# Patient Record
Sex: FEMALE | Race: BLACK OR AFRICAN AMERICAN | NOT HISPANIC OR LATINO | Employment: OTHER | URBAN - METROPOLITAN AREA
[De-identification: names, ages, dates, MRNs, and addresses within clinical notes are randomized per-mention and may not be internally consistent; named-entity substitution may affect disease eponyms.]

---

## 2017-05-26 ENCOUNTER — HOSPITAL ENCOUNTER (EMERGENCY)
Facility: HOSPITAL | Age: 52
Discharge: HOME | End: 2017-05-27
Attending: EMERGENCY MEDICINE
Payer: MEDICARE

## 2017-05-26 DIAGNOSIS — Z91.148 NON COMPLIANCE W MEDICATION REGIMEN: ICD-10-CM

## 2017-05-26 DIAGNOSIS — R10.9 ABDOMINAL PAIN, UNSPECIFIED LOCATION: ICD-10-CM

## 2017-05-26 DIAGNOSIS — E11.65 UNCONTROLLED TYPE 2 DIABETES MELLITUS WITH COMPLICATION, UNSPECIFIED LONG TERM INSULIN USE STATUS: ICD-10-CM

## 2017-05-26 DIAGNOSIS — E11.8 UNCONTROLLED TYPE 2 DIABETES MELLITUS WITH COMPLICATION, UNSPECIFIED LONG TERM INSULIN USE STATUS: ICD-10-CM

## 2017-05-26 DIAGNOSIS — K52.9 GASTROENTERITIS, ACUTE: Primary | ICD-10-CM

## 2017-05-26 DIAGNOSIS — R11.2 NON-INTRACTABLE VOMITING WITH NAUSEA, UNSPECIFIED VOMITING TYPE: ICD-10-CM

## 2017-05-26 DIAGNOSIS — R73.9 HYPERGLYCEMIA: ICD-10-CM

## 2017-05-26 DIAGNOSIS — E86.0 DEHYDRATION: ICD-10-CM

## 2017-05-26 DIAGNOSIS — R19.7 DIARRHEA, UNSPECIFIED TYPE: ICD-10-CM

## 2017-05-26 LAB
ALBUMIN SERPL BCP-MCNC: 3.4 G/DL
ALP SERPL-CCNC: 127 U/L
ALT SERPL W/O P-5'-P-CCNC: 43 U/L
ANION GAP SERPL CALC-SCNC: 11 MMOL/L
AST SERPL-CCNC: 35 U/L
B-OH-BUTYR BLD STRIP-SCNC: 0.1 MMOL/L
BACTERIA #/AREA URNS HPF: NORMAL /HPF
BASOPHILS # BLD AUTO: 0 K/UL
BASOPHILS NFR BLD: 0 %
BILIRUB SERPL-MCNC: 0.6 MG/DL
BILIRUB UR QL STRIP: NEGATIVE
BNP SERPL-MCNC: <10 PG/ML
BUN SERPL-MCNC: 11 MG/DL
CALCIUM SERPL-MCNC: 9.4 MG/DL
CHLORIDE SERPL-SCNC: 97 MMOL/L
CLARITY UR: CLEAR
CO2 SERPL-SCNC: 24 MMOL/L
COLOR UR: YELLOW
CREAT SERPL-MCNC: 0.9 MG/DL
DIFFERENTIAL METHOD: NORMAL
EOSINOPHIL # BLD AUTO: 0.3 K/UL
EOSINOPHIL NFR BLD: 3.3 %
ERYTHROCYTE [DISTWIDTH] IN BLOOD BY AUTOMATED COUNT: 12.3 %
EST. GFR  (AFRICAN AMERICAN): >60 ML/MIN/1.73 M^2
EST. GFR  (NON AFRICAN AMERICAN): >60 ML/MIN/1.73 M^2
GLUCOSE SERPL-MCNC: 445 MG/DL
GLUCOSE UR QL STRIP: ABNORMAL
HCT VFR BLD AUTO: 40.4 %
HGB BLD-MCNC: 14.1 G/DL
HGB UR QL STRIP: NEGATIVE
KETONES UR QL STRIP: NEGATIVE
LEUKOCYTE ESTERASE UR QL STRIP: NEGATIVE
LIPASE SERPL-CCNC: 60 U/L
LYMPHOCYTES # BLD AUTO: 3.4 K/UL
LYMPHOCYTES NFR BLD: 44.3 %
MCH RBC QN AUTO: 29.8 PG
MCHC RBC AUTO-ENTMCNC: 34.9 %
MCV RBC AUTO: 85 FL
MICROSCOPIC COMMENT: NORMAL
MONOCYTES # BLD AUTO: 0.5 K/UL
MONOCYTES NFR BLD: 6.4 %
NEUTROPHILS # BLD AUTO: 3.5 K/UL
NEUTROPHILS NFR BLD: 45.9 %
NITRITE UR QL STRIP: NEGATIVE
PH UR STRIP: 7 [PH] (ref 5–8)
PLATELET # BLD AUTO: 297 K/UL
PMV BLD AUTO: 10.6 FL
POCT GLUCOSE: 378 MG/DL (ref 70–110)
POCT GLUCOSE: 460 MG/DL (ref 70–110)
POTASSIUM SERPL-SCNC: 4.5 MMOL/L
PROT SERPL-MCNC: 6.7 G/DL
PROT UR QL STRIP: NEGATIVE
RBC # BLD AUTO: 4.73 M/UL
SODIUM SERPL-SCNC: 132 MMOL/L
SP GR UR STRIP: <=1.005 (ref 1–1.03)
SQUAMOUS #/AREA URNS HPF: 1 /HPF
TROPONIN I SERPL DL<=0.01 NG/ML-MCNC: <0.006 NG/ML
URN SPEC COLLECT METH UR: ABNORMAL
UROBILINOGEN UR STRIP-ACNC: ABNORMAL EU/DL
WBC # BLD AUTO: 7.66 K/UL
YEAST URNS QL MICRO: NORMAL

## 2017-05-26 PROCEDURE — 25000003 PHARM REV CODE 250: Performed by: NURSE PRACTITIONER

## 2017-05-26 PROCEDURE — 80053 COMPREHEN METABOLIC PANEL: CPT

## 2017-05-26 PROCEDURE — 81000 URINALYSIS NONAUTO W/SCOPE: CPT

## 2017-05-26 PROCEDURE — 96361 HYDRATE IV INFUSION ADD-ON: CPT

## 2017-05-26 PROCEDURE — 63600175 PHARM REV CODE 636 W HCPCS: Performed by: NURSE PRACTITIONER

## 2017-05-26 PROCEDURE — 96372 THER/PROPH/DIAG INJ SC/IM: CPT

## 2017-05-26 PROCEDURE — 99284 EMERGENCY DEPT VISIT MOD MDM: CPT | Mod: 25

## 2017-05-26 PROCEDURE — 96374 THER/PROPH/DIAG INJ IV PUSH: CPT

## 2017-05-26 PROCEDURE — 83690 ASSAY OF LIPASE: CPT

## 2017-05-26 PROCEDURE — 83880 ASSAY OF NATRIURETIC PEPTIDE: CPT

## 2017-05-26 PROCEDURE — 82010 KETONE BODYS QUAN: CPT

## 2017-05-26 PROCEDURE — 82962 GLUCOSE BLOOD TEST: CPT

## 2017-05-26 PROCEDURE — 84484 ASSAY OF TROPONIN QUANT: CPT

## 2017-05-26 PROCEDURE — 93005 ELECTROCARDIOGRAM TRACING: CPT

## 2017-05-26 PROCEDURE — 85025 COMPLETE CBC W/AUTO DIFF WBC: CPT

## 2017-05-26 RX ORDER — ONDANSETRON 2 MG/ML
4 INJECTION INTRAMUSCULAR; INTRAVENOUS
Status: COMPLETED | OUTPATIENT
Start: 2017-05-26 | End: 2017-05-26

## 2017-05-26 RX ORDER — DICYCLOMINE HYDROCHLORIDE 10 MG/ML
20 INJECTION INTRAMUSCULAR
Status: COMPLETED | OUTPATIENT
Start: 2017-05-26 | End: 2017-05-26

## 2017-05-26 RX ADMIN — DICYCLOMINE HYDROCHLORIDE 20 MG: 20 INJECTION, SOLUTION INTRAMUSCULAR at 10:05

## 2017-05-26 RX ADMIN — SODIUM CHLORIDE 1000 ML: 0.9 INJECTION, SOLUTION INTRAVENOUS at 09:05

## 2017-05-26 RX ADMIN — ONDANSETRON 4 MG: 2 INJECTION INTRAMUSCULAR; INTRAVENOUS at 10:05

## 2017-05-27 VITALS
HEART RATE: 89 BPM | BODY MASS INDEX: 27.32 KG/M2 | TEMPERATURE: 99 F | RESPIRATION RATE: 18 BRPM | HEIGHT: 66 IN | DIASTOLIC BLOOD PRESSURE: 61 MMHG | SYSTOLIC BLOOD PRESSURE: 108 MMHG | WEIGHT: 170 LBS | OXYGEN SATURATION: 95 %

## 2017-05-27 RX ORDER — LOPERAMIDE HYDROCHLORIDE 2 MG/1
2 CAPSULE ORAL 4 TIMES DAILY PRN
Qty: 20 CAPSULE | Refills: 0 | Status: SHIPPED | OUTPATIENT
Start: 2017-05-27 | End: 2017-06-06

## 2017-05-27 RX ORDER — ONDANSETRON 4 MG/1
4 TABLET, FILM COATED ORAL EVERY 6 HOURS
Qty: 12 TABLET | Refills: 0 | Status: SHIPPED | OUTPATIENT
Start: 2017-05-27 | End: 2017-08-16

## 2017-05-27 RX ORDER — DICYCLOMINE HYDROCHLORIDE 20 MG/1
20 TABLET ORAL 4 TIMES DAILY
Qty: 30 TABLET | Refills: 0 | Status: SHIPPED | OUTPATIENT
Start: 2017-05-27 | End: 2017-08-16

## 2017-05-27 NOTE — ED NOTES
Patient has verified the spelling of their name and  on armband  LOC: The patient is awake, alert, and aware of environment with an appropriate affect, the patient is oriented x 4 and speaking appropriately.   APPEARANCE: Patient resting comfortably and in no acute distress, patient is clean and well groomed, patient's clothing is properly fastened.   SKIN: The skin is warm and dry, color consistent with ethnicity, patient has normal skin turgor and moist mucus membranes, skin intact, no breakdown or bruising noted.   : Voids without difficulty  MUSCULOSKELETAL: Patient moving all extremities spontaneously, no obvious swelling or deformities noted.   RESPIRATORY: Airway is open and patent, respirations are spontaneous, patient has a normal effort and rate, no accessory muscle use noted, bilateral breath sounds clear, denies SOB   ABDOMEN: Soft  Complaints of abd pain, no distention noted, normoactive bowel sounds present in all four quadrants, complaints of nausea and vomiting.   CARDIAC: Normal rate and rhythm, no peripheral edema noted, less then 3 second capillary refill, denies chest pain

## 2017-05-27 NOTE — ED PROVIDER NOTES
Encounter Date: 5/26/2017       History     Chief Complaint   Patient presents with    Abdominal Pain     reports generlized abd pain with N/V/D for 24 hours    Nausea    Vomiting    Diarrhea     Review of patient's allergies indicates:   Allergen Reactions    Lyrica [pregabalin] Other (See Comments)     headache     51-year-old female presents to the ER for evaluation of generalized abdominal pain with nausea, vomiting, and diarrhea since yesterday.  Patient states she has had 3-4 episodes of non-bloody diarrhea today.  She has not vomited today, but she has felt nauseated all day.  She has had nothing to eat or drink today because she has felt so nauseous.  Patient states her abdominal pain pain feels like cramping, as worse prior to an episode of diarrhea.  She denies hematemesis.  No fever.  Denies urinary symptoms.  No trauma.  She denies chest pain and shortness of breath.  He has tried nothing for her symptoms.  Patient states she is supposed to be on metformin, but she has lost over 20 pounds and has not taken the medication in several months.  Patient states that she has not followed up with her doctor since her weight loss determine if she needs metformin or not.  She does not check her glucose at home.      The history is provided by the patient.   Abdominal Cramping   The primary symptoms of the illness include abdominal pain, fatigue, nausea, vomiting and diarrhea. The primary symptoms of the illness do not include fever, shortness of breath, hematemesis, hematochezia, dysuria, vaginal discharge or vaginal bleeding. The current episode started yesterday. The onset of the illness was gradual. The problem has not changed since onset.  The abdominal pain began yesterday. The pain came on gradually. The abdominal pain has been unchanged since its onset. The abdominal pain is generalized. The abdominal pain does not radiate. The severity of the abdominal pain is 7/10. The abdominal pain is relieved by  nothing. Exacerbated by: Unknown.   The fatigue began today. The fatigue has been unchanged since its onset.   Nausea began yesterday. The nausea is associated with eating. The nausea is exacerbated by food.   The vomiting began yesterday. Vomiting occurs 6 to 10 times per day. The emesis contains stomach contents.   The diarrhea began yesterday. The diarrhea is watery. The diarrhea occurs 5 - 10 times per day.   The patient states that she believes she is currently not pregnant. The patient has not had a change in bowel habit. Additional symptoms associated with the illness include anorexia. Symptoms associated with the illness do not include chills, diaphoresis, heartburn, constipation, urgency, hematuria, frequency or back pain. Significant associated medical issues do not include GERD or inflammatory bowel disease.     Past Medical History:   Diagnosis Date    Chronic back pain      Past Surgical History:   Procedure Laterality Date    BACK SURGERY      BRAIN SURGERY      carpal tunnel release      HYSTERECTOMY       History reviewed. No pertinent family history.  Social History   Substance Use Topics    Smoking status: Current Every Day Smoker     Packs/day: 0.50     Types: Cigarettes    Smokeless tobacco: Not on file    Alcohol use No     Review of Systems   Constitutional: Positive for activity change, appetite change and fatigue. Negative for chills, diaphoresis and fever.   HENT: Negative for congestion.    Respiratory: Negative for cough and shortness of breath.    Cardiovascular: Negative for chest pain.   Gastrointestinal: Positive for abdominal pain, anorexia, diarrhea, nausea and vomiting. Negative for blood in stool, constipation, heartburn, hematemesis and hematochezia.   Genitourinary: Negative for dysuria, frequency, hematuria, urgency, vaginal bleeding and vaginal discharge.   Musculoskeletal: Negative for back pain and myalgias.   Skin: Negative for rash.   Allergic/Immunologic: Negative  for immunocompromised state.   Neurological: Positive for weakness (generalized).   Hematological: Does not bruise/bleed easily.   Psychiatric/Behavioral: Negative for confusion.       Physical Exam     Initial Vitals [05/26/17 2039]   BP Pulse Resp Temp SpO2   129/79 89 20 98.7 °F (37.1 °C) 99 %     Physical Exam    Nursing note and vitals reviewed.  Constitutional: Vital signs are normal. She appears well-developed and well-nourished. She is active and cooperative.  Non-toxic appearance. She does not have a sickly appearance. She does not appear ill. No distress.   HENT:   Head: Normocephalic and atraumatic.   Mouth/Throat: Uvula is midline. Mucous membranes are not pale, dry and not cyanotic.   Eyes: Conjunctivae and EOM are normal.   Neck: Normal range of motion. Neck supple.   Cardiovascular: Normal rate, regular rhythm and normal heart sounds.   Pulmonary/Chest: Effort normal and breath sounds normal.   Abdominal: Soft. Normal appearance and bowel sounds are normal. She exhibits no distension. There is generalized tenderness (mild). There is no rigidity, no rebound, no guarding and no CVA tenderness.   Neurological: She is alert and oriented to person, place, and time. She has normal strength. GCS eye subscore is 4. GCS verbal subscore is 5. GCS motor subscore is 6.   Skin: Skin is warm, dry and intact. No bruising and no rash noted.   Psychiatric: She has a normal mood and affect. Her speech is normal and behavior is normal. Judgment and thought content normal. Cognition and memory are normal.         ED Course   Procedures  Labs Reviewed   COMPREHENSIVE METABOLIC PANEL - Abnormal; Notable for the following:        Result Value    Sodium 132 (*)     Glucose 445 (*)     Albumin 3.4 (*)     All other components within normal limits   URINALYSIS - Abnormal; Notable for the following:     Specific Gravity, UA <=1.005 (*)     Glucose, UA 3+ (*)     Urobilinogen, UA 4.0-6.0 (*)     All other components within  normal limits   POCT GLUCOSE - Abnormal; Notable for the following:     POCT Glucose 460 (*)     All other components within normal limits   POCT GLUCOSE - Abnormal; Notable for the following:     POCT Glucose 378 (*)     All other components within normal limits   CBC W/ AUTO DIFFERENTIAL   LIPASE   BETA - HYDROXYBUTYRATE, SERUM   B-TYPE NATRIURETIC PEPTIDE   TROPONIN I   B-TYPE NATRIURETIC PEPTIDE   TROPONIN I   URINALYSIS MICROSCOPIC     EKG Readings: (Independently Interpreted)   Initial Reading: No STEMI. Rhythm: Normal Sinus Rhythm. Heart Rate: 82. Ectopy: No Ectopy. Conduction: Normal. Clinical Impression: Normal Sinus Rhythm          Medical Decision Making:   Initial Assessment:   52yo female here for n/v/d and generalized abd pain since yesterday.  No PO intake today.  Pt appears well, nontoxic.  Vitals stable.  MM dry.  Abd soft, mild diffuse tenderness. No r/r/g, no distention, bowel sounds normoactive. No CVA tenderness.  HR RRR, lungs CTA and equal.    Differential Diagnosis:   Gastroenteritis, dehydration, electrolyte derangement, DKA  Clinical Tests:   Lab Tests: Reviewed and Ordered  The following lab test(s) were unremarkable: CBC and Troponin       <> Summary of Lab: Beta hydroxy normal.  Medical Tests: Ordered and Reviewed  ED Management:  Labs, IV fluids, EKG, IM bentyl, IV zofran      Bedside glucose 460.  Patient reports abdominal pain has completely resolved after IM Bentyl.  She is tolerating by mouth fluids without difficulty.  After 1 L normal saline bolus, repeat blood glucose at bedside was 378.  Advised patient to resume metformin.  I feel the patient's symptoms are due to gastroenteritis and hyperglycemia.     Patient was turned over to Dr. Almaguer at 11:00 PM.              Attending Attestation:     Physician Attestation Statement for NP/PA:   I discussed this assessment and plan of this patient with the NP/PA, but I did not personally examine the patient. The face to face encounter  was performed by the NP/PA.                  ED Course     Clinical Impression:   There were no encounter diagnoses.          CALLIE Pryor  05/28/17 3765       CALLIE Pryor  05/28/17 3148

## 2017-08-16 ENCOUNTER — HOSPITAL ENCOUNTER (EMERGENCY)
Facility: HOSPITAL | Age: 52
Discharge: HOME | End: 2017-08-16
Attending: EMERGENCY MEDICINE
Payer: MEDICARE

## 2017-08-16 VITALS
OXYGEN SATURATION: 100 % | WEIGHT: 168 LBS | HEART RATE: 70 BPM | SYSTOLIC BLOOD PRESSURE: 145 MMHG | BODY MASS INDEX: 27 KG/M2 | TEMPERATURE: 98 F | DIASTOLIC BLOOD PRESSURE: 70 MMHG | HEIGHT: 66 IN | RESPIRATION RATE: 20 BRPM

## 2017-08-16 DIAGNOSIS — E11.65 UNCONTROLLED TYPE 2 DIABETES MELLITUS WITH HYPERGLYCEMIA, WITHOUT LONG-TERM CURRENT USE OF INSULIN: Primary | ICD-10-CM

## 2017-08-16 LAB
ALBUMIN SERPL BCP-MCNC: 3.7 G/DL
ALP SERPL-CCNC: 145 U/L
ALT SERPL W/O P-5'-P-CCNC: 30 U/L
ANION GAP SERPL CALC-SCNC: 12 MMOL/L
AST SERPL-CCNC: 25 U/L
B-OH-BUTYR BLD STRIP-SCNC: 0.1 MMOL/L
BASOPHILS # BLD AUTO: 0.01 K/UL
BASOPHILS NFR BLD: 0.1 %
BILIRUB SERPL-MCNC: 0.6 MG/DL
BUN SERPL-MCNC: 12 MG/DL
CALCIUM SERPL-MCNC: 9.5 MG/DL
CHLORIDE SERPL-SCNC: 99 MMOL/L
CO2 SERPL-SCNC: 21 MMOL/L
CREAT SERPL-MCNC: 0.9 MG/DL
DIFFERENTIAL METHOD: ABNORMAL
EOSINOPHIL # BLD AUTO: 0.3 K/UL
EOSINOPHIL NFR BLD: 3.3 %
ERYTHROCYTE [DISTWIDTH] IN BLOOD BY AUTOMATED COUNT: 12.7 %
EST. GFR  (AFRICAN AMERICAN): >60 ML/MIN/1.73 M^2
EST. GFR  (NON AFRICAN AMERICAN): >60 ML/MIN/1.73 M^2
GLUCOSE SERPL-MCNC: 365 MG/DL
HCT VFR BLD AUTO: 42.5 %
HGB BLD-MCNC: 14.8 G/DL
LYMPHOCYTES # BLD AUTO: 4.1 K/UL
LYMPHOCYTES NFR BLD: 50.3 %
MCH RBC QN AUTO: 30 PG
MCHC RBC AUTO-ENTMCNC: 34.8 G/DL
MCV RBC AUTO: 86 FL
MONOCYTES # BLD AUTO: 0.5 K/UL
MONOCYTES NFR BLD: 5.5 %
NEUTROPHILS # BLD AUTO: 3.3 K/UL
NEUTROPHILS NFR BLD: 40.7 %
PLATELET # BLD AUTO: 292 K/UL
PMV BLD AUTO: 10.7 FL
POCT GLUCOSE: 327 MG/DL (ref 70–110)
POTASSIUM SERPL-SCNC: 4.6 MMOL/L
PROT SERPL-MCNC: 7.4 G/DL
RBC # BLD AUTO: 4.94 M/UL
SODIUM SERPL-SCNC: 132 MMOL/L
WBC # BLD AUTO: 8.13 K/UL

## 2017-08-16 PROCEDURE — 96361 HYDRATE IV INFUSION ADD-ON: CPT

## 2017-08-16 PROCEDURE — 82962 GLUCOSE BLOOD TEST: CPT

## 2017-08-16 PROCEDURE — 25000003 PHARM REV CODE 250: Performed by: EMERGENCY MEDICINE

## 2017-08-16 PROCEDURE — 63600175 PHARM REV CODE 636 W HCPCS: Performed by: EMERGENCY MEDICINE

## 2017-08-16 PROCEDURE — 80053 COMPREHEN METABOLIC PANEL: CPT

## 2017-08-16 PROCEDURE — 82010 KETONE BODYS QUAN: CPT

## 2017-08-16 PROCEDURE — 99283 EMERGENCY DEPT VISIT LOW MDM: CPT | Mod: 25

## 2017-08-16 PROCEDURE — 96374 THER/PROPH/DIAG INJ IV PUSH: CPT

## 2017-08-16 PROCEDURE — 85025 COMPLETE CBC W/AUTO DIFF WBC: CPT

## 2017-08-16 RX ORDER — METFORMIN HYDROCHLORIDE 750 MG/1
750 TABLET, EXTENDED RELEASE ORAL
COMMUNITY
End: 2018-04-06

## 2017-08-16 RX ORDER — LISINOPRIL 5 MG/1
5 TABLET ORAL DAILY
COMMUNITY

## 2017-08-16 RX ADMIN — SODIUM CHLORIDE 1000 ML: 0.9 INJECTION, SOLUTION INTRAVENOUS at 11:08

## 2017-08-16 RX ADMIN — INSULIN HUMAN 6 UNITS: 100 INJECTION, SOLUTION PARENTERAL at 12:08

## 2017-08-16 NOTE — ED NOTES
APPEARANCE: Alert, oriented and in no acute distress.  CARDIAC: Normal rate and rhythm, no murmur heard.   PERIPHERAL VASCULAR: peripheral pulses present. Normal cap refill. No edema. Warm to touch.    RESPIRATORY:Normal rate and effort, breath sounds clear bilaterally throughout chest. Respirations are equal and unlabored no obvious signs of distress.  GASTRO: soft, bowel sounds normal, no tenderness, no abdominal distention.  MUSC: Full ROM. No bony tenderness or soft tissue tenderness. No obvious deformity.  SKIN: Skin is warm and dry, normal skin turgor, mucous membranes moist.  NEURO: 5/5 strength major flexors/extensors bilaterally. Sensory intact to light touch bilaterally. [place] coma scale: eyes open spontaneously-4, oriented & converses-5, obeys commands-6. No neurological abnormalities.   MENTAL STATUS: awake, alert and aware of environment.  ENT: EARS: no obvious drainage. NOSE: no active bleeding.

## 2017-08-16 NOTE — ED NOTES
52 year old female presents to ed with chief complaint of hyperglycemia. Patient reports eating nutri grain bars and drinking coke taking sugar at home of 581 reports blurry vision polydipsia polyuria

## 2017-08-19 NOTE — ED PROVIDER NOTES
Encounter Date: 8/16/2017       History     Chief Complaint   Patient presents with    Hyperglycemia     CBG at home was 581, polydipsia, polyuria     HPI   Aisha Muñoz is a 52 y.o. female who has a past medical history of Chronic back pain; Diabetes mellitus; and Hypertension who presents to the Emergency Department with high blood sugar.  Symptoms began today with elevated sugar of 581 at home today.  Symptoms are associated with polyuria, polydipsia, fatigue, and are not associated with fever, chills.  Patient denies any recent illness such as URI, dysuria, diarrhea, vomiting.  She admits to drinking a lot of sodas and sugary drinks and not conforming to her usual diet.   Pt has a past surgical history that includes Back surgery; Brain surgery; Hysterectomy; and carpal tunnel release.      Review of patient's allergies indicates:   Allergen Reactions    Lyrica [pregabalin] Other (See Comments)     headache     Past Medical History:   Diagnosis Date    Chronic back pain     Diabetes mellitus     Hypertension      Past Surgical History:   Procedure Laterality Date    BACK SURGERY      BRAIN SURGERY      carpal tunnel release      HYSTERECTOMY       No family history on file.  Social History   Substance Use Topics    Smoking status: Former Smoker     Packs/day: 0.50     Types: Cigarettes     Quit date: 11/8/2013    Smokeless tobacco: Never Used    Alcohol use No     Review of Systems   Constitutional: Positive for fatigue. Negative for fever.   HENT: Negative for sore throat.    Respiratory: Negative for shortness of breath.    Cardiovascular: Negative for chest pain.   Gastrointestinal: Negative for nausea and vomiting.   Endocrine: Positive for polydipsia and polyuria.   Genitourinary: Negative for dysuria.   Musculoskeletal: Negative for back pain.   Skin: Negative for rash.   Neurological: Negative for weakness.   Hematological: Does not bruise/bleed easily.   Psychiatric/Behavioral: Negative  for sleep disturbance.       Physical Exam     Initial Vitals [08/16/17 1106]   BP Pulse Resp Temp SpO2   (!) 146/88 61 17 97.8 °F (36.6 °C) 96 %      MAP       107.33         Physical Exam    Nursing note and vitals reviewed.  Constitutional: She appears well-developed and well-nourished. She is not diaphoretic. No distress.   HENT:   Head: Normocephalic and atraumatic.   Mouth/Throat: Oropharynx is clear and moist.   Eyes: Conjunctivae are normal. Pupils are equal, round, and reactive to light.   Neck: Neck supple.   Cardiovascular: Normal rate, regular rhythm and intact distal pulses.   No murmur heard.  Pulmonary/Chest: Breath sounds normal. No respiratory distress. She has no wheezes. She has no rhonchi. She has no rales.   Abdominal: Soft. Bowel sounds are normal. She exhibits no distension. There is no tenderness. There is no guarding.   Musculoskeletal: Normal range of motion. She exhibits no edema or tenderness.   Neurological: She is alert and oriented to person, place, and time.   Skin: Skin is warm and dry. No rash noted.   Psychiatric: She has a normal mood and affect.         ED Course   Procedures  Labs Reviewed   CBC W/ AUTO DIFFERENTIAL - Abnormal; Notable for the following:        Result Value    Lymph% 50.3 (*)     All other components within normal limits   COMPREHENSIVE METABOLIC PANEL - Abnormal; Notable for the following:     Sodium 132 (*)     CO2 21 (*)     Glucose 365 (*)     Alkaline Phosphatase 145 (*)     All other components within normal limits   POCT GLUCOSE - Abnormal; Notable for the following:     POCT Glucose 327 (*)     All other components within normal limits   BETA - HYDROXYBUTYRATE, SERUM             Medical Decision Making:   Initial Assessment:   This is an emergent evaluation of a 52-year-old female presents with hyperglycemia.    Patient was evaluated in the ED for DKA, however chemistry reviewed by me did not show any evidence of Acidosis.    As well her beta  hydroxybutyrate was negative.    She was treated in the ED with IV fluids and subcutaneous insulin with subsequent improvement.    Patient remained stable during her ED evaluation.  She'll be discharged home with instructions on diabetic diet.  Diet was discussed extensively by me.  Clinical Tests:   Lab Tests: Ordered and Reviewed              Attending Attestation:             Attending ED Notes:     Clinical impression and plan discussed with patient.    Pt is to call for follow up with PCP in 7 days.  Pt to reeturn to the ED for any new or concerning symptoms.  Aftercare instructions and return precautions provided to patient.   Pt expressed understanding and agrees with plan.            ED Course   Value Comment By Time   WBC: 8.13 (Reviewed) Kemal Menendez MD 08/16 1228   Hemoglobin: 14.8 (Reviewed) Kemal Menendez MD 08/16 1228   Platelets: 292 (Reviewed) Kemal Menendez MD 08/16 1228     Clinical Impression:   The encounter diagnosis was Uncontrolled type 2 diabetes mellitus with hyperglycemia, without long-term current use of insulin.    Disposition:   Disposition: Discharged  Condition: Stable                        Kemal Menendez MD  08/19/17 0641

## 2017-10-23 ENCOUNTER — HOSPITAL ENCOUNTER (EMERGENCY)
Facility: HOSPITAL | Age: 52
Discharge: HOME | End: 2017-10-23
Attending: EMERGENCY MEDICINE
Payer: MEDICARE

## 2017-10-23 VITALS
OXYGEN SATURATION: 100 % | DIASTOLIC BLOOD PRESSURE: 82 MMHG | RESPIRATION RATE: 12 BRPM | HEART RATE: 80 BPM | SYSTOLIC BLOOD PRESSURE: 119 MMHG | TEMPERATURE: 100 F | WEIGHT: 170 LBS | HEIGHT: 66 IN | BODY MASS INDEX: 27.32 KG/M2

## 2017-10-23 DIAGNOSIS — Z87.19 HISTORY OF UMBILICAL HERNIA: ICD-10-CM

## 2017-10-23 DIAGNOSIS — R10.9 ABDOMINAL PAIN, UNSPECIFIED ABDOMINAL LOCATION: Primary | ICD-10-CM

## 2017-10-23 LAB
ALBUMIN SERPL BCP-MCNC: 3.3 G/DL
ALP SERPL-CCNC: 127 U/L
ALT SERPL W/O P-5'-P-CCNC: 21 U/L
ANION GAP SERPL CALC-SCNC: 8 MMOL/L
AST SERPL-CCNC: 21 U/L
BACTERIA #/AREA URNS HPF: ABNORMAL /HPF
BASOPHILS # BLD AUTO: 0.01 K/UL
BASOPHILS NFR BLD: 0.1 %
BILIRUB SERPL-MCNC: 0.7 MG/DL
BILIRUB UR QL STRIP: NEGATIVE
BUN SERPL-MCNC: 12 MG/DL
CALCIUM SERPL-MCNC: 9.4 MG/DL
CHLORIDE SERPL-SCNC: 99 MMOL/L
CLARITY UR: CLEAR
CO2 SERPL-SCNC: 25 MMOL/L
COLOR UR: YELLOW
CREAT SERPL-MCNC: 1.1 MG/DL
DIFFERENTIAL METHOD: NORMAL
EOSINOPHIL # BLD AUTO: 0.4 K/UL
EOSINOPHIL NFR BLD: 3.8 %
ERYTHROCYTE [DISTWIDTH] IN BLOOD BY AUTOMATED COUNT: 12.7 %
EST. GFR  (AFRICAN AMERICAN): >60 ML/MIN/1.73 M^2
EST. GFR  (NON AFRICAN AMERICAN): 58 ML/MIN/1.73 M^2
GLUCOSE SERPL-MCNC: 420 MG/DL
GLUCOSE UR QL STRIP: ABNORMAL
HCT VFR BLD AUTO: 45.5 %
HGB BLD-MCNC: 15.2 G/DL
HGB UR QL STRIP: ABNORMAL
KETONES UR QL STRIP: NEGATIVE
LACTATE SERPL-SCNC: 2.1 MMOL/L
LEUKOCYTE ESTERASE UR QL STRIP: NEGATIVE
LIPASE SERPL-CCNC: 40 U/L
LYMPHOCYTES # BLD AUTO: 3.9 K/UL
LYMPHOCYTES NFR BLD: 42 %
MCH RBC QN AUTO: 30 PG
MCHC RBC AUTO-ENTMCNC: 33.4 G/DL
MCV RBC AUTO: 90 FL
MICROSCOPIC COMMENT: ABNORMAL
MONOCYTES # BLD AUTO: 0.5 K/UL
MONOCYTES NFR BLD: 5.9 %
NEUTROPHILS # BLD AUTO: 4.4 K/UL
NEUTROPHILS NFR BLD: 48 %
NITRITE UR QL STRIP: NEGATIVE
PH UR STRIP: 6 [PH] (ref 5–8)
PLATELET # BLD AUTO: 302 K/UL
PMV BLD AUTO: 10.6 FL
POTASSIUM SERPL-SCNC: 4.6 MMOL/L
PROT SERPL-MCNC: 7.6 G/DL
PROT UR QL STRIP: NEGATIVE
RBC # BLD AUTO: 5.07 M/UL
RBC #/AREA URNS HPF: 12 /HPF (ref 0–4)
SODIUM SERPL-SCNC: 132 MMOL/L
SP GR UR STRIP: <=1.005 (ref 1–1.03)
SQUAMOUS #/AREA URNS HPF: 3 /HPF
URN SPEC COLLECT METH UR: ABNORMAL
UROBILINOGEN UR STRIP-ACNC: 1 EU/DL
WBC # BLD AUTO: 9.19 K/UL
WBC #/AREA URNS HPF: 3 /HPF (ref 0–5)
YEAST URNS QL MICRO: ABNORMAL

## 2017-10-23 PROCEDURE — 96361 HYDRATE IV INFUSION ADD-ON: CPT

## 2017-10-23 PROCEDURE — 96374 THER/PROPH/DIAG INJ IV PUSH: CPT

## 2017-10-23 PROCEDURE — 83690 ASSAY OF LIPASE: CPT

## 2017-10-23 PROCEDURE — 85025 COMPLETE CBC W/AUTO DIFF WBC: CPT

## 2017-10-23 PROCEDURE — 81000 URINALYSIS NONAUTO W/SCOPE: CPT

## 2017-10-23 PROCEDURE — 83605 ASSAY OF LACTIC ACID: CPT

## 2017-10-23 PROCEDURE — 25000003 PHARM REV CODE 250: Performed by: EMERGENCY MEDICINE

## 2017-10-23 PROCEDURE — 99284 EMERGENCY DEPT VISIT MOD MDM: CPT | Mod: 25

## 2017-10-23 PROCEDURE — 80053 COMPREHEN METABOLIC PANEL: CPT

## 2017-10-23 PROCEDURE — 63600175 PHARM REV CODE 636 W HCPCS: Performed by: EMERGENCY MEDICINE

## 2017-10-23 RX ORDER — KETOROLAC TROMETHAMINE 30 MG/ML
15 INJECTION, SOLUTION INTRAMUSCULAR; INTRAVENOUS
Status: COMPLETED | OUTPATIENT
Start: 2017-10-23 | End: 2017-10-23

## 2017-10-23 RX ADMIN — SODIUM CHLORIDE 500 ML: 0.9 INJECTION, SOLUTION INTRAVENOUS at 09:10

## 2017-10-23 RX ADMIN — KETOROLAC TROMETHAMINE 15 MG: 30 INJECTION, SOLUTION INTRAMUSCULAR at 09:10

## 2017-10-24 NOTE — ED PROVIDER NOTES
"Encounter Date: 10/23/2017    SCRIBE #1 NOTE: I, Pat Rosemary, am scribing for, and in the presence of, Dr. Reyes.       History     Chief Complaint   Patient presents with    Abdominal Pain     generalized abd pain since Wednesday. states has hx of hernia. described as "just pain". denies n/v/d. eating makes the pain worse. denies anything resolving the pain. denies taking any medication for the pain      Time seen by provider: 8:20 PM    This is a 52 y.o. female with a PMHx of chronic back pain, HTN, and DM who presents with complaint of 3 days of generalized abdominal pain. The patient reports a known umbilical hernia. She states she has recently started cooking more for her family members and has reached up to a high shelf several times, which she feels exacerbates her pain. The pain in improved with laying on her back and worse with laying on her side. She states is hurts worse after PO intake and feels improved after urinating. She denies any nausea, vomiting, diarrhea and states her pain is 9/10. Her last BM was today and normal without blood. She denies recent illness or upper respiratory symptoms, chest pain, SOB, dysuria, or abnormal bowel movements.       The history is provided by the patient.     Review of patient's allergies indicates:   Allergen Reactions    Lyrica [pregabalin] Other (See Comments)     headache     Past Medical History:   Diagnosis Date    Brain tumor (benign) 1997    Chronic back pain     Diabetes mellitus     Hypertension      Past Surgical History:   Procedure Laterality Date    BACK SURGERY      BRAIN SURGERY      carpal tunnel release      HYSTERECTOMY       History reviewed. No pertinent family history.  Social History   Substance Use Topics    Smoking status: Former Smoker     Packs/day: 0.50     Types: Cigarettes     Quit date: 11/8/2013    Smokeless tobacco: Never Used    Alcohol use No     Review of Systems   Constitutional: Negative for chills and fever. "   HENT: Negative for congestion, postnasal drip, rhinorrhea, sinus pressure and sneezing.    Eyes: Negative for visual disturbance.   Respiratory: Negative for cough and shortness of breath.    Cardiovascular: Negative for chest pain.   Gastrointestinal: Positive for abdominal pain. Negative for blood in stool, constipation, diarrhea, nausea and vomiting.   Genitourinary: Negative for dysuria.   Musculoskeletal: Negative for gait problem.   Skin: Negative for rash.   Neurological: Negative for speech difficulty.       Physical Exam     Initial Vitals [10/23/17 1912]   BP Pulse Resp Temp SpO2   (!) 140/76 90 20 99 °F (37.2 °C) 99 %      MAP       97.33         Physical Exam    Nursing note and vitals reviewed.  Constitutional: She appears well-developed and well-nourished. She is not diaphoretic. No distress.   HENT:   Head: Normocephalic and atraumatic.   Mouth/Throat: Oropharynx is clear and moist.   Eyes: EOM are normal. Pupils are equal, round, and reactive to light.   Neck: No tracheal deviation present.   Cardiovascular: Normal rate, regular rhythm, normal heart sounds and intact distal pulses.   Pulmonary/Chest: Breath sounds normal. No stridor. No respiratory distress. She has no wheezes.   Abdominal: Soft. She exhibits no distension and no mass. There is generalized tenderness (mild). There is guarding (voluntary ). There is no rebound.       No peritoneal signs. Umbilical defect consistent with hernia, no protrusion.    Musculoskeletal: Normal range of motion. She exhibits no edema.   Neurological: She is alert and oriented to person, place, and time. No cranial nerve deficit or sensory deficit.   Skin: Skin is warm and dry. Capillary refill takes less than 2 seconds. No rash noted.   Psychiatric: She has a normal mood and affect. Her behavior is normal. Thought content normal.         ED Course   Procedures  Labs Reviewed   COMPREHENSIVE METABOLIC PANEL - Abnormal; Notable for the following:        Result  Value    Sodium 132 (*)     Glucose 420 (*)     Albumin 3.3 (*)     eGFR if non  58 (*)     All other components within normal limits   URINALYSIS - Abnormal; Notable for the following:     Specific Gravity, UA <=1.005 (*)     Glucose, UA 3+ (*)     Occult Blood UA Trace (*)     All other components within normal limits   URINALYSIS MICROSCOPIC - Abnormal; Notable for the following:     RBC, UA 12 (*)     All other components within normal limits   CBC W/ AUTO DIFFERENTIAL   LIPASE   LACTIC ACID, PLASMA        Imaging Results          X-Ray Abdomen Flat And Erect (Final result)  Result time 10/23/17 21:19:19    Final result by Divya Campuzano MD (10/23/17 21:19:19)                 Impression:      1.  Nonobstructive bowel gas pattern.      Electronically signed by: DIVYA CAMPUZANO MD  Date:     10/23/17  Time:    21:19              Narrative:    Abdomen flat    Clinical history: Abdominal pain    Comparison: None    Findings:  1 upright view, 2 supine views.    No significant air-fluid levels on upright view.  Air and stool is seen within the large bowel, and projected over the rectum.  There is moderate stool throughout the colon, may reflect constipation.  No focal dilated small bowel loops.  There are no calcifications to convincingly suggest nephrolithiasis or cholelithiasis.  No large volume free air or pneumatosis.  The osseous structures are grossly intact noting postsurgical changes of the lumbosacral region.  The lung apices are grossly clear.                                 Medical Decision Making:   History:   Old Medical Records: I decided to obtain old medical records.  Initial Assessment:   52 y.o. female presents with abdominal pain x3 days. Vitals WNL. Exam benign, small umbilical defect without current hernia or significant focal tenderness. Exam not consistent with obstruction or incarcerated/strangulated hernia. Plan for abdominal xrays, labs including LFTs and lactate. Will  provide IVF, Toradol, and reassess.   Differential Diagnosis:   Differential Diagnosis includes, but is not limited to:  AAA, aortic dissection, mesenteric ischemia, perforated viscous, MI/ACS, SBO/volvulus, incarcerated/strangulated hernia, intussusception, ileus, appendicitis, cholecystitis, cholangitis, diverticulitis, esophagitis, hepatitis, nephrolithiasis, pancreatitis, gastroenteritis, colitis, IBD/IBS, biliary colic, GERD, PUD, constipation, UTI/pyelonephritis,  disorder.    Clinical Tests:   Lab Tests: Ordered and Reviewed  Radiological Study: Ordered and Reviewed  ED Management:  Labs unremarkable.  On reassessment, patient's pain completely gone after IVF and Toradol.  Discussed further workup including CT vs observation of symptoms over the next few days, and patient comfortable with D/C at this time.  Counseled on worrisome symptoms, including worsening pain, N/V, fever, unable to tolerate PO or any other concerns.    After complete evaluation, including thorough history and physical exam, the patient's symptoms are most consistent with nonspecific abdominal pain. There is no peritonitis to suggest perforation or other emergent surgical process. There are no concerning features of fever or leukocytosis to suggest acute infection. There is no significant focal tenderness to suggest cholecystitis, appendicitis, diverticulitis, or  source, and current status does not warrant other targeted diagnostics at this time. CT A/P is unlikely to outweigh risks of radiation. The patient was treated with supportive care IVF and Toradol and improved. Recommend NSAIDs for pain.     Upon re-evaluation, the patient's status has improved.    After complete ED evaluation, clinical impression is most consistent with abdominal pain.    At this time, I believe the patient is stable for discharge from the ED. The patient and any additional family present were updated with test results, overall impression, and further plan  of care. All questions answered. Patient expressed understanding and agreed with current plan for discharge and PCP follow-up within 1 week. Strict return precautions were provided, including worsening of current symptoms or any other concerns.                      ED Course      Clinical Impression:   The primary encounter diagnosis was Abdominal pain, unspecified abdominal location. A diagnosis of History of umbilical hernia was also pertinent to this visit.            I, Dr. Amauri Reyes, personally performed the services described in this documentation. All medical record entries made by the scribe were at my direction and in my presence.  I have reviewed the chart and agree that the record reflects my personal performance and is accurate and complete.     Amauri Reyes MD.  10:20 PM 10/23/2017                   Amauri Reyes MD  11/03/17 1114

## 2017-10-24 NOTE — ED TRIAGE NOTES
Pt reports generalized abdominal pain since Wednesday, denies nausea, vomiting, or diarrhea, denies fever.  Last BM today.  Reports pain is worse after eating or drinking.

## 2018-04-16 PROBLEM — G56.00 CARPAL TUNNEL SYNDROME: Status: ACTIVE | Noted: 2018-04-16

## 2019-05-25 ENCOUNTER — HOSPITAL ENCOUNTER (EMERGENCY)
Facility: HOSPITAL | Age: 54
Discharge: HOME | End: 2019-05-25
Attending: EMERGENCY MEDICINE
Payer: COMMERCIAL

## 2019-05-25 VITALS
DIASTOLIC BLOOD PRESSURE: 75 MMHG | BODY MASS INDEX: 32.28 KG/M2 | WEIGHT: 200 LBS | SYSTOLIC BLOOD PRESSURE: 163 MMHG | OXYGEN SATURATION: 99 % | RESPIRATION RATE: 20 BRPM | TEMPERATURE: 98 F | HEART RATE: 72 BPM

## 2019-05-25 DIAGNOSIS — M79.671 RIGHT FOOT PAIN: Primary | ICD-10-CM

## 2019-05-25 PROCEDURE — 90471 IMMUNIZATION ADMIN: CPT | Performed by: NURSE PRACTITIONER

## 2019-05-25 PROCEDURE — 99283 EMERGENCY DEPT VISIT LOW MDM: CPT | Mod: 25

## 2019-05-25 PROCEDURE — 90715 TDAP VACCINE 7 YRS/> IM: CPT | Performed by: NURSE PRACTITIONER

## 2019-05-25 PROCEDURE — 63600175 PHARM REV CODE 636 W HCPCS: Performed by: NURSE PRACTITIONER

## 2019-05-25 RX ADMIN — CLOSTRIDIUM TETANI TOXOID ANTIGEN (FORMALDEHYDE INACTIVATED), CORYNEBACTERIUM DIPHTHERIAE TOXOID ANTIGEN (FORMALDEHYDE INACTIVATED), BORDETELLA PERTUSSIS TOXOID ANTIGEN (GLUTARALDEHYDE INACTIVATED), BORDETELLA PERTUSSIS FILAMENTOUS HEMAGGLUTININ ANTIGEN (FORMALDEHYDE INACTIVATED), BORDETELLA PERTUSSIS PERTACTIN ANTIGEN, AND BORDETELLA PERTUSSIS FIMBRIAE 2/3 ANTIGEN 0.5 ML: 5; 2; 2.5; 5; 3; 5 INJECTION, SUSPENSION INTRAMUSCULAR at 03:05

## 2019-05-25 NOTE — DISCHARGE INSTRUCTIONS
FOLLOW-UP WITH YOUR FOOT DOCTOR IN 2-3 DAYS. RETURN TO ED FOR ANY CONCERNS OR WORSENING SYMPTOMS.

## 2019-05-25 NOTE — ED PROVIDER NOTES
"Encounter Date: 5/25/2019       History     Chief Complaint   Patient presents with    Foot Pain     53 year old female presents to ed cc of right foot pain patient states stepped on nail wiht right foot in march and has been having pain to foot since. patient unsure of last tetanus shot      Patient is a 53-year-old female with pmhx of arthritis, chronic back pain, diabetes, and HTN who presents to ED for evaluation of intermittent right foot pain since March. Patient states that on 3/20/19 she stepped on a nail that went through her shoe. Patient states that she did pull the nail out but has been having intermittent foot pain since. On 3/22/19 patient saw a podiatrist, who took an Xray, which was benign and told her that the she "may need an MRI." Patient denies any warmth, redness, or drainage from foot and is able to bear weight on it. Patient states that she has a follow-up appt with podiatrist on 6/5/19. Denies any alleviating or exacerbating factors. Denies fever, chills, numbness, tingling, or any other concerns.     The history is provided by the patient.     Review of patient's allergies indicates:   Allergen Reactions    Lyrica [pregabalin] Other (See Comments)     headache     Past Medical History:   Diagnosis Date    Arthritis     Brain tumor (benign) 1997    Chronic back pain     Diabetes mellitus     Hypertension      Past Surgical History:   Procedure Laterality Date    BACK SURGERY      no hardware  fusion    BRAIN SURGERY  1997    benign tumor removed from brain    carpal tunnel release      rosa m hands    HYSTERECTOMY      RELEASE-CARPAL TUNNEL Left 4/16/2018    Performed by Ramon Schroeder [facility] at [facility]    SYNOVECTOMY-WRIST Left 4/16/2018    Performed by Ramon Schroeder MD at [facility] [place]     History reviewed. No pertinent family history.  Social History     Tobacco Use    Smoking status: Former Smoker     Packs/day: 0.50     Types: Cigarettes     Last attempt to quit: 11/8/2013     " Years since quittin.5    Smokeless tobacco: Never Used   Substance Use Topics    Alcohol use: No    Drug use: No     Review of Systems   Constitutional: Negative for chills and fever.   Musculoskeletal: Positive for arthralgias (right foot pain). Negative for gait problem, neck pain and neck stiffness.   Neurological: Negative for weakness.   Hematological: Does not bruise/bleed easily.   All other systems reviewed and are negative.      Physical Exam     Initial Vitals [19 1442]   BP Pulse Resp Temp SpO2   (!) 163/75 72 20 97.8 °F (36.6 °C) 99 %      MAP       --         Physical Exam    Vitals reviewed.  Constitutional: She appears well-developed and well-nourished.  Non-toxic appearance. She does not have a sickly appearance.   HENT:   Head: Atraumatic.   Mouth/Throat: Oropharynx is clear and moist.   Eyes: EOM are normal.   Neck: Normal range of motion, full passive range of motion without pain and phonation normal. Neck supple.   Cardiovascular: Regular rhythm.   Pulses:       Dorsalis pedis pulses are 2+ on the right side, and 2+ on the left side.   Asymptomatic hypertension.     Pulmonary/Chest: No respiratory distress.   Musculoskeletal:        Right foot: There is tenderness. There is normal range of motion, no bony tenderness, no swelling, normal capillary refill, no crepitus, no deformity and no laceration.        Feet:    TTP to marked area with well-healed puncture wound.  No warmth, redness, or drainage.  Sensation and strength intact in BLE.  Radial pulses equal bilaterally.     Neurological: She is alert and oriented to person, place, and time. She has normal strength. No sensory deficit. Gait normal.   Steady gait.   Skin: Skin is warm. No rash noted.   Psychiatric: She has a normal mood and affect.         ED Course   Procedures  Labs Reviewed - No data to display       Imaging Results          X-Ray Foot Complete Right (Final result)  Result time 19 15:25:30    Final result by  Shahriar Miranda MD (05/25/19 15:25:30)                 Impression:      No acute displaced fracture-dislocation identified.      Electronically signed by: Shahriar Miranda MD  Date:    05/25/2019  Time:    15:25             Narrative:    EXAMINATION:  XR FOOT COMPLETE 3 VIEW RIGHT    CLINICAL HISTORY:  . Pain in right foot    TECHNIQUE:  AP, lateral, and oblique views of the right foot were performed.    COMPARISON:  None    FINDINGS:  Bones are well mineralized. Overall alignment is within normal limits. No displaced fracture, dislocation or destructive osseous process.  Minimal degenerative change at the 1st MTP joint.  No subcutaneous emphysema or radiodense retained foreign body.                                 Medical Decision Making:   History:   Old Medical Records: I decided to obtain old medical records.  Initial Assessment:    Patient is a 53-year-old female with pmhx of arthritis, chronic back pain, diabetes, and HTN who presents to ED for evaluation of intermittent right foot pain since March. Appears well, non-toxic. Afebrile. VSS. Hypertensive in ED. Denies symptoms of hypertension including: any vision changes, dizziness, headache, SOB, and chest pain.          ED Management:  Ace wrap, tdap   Xray shows no acute abnormalities. No signs of infection or cellulitis. Tdap updated in ED. Asymptomatic hypertension. Patient has appt with podiatrist on 6/5/19. Patient is HDS and will be d/c home with ace wrap. Patient instructed to keep appt with podiatrist and to return to ED for any concerns or worsening symptoms. Patient verbalized d/c instructions and is in compliance and agreement with tx plan.                     ED Course as of May 25 1837   Sat May 25, 2019   1815 Seen by midlevel only    [ST]      ED Course User Index  [ST] Zaria Wall MD     Clinical Impression:       ICD-10-CM ICD-9-CM   1. Right foot pain M79.671 729.5                                Michael Rojo, YOUSUF  05/25/19 1628        Michael Rojo, NP  05/25/19 1834

## 2019-05-25 NOTE — ED TRIAGE NOTES
Patient presents to ER w/ c/o right foot pain; patient states 3/20/19 she stepped on a nail causing right foot pain; pt saw podiatrist 3/22/19 and was told an MRI was warranted; pt ambulatory to ED room; Vitals stable, no distress noted

## 2020-08-13 ENCOUNTER — TELEPHONE (OUTPATIENT)
Dept: PAIN MEDICINE | Facility: CLINIC | Age: 55
End: 2020-08-13

## 2020-08-13 DIAGNOSIS — M47.817 LUMBOSACRAL SPONDYLOSIS WITHOUT MYELOPATHY: Primary | ICD-10-CM

## 2021-01-11 ENCOUNTER — CLINICAL SUPPORT (OUTPATIENT)
Dept: URGENT CARE | Facility: CLINIC | Age: 56
End: 2021-01-11
Payer: MEDICARE

## 2021-01-11 DIAGNOSIS — U07.1 COVID-19 VIRUS DETECTED: ICD-10-CM

## 2021-01-11 DIAGNOSIS — Z11.59 ENCOUNTER FOR SCREENING FOR OTHER VIRAL DISEASES: Primary | ICD-10-CM

## 2021-01-11 LAB
CTP QC/QA: YES
SARS-COV-2 RDRP RESP QL NAA+PROBE: POSITIVE

## 2021-01-11 PROCEDURE — U0002: ICD-10-PCS | Mod: QW,S$GLB,, | Performed by: PHYSICIAN ASSISTANT

## 2021-01-11 PROCEDURE — U0002 COVID-19 LAB TEST NON-CDC: HCPCS | Mod: QW,S$GLB,, | Performed by: PHYSICIAN ASSISTANT

## 2021-01-12 ENCOUNTER — NURSE TRIAGE (OUTPATIENT)
Dept: ADMINISTRATIVE | Facility: CLINIC | Age: 56
End: 2021-01-12

## 2021-01-13 ENCOUNTER — NURSE TRIAGE (OUTPATIENT)
Dept: ADMINISTRATIVE | Facility: CLINIC | Age: 56
End: 2021-01-13

## 2021-07-22 ENCOUNTER — CLINICAL SUPPORT (OUTPATIENT)
Dept: URGENT CARE | Facility: CLINIC | Age: 56
End: 2021-07-22
Payer: MEDICARE

## 2021-07-22 DIAGNOSIS — Z20.828 EXPOSURE TO VIRAL DISEASE: Primary | ICD-10-CM

## 2021-07-22 LAB
CTP QC/QA: YES
SARS-COV-2 RDRP RESP QL NAA+PROBE: NEGATIVE

## 2021-07-22 PROCEDURE — U0002: ICD-10-PCS | Mod: QW,S$GLB,, | Performed by: FAMILY MEDICINE

## 2021-07-22 PROCEDURE — 99211 OFF/OP EST MAY X REQ PHY/QHP: CPT | Mod: S$GLB,CS,, | Performed by: FAMILY MEDICINE

## 2021-07-22 PROCEDURE — 99211 PR OFFICE/OUTPT VISIT, EST, LEVL I: ICD-10-PCS | Mod: S$GLB,CS,, | Performed by: FAMILY MEDICINE

## 2021-07-22 PROCEDURE — U0002 COVID-19 LAB TEST NON-CDC: HCPCS | Mod: QW,S$GLB,, | Performed by: FAMILY MEDICINE

## 2022-09-29 ENCOUNTER — HOSPITAL ENCOUNTER (EMERGENCY)
Facility: HOSPITAL | Age: 57
Discharge: HOME | End: 2022-09-29
Attending: STUDENT IN AN ORGANIZED HEALTH CARE EDUCATION/TRAINING PROGRAM
Payer: MEDICARE

## 2022-09-29 VITALS
RESPIRATION RATE: 18 BRPM | HEART RATE: 68 BPM | WEIGHT: 199 LBS | TEMPERATURE: 99 F | BODY MASS INDEX: 31.98 KG/M2 | HEIGHT: 66 IN | OXYGEN SATURATION: 97 % | DIASTOLIC BLOOD PRESSURE: 79 MMHG | SYSTOLIC BLOOD PRESSURE: 159 MMHG

## 2022-09-29 DIAGNOSIS — M25.579 ANKLE PAIN: ICD-10-CM

## 2022-09-29 DIAGNOSIS — M25.572 ACUTE LEFT ANKLE PAIN: Primary | ICD-10-CM

## 2022-09-29 PROCEDURE — 99284 EMERGENCY DEPT VISIT MOD MDM: CPT | Mod: 25

## 2022-09-29 PROCEDURE — 63600175 PHARM REV CODE 636 W HCPCS: Performed by: PHYSICIAN ASSISTANT

## 2022-09-29 PROCEDURE — 96372 THER/PROPH/DIAG INJ SC/IM: CPT | Performed by: PHYSICIAN ASSISTANT

## 2022-09-29 RX ORDER — KETOROLAC TROMETHAMINE 30 MG/ML
15 INJECTION, SOLUTION INTRAMUSCULAR; INTRAVENOUS
Status: COMPLETED | OUTPATIENT
Start: 2022-09-29 | End: 2022-09-29

## 2022-09-29 RX ORDER — NAPROXEN 500 MG/1
500 TABLET ORAL 2 TIMES DAILY WITH MEALS
Qty: 20 TABLET | Refills: 0 | Status: SHIPPED | OUTPATIENT
Start: 2022-09-29 | End: 2023-06-07

## 2022-09-29 RX ADMIN — KETOROLAC TROMETHAMINE 15 MG: 30 INJECTION, SOLUTION INTRAMUSCULAR; INTRAVENOUS at 12:09

## 2022-09-29 NOTE — DISCHARGE INSTRUCTIONS
Use an ice pack to the region.  Use Ace bandage for comfort and to reduce swelling.  Take medication as prescribed.

## 2022-09-29 NOTE — ED NOTES
Review of patient's allergies indicates:   Allergen Reactions    Lyrica [pregabalin] Other (See Comments)     headache       Patient has verified the spelling of their name and  on armband.   APPEARANCE: Patient is alert, calm, oriented x 4, and does not appear distressed.  CARDIAC: Normal rate and rhythm, no murmur heard.   RESPIRATORY:Normal rate and effort. Breath sounds clear bilaterally throughout chest. Respirations are equal and unlabored.    MUSCLE: Full ROM. No bony tenderness or soft tissue tenderness. No obvious deformity. +Pt c/o of pain to LLE ankle that began Saturday, denies trauma, tripping, or falling. Pain worsens when asked to dorsiflex; Mild pain with weight bearing; site is reddened, no obvious deformity, no swelling or bruising noted. Pulses +2, no tingling or numbness, pt can wiggle toes.   PERIPHERAL VASCULAR: peripheral pulses present. Normal cap refill. No edema. Warm to touch.  NEURO: 5/5 strength major flexors/extensors bilaterally. Sensory intact to light touch bilaterally. [place] coma scale: eyes open spontaneously-4, oriented & converses-5, obeys commands-6. No neurological abnormalities.

## 2022-09-29 NOTE — Clinical Note
"Aisha Ricejulius Muñoz was seen and treated in our emergency department on 9/29/2022.  She may return to work on 10/02/2022.       If you have any questions or concerns, please don't hesitate to call.      Mirian Fuller PA-C"

## 2022-09-29 NOTE — ED PROVIDER NOTES
Encounter Date: 2022       History     Chief Complaint   Patient presents with    Ankle Pain     C/O left ankle pain x 5 days, denies trauma, no obvious deformity, no swelling or redness, ambulatory into triage     57-year-old female presents to patient of ankle pain.  Ongoing for the last 5 days.  Patient reports that she has swelling to the ankle as well.  Patient reports prolonged standing over the weekend while cooking.  [place] for a prolonged period of time.  Denies any paresthesia focal weakness.  No injury or trauma otherwise.  Has not had any redness .  No fevers or chills.    The history is provided by the patient.   Review of patient's allergies indicates:   Allergen Reactions    Lyrica [pregabalin] Other (See Comments)     headache     Past Medical History:   Diagnosis Date    Arthritis     Brain tumor (benign)     Chronic back pain     Diabetes mellitus     Hypertension      Past Surgical History:   Procedure Laterality Date    BACK SURGERY      no hardware  fusion    BRAIN SURGERY      benign tumor removed from brain    carpal tunnel release      rosa m hands    HYSTERECTOMY       History reviewed. No pertinent family history.  Social History     Tobacco Use    Smoking status: Former     Packs/day: 0.50     Types: Cigarettes     Quit date: 2013     Years since quittin.8    Smokeless tobacco: Never   Substance Use Topics    Alcohol use: No    Drug use: No     Review of Systems   Constitutional:  Negative for chills and fever.   Eyes:  Negative for visual disturbance.   Respiratory:  Negative for shortness of breath.    Cardiovascular:  Negative for chest pain.   Gastrointestinal:  Negative for nausea and vomiting.   Genitourinary:  Negative for dysuria and flank pain.   Musculoskeletal:  Positive for arthralgias and joint swelling. Negative for myalgias.   Skin:  Negative for rash.   Allergic/Immunologic: Negative for immunocompromised  state.   Neurological:  Negative for weakness and numbness.   Hematological:  Does not bruise/bleed easily.   Psychiatric/Behavioral:  Negative for confusion.      Physical Exam     Initial Vitals [09/29/22 1151]   BP Pulse Resp Temp SpO2   (!) 159/79 68 18 98.9 °F (37.2 °C) 97 %      MAP       --         Physical Exam    Vitals reviewed.  Constitutional: She appears well-developed and well-nourished. She is not diaphoretic. No distress.   HENT:   Head: Normocephalic and atraumatic.   Eyes: Conjunctivae and EOM are normal.   Neck: Neck supple.   Pulmonary/Chest: No respiratory distress.   Musculoskeletal:         General: Normal range of motion.      Cervical back: Neck supple.      Right ankle: Normal.      Left ankle: Swelling present. Tenderness present over the lateral malleolus. Normal pulse.     Neurological: She is alert and oriented to person, place, and time.   Skin: Skin is warm.       ED Course   Procedures 57-year-old  Labs Reviewed - No data to display       Imaging Results              X-Ray Ankle Complete Left (Final result)  Result time 09/29/22 13:05:35      Final result by Navi Campuzano MD (09/29/22 13:05:35)                   Impression:      1. No acute displaced fracture or dislocation of the ankle.      Electronically signed by: Navi Campuzano MD  Date:    09/29/2022  Time:    13:05               Narrative:    EXAMINATION:  XR ANKLE COMPLETE 3 VIEW LEFT    CLINICAL HISTORY:  Pain in unspecified ankle and joints of unspecified foot    TECHNIQUE:  AP, lateral and oblique views of the left ankle were performed.    COMPARISON:  None    FINDINGS:  Three views left ankle.    No acute displaced fracture or dislocation of the ankle.  No radiopaque foreign body.  No significant edema.  The ankle mortise is intact.                                       Medications   ketorolac injection 15 mg (15 mg Intramuscular Given 9/29/22 1211)           APC / Resident Notes:   Patient seen in the ER promptly  upon arrival.  She is afebrile, no acute distress.  Physical examination reveals tenderness on palpation to the lateral malleolus.  Distal pulses intact equal bilaterally.  Range of motion of the ankle intact.  There is no erythema noted.  Mild swelling to the lateral malleolus noted.  She is ambulatory with a steady gait.    X-ray of the ankle obtained.  Toradol provided.    X-ray of the ankle was found to be unremarkable.  No acute displaced fracture or dislocation.  Patient was provided with an Ace bandage.  Will prescribed home on naproxen.  Will advised ice elevate and rest.  Given strict return precautions ED. Stable for discharge at this time.    Disclaimer: This note has been generated using voice-recognition software. There may be typographical errors that have been missed during proof-reading.                       Clinical Impression:   Final diagnoses:  [M25.579] Ankle pain  [M25.572] Acute left ankle pain (Primary)        ED Disposition Condition    Discharge Stable          ED Prescriptions       Medication Sig Dispense Start Date End Date Auth. Provider    naproxen (NAPROSYN) 500 MG tablet Take 1 tablet (500 mg total) by mouth 2 (two) times daily with meals. 20 tablet 9/29/2022 -- Mirian Fuller PA-C          Follow-up Information       Follow up With Specialties Details Why Contact Info Additional Information    [facility] Schedule an appointment as soon as possible for a visit   [address], [address]  [place] [address] [phone] Please park in [address] or D and use Gi[address] forte. Take Medical Office Bldg. elevators.             Mirian Fuller PA-C  09/29/22 7295

## 2023-05-31 ENCOUNTER — TELEPHONE (OUTPATIENT)
Dept: NEUROSURGERY | Facility: CLINIC | Age: 58
End: 2023-05-31
Payer: MEDICARE

## 2023-05-31 NOTE — TELEPHONE ENCOUNTER
Spoke to patient. She stated that she will get imaging on disc to bring to appt even though it was over a year old

## 2023-06-01 ENCOUNTER — OFFICE VISIT (OUTPATIENT)
Dept: NEUROSURGERY | Facility: CLINIC | Age: 58
End: 2023-06-01
Payer: MEDICARE

## 2023-06-01 ENCOUNTER — TELEPHONE (OUTPATIENT)
Dept: NEUROSURGERY | Facility: CLINIC | Age: 58
End: 2023-06-01
Payer: MEDICARE

## 2023-06-01 ENCOUNTER — HOSPITAL ENCOUNTER (OUTPATIENT)
Dept: RADIOLOGY | Facility: HOSPITAL | Age: 58
Discharge: HOME | End: 2023-06-01
Attending: PHYSICIAN ASSISTANT
Payer: MEDICARE

## 2023-06-01 VITALS — TEMPERATURE: 97 F | HEART RATE: 76 BPM | DIASTOLIC BLOOD PRESSURE: 76 MMHG | SYSTOLIC BLOOD PRESSURE: 116 MMHG

## 2023-06-01 DIAGNOSIS — M54.50 LOW BACK PAIN, UNSPECIFIED BACK PAIN LATERALITY, UNSPECIFIED CHRONICITY, UNSPECIFIED WHETHER SCIATICA PRESENT: Primary | ICD-10-CM

## 2023-06-01 DIAGNOSIS — I10 PRIMARY HYPERTENSION: Chronic | ICD-10-CM

## 2023-06-01 DIAGNOSIS — M54.41 ACUTE MIDLINE LOW BACK PAIN WITH RIGHT-SIDED SCIATICA: Primary | ICD-10-CM

## 2023-06-01 DIAGNOSIS — M54.50 LOW BACK PAIN, UNSPECIFIED BACK PAIN LATERALITY, UNSPECIFIED CHRONICITY, UNSPECIFIED WHETHER SCIATICA PRESENT: ICD-10-CM

## 2023-06-01 PROCEDURE — 3074F PR MOST RECENT SYSTOLIC BLOOD PRESSURE < 130 MM HG: ICD-10-PCS | Mod: CPTII,S$GLB,, | Performed by: PHYSICIAN ASSISTANT

## 2023-06-01 PROCEDURE — 4010F PR ACE/ARB THEARPY RXD/TAKEN: ICD-10-PCS | Mod: CPTII,S$GLB,, | Performed by: PHYSICIAN ASSISTANT

## 2023-06-01 PROCEDURE — 72110 X-RAY EXAM L-2 SPINE 4/>VWS: CPT | Mod: TC

## 2023-06-01 PROCEDURE — 4010F ACE/ARB THERAPY RXD/TAKEN: CPT | Mod: CPTII,S$GLB,, | Performed by: PHYSICIAN ASSISTANT

## 2023-06-01 PROCEDURE — 1159F MED LIST DOCD IN RCRD: CPT | Mod: CPTII,S$GLB,, | Performed by: PHYSICIAN ASSISTANT

## 2023-06-01 PROCEDURE — 99999 PR PBB SHADOW E&M-EST. PATIENT-LVL III: ICD-10-PCS | Mod: PBBFAC,,, | Performed by: PHYSICIAN ASSISTANT

## 2023-06-01 PROCEDURE — 99204 PR OFFICE/OUTPT VISIT, NEW, LEVL IV, 45-59 MIN: ICD-10-PCS | Mod: S$GLB,,, | Performed by: PHYSICIAN ASSISTANT

## 2023-06-01 PROCEDURE — 3078F DIAST BP <80 MM HG: CPT | Mod: CPTII,S$GLB,, | Performed by: PHYSICIAN ASSISTANT

## 2023-06-01 PROCEDURE — 99204 OFFICE O/P NEW MOD 45 MIN: CPT | Mod: S$GLB,,, | Performed by: PHYSICIAN ASSISTANT

## 2023-06-01 PROCEDURE — 72110 X-RAY EXAM L-2 SPINE 4/>VWS: CPT | Mod: 26,,, | Performed by: RADIOLOGY

## 2023-06-01 PROCEDURE — 1159F PR MEDICATION LIST DOCUMENTED IN MEDICAL RECORD: ICD-10-PCS | Mod: CPTII,S$GLB,, | Performed by: PHYSICIAN ASSISTANT

## 2023-06-01 PROCEDURE — 3078F PR MOST RECENT DIASTOLIC BLOOD PRESSURE < 80 MM HG: ICD-10-PCS | Mod: CPTII,S$GLB,, | Performed by: PHYSICIAN ASSISTANT

## 2023-06-01 PROCEDURE — 72110 XR LUMBAR SPINE AP AND LAT WITH FLEX/EXT: ICD-10-PCS | Mod: 26,,, | Performed by: RADIOLOGY

## 2023-06-01 PROCEDURE — 3074F SYST BP LT 130 MM HG: CPT | Mod: CPTII,S$GLB,, | Performed by: PHYSICIAN ASSISTANT

## 2023-06-01 PROCEDURE — 99999 PR PBB SHADOW E&M-EST. PATIENT-LVL III: CPT | Mod: PBBFAC,,, | Performed by: PHYSICIAN ASSISTANT

## 2023-06-01 RX ORDER — ACETAMINOPHEN 325 MG/1
325 TABLET ORAL
COMMUNITY

## 2023-06-01 RX ORDER — OXYCODONE AND ACETAMINOPHEN 10; 325 MG/1; MG/1
1 TABLET ORAL
COMMUNITY
Start: 2023-05-21 | End: 2023-08-25

## 2023-06-01 RX ORDER — TIZANIDINE 4 MG/1
4 TABLET ORAL DAILY PRN
COMMUNITY
Start: 2023-03-24 | End: 2023-08-25

## 2023-06-01 RX ORDER — IBUPROFEN 800 MG/1
800 TABLET ORAL EVERY 6 HOURS PRN
COMMUNITY
Start: 2022-12-14 | End: 2023-08-25

## 2023-06-01 RX ORDER — FLUCONAZOLE 150 MG
TABLET ORAL
COMMUNITY
Start: 2023-02-26 | End: 2023-08-25

## 2023-06-01 RX ORDER — LIDOCAINE 50 MG/G
PATCH TOPICAL
COMMUNITY
Start: 2023-05-08

## 2023-06-01 RX ORDER — GABAPENTIN 300 MG/1
300 CAPSULE ORAL
COMMUNITY
Start: 2023-05-21 | End: 2023-08-25

## 2023-06-01 RX ORDER — EPINEPHRINE 0.3 MG/.3ML
INJECTION SUBCUTANEOUS
COMMUNITY
Start: 2023-01-04

## 2023-06-01 NOTE — PROGRESS NOTES
[facility] 8th Fl  Neurosurgery    SUBJECTIVE:     History of Present Illness:  Aisha Muñoz is a 57 y.o. female with hx of arthritis, DM, HTN, chronic back pain who presents for evaluation of back and leg pain. Surgical hx pertinent for L5-S1 ALIF in  at an outside hospital. Reports back and leg pain intermittently for the past year when she started working. She walks 9 hours/day, 6 days/week. Walking, standing, sitting aggravates it. Lying down alleviates her pain. She notes when she has time off from work, her pain is better. She has tried shoes with better support without much pain relief. She states the pain is all throughout her leg. Reports associated spasms, numbness, tingling. Denies b/b incontinence, saddle anesthesia, urinary retention. Back pain is worse than leg pain. She has tried lidocaine patches, gabapentin, muscle relaxants, pain medication without relief. She follows with pain management and has recently tried an ablation with minimal relief.         Review of patient's allergies indicates:   Allergen Reactions    Fish containing products Shortness Of Breath, Swelling and Rash    Grape Itching    Strawberries [strawberry] Itching    Lyrica [pregabalin] Other (See Comments)     headache       Past Medical History:   Diagnosis Date    Arthritis     Brain tumor (benign)     Chronic back pain     Diabetes mellitus     Hypertension        Past Surgical History:   Procedure Laterality Date    BACK SURGERY      no hardware  fusion    BRAIN SURGERY      benign tumor removed from brain    carpal tunnel release      rosa m hands    HYSTERECTOMY          No family history on file.    Social History     Socioeconomic History    Marital status:    Tobacco Use    Smoking status: Former     Packs/day: 0.50     Types: Cigarettes     Quit date: 2013     Years since quittin.5    Smokeless tobacco: Never   Substance and Sexual Activity    Alcohol use: No    Drug use: No     Sexual activity: Yes     Partners: Male       Review of Systems:  Constitutional: no fever or chills  Eyes: no visual changes   ENT: no nasal congestion or sore throat   Respiratory: no cough or shortness of breath   Cardiovascular: no chest pain or palpitations   Gastrointestinal: no nausea or vomiting   Genitourinary: no hematuria or dysuria   Integument/Breast: no rash or pruritis   Musculoskeletal: no arthralgias or myalgias.       OBJECTIVE:     Vital Signs (Most Recent):  Vitals:    06/01/23 1032   BP: 116/76   Pulse: 76   Temp: 97.1 °F (36.2 °C)       Physical Exam:  General: well developed, well nourished, no distress.   Head: normocephalic, atraumatic  Neurologic: Alert and oriented. Thought content appropriate.  GCS: Motor: 6/Verbal: 5/Eyes: 4 GCS Total: 15  Mental Status: Awake, Alert, Oriented x 4  Language: No aphasia  Speech: No dysarthria  Cranial nerves: face symmetric, tongue midline, CN II-XII grossly intact, EOMI.  Pulmonary: normal respirations, no signs of respiratory distress  Abdomen: soft, non-distended, not tender to palpation  Sensory: intact to light touch throughout  Motor Strength: Moves all extremities spontaneously with good tone.  Full strength upper and lower extremities. No abnormal movements seen.     Strength  Deltoids Triceps Biceps Wrist Extension Wrist Flexion Hand    Upper: R 5/5 5/5 5/5 5/5 5/5 5/5    L 5/5 5/5 5/5 5/5 5/5 5/5     Iliopsoas Quadriceps Knee  Flexion Tibialis  anterior Gastro- cnemius EHL   Lower: R 5/5 5/5 5/5 5/5 5/5 5/5    L 5/5 5/5 5/5 5/5 5/5 5/5     DTR's: 2 + and symmetric in UE and LE  Starks: absent  Clonus: absent  Skin: Skin is warm, dry and intact.  Gait: normal  Tandem Gait: No difficulty         Able to walk on heels & toes  Cervical ROM: full  Lumbar ROM: full   SI Joint tenderness: Negative     No midline tenderness to palpation. No surrounding paraspinal muscle tenderness.    Diagnostic Results:  I have personally reviewed all pertinent  imaging.    XR lumbar spine flex/ext 6/1/2023:  - no dynamic instability, prior L5-S1 ALIF with hardware intact    MRI lumbar spine 3/14/2022:  - no significant canal or foraminal stenosis, no significant disc height loss   - facet hypertrophy L3-4, L4-5      ASSESSMENT/PLAN:     Aisha Muñoz is a 57 y.o. female with hx L5-S1 ALIF in 2009 who presents with back and leg pain. Imaging shows facet arthropathy but no significant canal stenosis, foraminal stenosis, or instability. She has pain limited weakness on exam otherwise is neuro intact. Will refer to aquatherapy and obtain an EMG of the right leg to further evaluate her leg pain. We discussed concerning signs and symptoms that would prompt return to clinic or urgent medical attention, patient v/u. All questions answered. Encouraged to call the clinic with questions/concerns prior to the next visit.        Cait Fritz PA-C  Neurosurgery      Note dictated with voice recognition software, please excuse any grammatical errors.

## 2023-06-01 NOTE — TELEPHONE ENCOUNTER
Spoke to patient and and confirmed time and location for Xrays prior to appointment with Cait today

## 2023-06-07 PROBLEM — M54.41 ACUTE MIDLINE LOW BACK PAIN WITH RIGHT-SIDED SCIATICA: Chronic | Status: ACTIVE | Noted: 2023-06-07

## 2023-06-07 PROBLEM — E11.9 TYPE 2 DIABETES MELLITUS, WITHOUT LONG-TERM CURRENT USE OF INSULIN: Chronic | Status: ACTIVE | Noted: 2023-06-07

## 2023-06-07 PROBLEM — I10 PRIMARY HYPERTENSION: Chronic | Status: ACTIVE | Noted: 2023-06-07

## 2023-06-12 ENCOUNTER — PATIENT MESSAGE (OUTPATIENT)
Dept: NEUROSURGERY | Facility: CLINIC | Age: 58
End: 2023-06-12
Payer: MEDICARE

## 2023-06-13 ENCOUNTER — DOCUMENTATION ONLY (OUTPATIENT)
Dept: REHABILITATION | Facility: HOSPITAL | Age: 58
End: 2023-06-13
Payer: MEDICARE

## 2023-06-13 NOTE — PROGRESS NOTES
Occupational Therapy Missed Treatment      Patient Name:  Aisha Muñoz   MRN:  [id number]    Pt marked as no-show for today's aquatic OT eval.   Aquatic therapist attempted to call to reschedule, no answer.  Pt will return to waitlist for aquatic therapy services at this time.     Padmaja Leonard, OT  6/13/2023

## 2023-06-27 ENCOUNTER — CLINICAL SUPPORT (OUTPATIENT)
Dept: REHABILITATION | Facility: HOSPITAL | Age: 58
End: 2023-06-27
Attending: PHYSICIAN ASSISTANT
Payer: MEDICARE

## 2023-06-27 DIAGNOSIS — R52 PAIN AGGRAVATED BY ACTIVITIES OF DAILY LIVING: ICD-10-CM

## 2023-06-27 DIAGNOSIS — M54.41 ACUTE MIDLINE LOW BACK PAIN WITH RIGHT-SIDED SCIATICA: ICD-10-CM

## 2023-06-27 PROCEDURE — 97167 OT EVAL HIGH COMPLEX 60 MIN: CPT

## 2023-06-27 PROCEDURE — 97530 THERAPEUTIC ACTIVITIES: CPT
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
## 2023-06-29 ENCOUNTER — CLINICAL SUPPORT (OUTPATIENT)
Dept: REHABILITATION | Facility: HOSPITAL | Age: 58
End: 2023-06-29
Payer: MEDICARE

## 2023-06-29 DIAGNOSIS — R52 PAIN AGGRAVATED BY ACTIVITIES OF DAILY LIVING: Primary | ICD-10-CM

## 2023-06-29 DIAGNOSIS — M54.41 ACUTE MIDLINE LOW BACK PAIN WITH RIGHT-SIDED SCIATICA: Chronic | ICD-10-CM

## 2023-06-29 PROCEDURE — 97530 THERAPEUTIC ACTIVITIES: CPT

## 2023-06-29 NOTE — PROGRESS NOTES
"[facility]  Occupational Therapy Treatment Note   Aquatic Therapy Day 2    Name: Aisha Muñoz  MRN:[id number]  Encounter Date: 6/29/2023  Clinic Number: [id number]    Therapy Diagnosis:   Encounter Diagnoses   Name Primary?    Pain aggravated by activities of daily living Yes    Acute midline low back pain with right-sided sciatica      Referring provider: Cait Fritz PA-C  Visit Date: 6/29/2023  Physician Orders: Aquatic Therapy     Medical Diagnosis from Referral: Acute midline low back pain with right-sided sciatica [M54.41]  Evaluation Date: 6/27/2023  Authorization Period Expiration: 7/25/2023  Plan of Care Expiration: 8/27/2023  Visit # / Visits authorized: 1/ 23 (plus eval)      Precautions: Standard and Fall    SHERRI Visit #: -/5     Time In:1400  Time Out: 1500  Total Appointment Time: 60 minutes    Subjective   She reports "it feels good in the water. We'll see when I get out".     Response to previous treatment: Ms. Muñoz noted: no complaints    Functional change: Eval only at this time. Initiated aquatic OT      Aisha was partially compliant with parts of home exercise program given previously.   Writing therapist reminded Aisha of importance of completion of exercises and activities outside of session to attain goals.         Pain: 2/10  Location: R low back radiating into R leg (top and back of thigh) to knee. Occasional mm spasms in R thigh.   Description: "all of that"   Functional Exacerbations: Sitting, Standing, Walking for too long; stretching can cause spasms    Patient Specific Activities based on Personal goals:  Activity  Performance  (To be rated first session after eval) Satisfaction      Walking (at work)       2.  Home maintenance       3.  lifting       4.  Grocery shopping       5.  LB dress               Total Score          Patient Specific Activity Scoring Scheme for Performance     0        1        2        3        4        5        6        7        8        9  "       10     Unable                                                                                     Able to perform  To perform                                                                              activity at same  Activity                                                                                    level as before                                                                          Objective     Objective Measures updated at progress report unless specified.     Treatment     Tech and OT present for all exercises     Aisha received the treatments listed below:     Therapeutic activities in order to increase participation in, endurance for, and performance with vocational, selfcare ADLs, and leisure activities in order to improve quality of life, for 60  minutes, including:    Introduction to stretching as tool to manage discomfort, as well as importance, and use of JANELL scale to guide pacing.     Personal goals reviewed and updated as needed. Aisha rated performance of activities related to personal goals, as well as satisfaction of said performance regarding. (Refer to subjective section for details).      FUNCTIONAL MOBILITY TRAINING x 2 laps each at beginning and 1 lap each at end of session  Walk forward/backward/lateral    STRETCHES 2 x 30sec  hamstring    For improved functional gait, squatting, sitting tolerance, functional balance, bed mobility & rolling, bending over, cooking & cleaning:  LE EX x 20  Squat - NP  Heel raise with gluteal set - NP  Hip abduction/adduction  Hip flex/ext      Improve overhead reaching/activities, standing tolerance, lifting mechanics, showering/bathing, dressing, cooking, reaching, pushing & fine motor skills:  UE EX/CORE  x 20  Shoulder flex/ext TA activation paddles CLOSED  Shoulder horizontal abd/add TA activation paddles CLOSED  Mini squat with push/pull BLUE kickboard      Improved activity tolerance and community accessibility:   ENDURANCE  Bicycle  in // bars 2  Torso twist // bars - NP         No environmental, cultural, spiritual, developmental or education needs expressed or noted.    Patient Education and Home Exercises     Home Exercises Provided and Patient Education Provided     Education provided:   - Role of aquatic therapy  - Hydration post therapy  - Progress towards goals   - FRP Educational Topics: Modified Kang Exertion Scale, Physical & Psychological Pain Cycles, Pursed Lip Breathing, Activity Pacing for Pain & Debility , and Posture & Body Mechanics     Written Home Exercises Provided: yes.  Exercises were reviewed and Aisha was able to demonstrate them prior to the end of the session.  Aisha demonstrated fair  understanding of the HEP provided.     See EMR under Patient Instructions for exercises provided during therapy sessions.     Assessment     Ms. Muñoz presents with good engagement, required extensive cues for correct form in today's exercises as well as to coordinate breath with activity. Good strength noted, paddles closed for increased resistance 2* noted ease with activity.     Aisha is progressing well towards her goals and status of goals can be seen below. Patient's prognosis is Good.     Aisha would continue to benefit from skilled outpatient occupational therapy to address the deficits listed in the problem list on initial evaluation, provide patient education, and to maximize patient's level of independence in the home and community environment.     Anticipated barriers to occupational therapy: comorbidities     Goals:     Short Term Goals: 4 weeks  Demonstrate correct use of breathing techniques (Diaphragmatic breathing & Pursed- Lip Breathing) to promote muscles of inspiration and expiration and to improve aerobic endurance for ADL performance.  Initiated at Evaluation , progressing not met 6/27/2023   Demonstrate improved proprioception and body mechanics with functional movement both in pool and during transition to  pool, to increase independence with home management tasks as demonstrated by decreased tactile and verbal cues for compensatory techniques in movement.  Initiated at Evaluation , progressing not met 6/27/2023   Verbalize importance of mindfulness, stress management, and coping strategies for improved participation in daily routine.  Initiated at Evaluation , progressing not met 6/27/2023   Pt will importance & demonstrate use of energy conservation & pacing strategies during therapy session to improve tolerance for work and home demands.   Initiated at Evaluation , progressing not met 6/27/2023   Tolerate Home Activity Plan (HAP)/ Home Exercise Program (HEP) to promote safety and independence with ADLs/IADLs.  Initiated at Evaluation , progressing not met 6/27/2023         Long Term Goals: 8 weeks  Pt will verbalize plan for independence with scheduling week to include functional exercise and implementation of HEP, to include proper pacing & mindfulness concepts.   Initiated at Evaluation , progressing not met 6/27/2023   Implement self-care strategies during ADL and activity participation to manage pain. Initiated at Evaluation , progressing not met 6/27/2023   Demonstrate increased activity tolerance to the level needed for work, home, and community activities, including standing to complete meal preparation, sitting during , attending neighborhood meetings, waiting at transportation hub or in queue for restaurant and/or movies, ability to attend [employer], festivals, concerts, as evidenced by increasing to 40 steps in the 2 Minute Step Test.   Initiated at Evaluation , progressing not met 6/27/2023   Completes 5x sit to stand test in 22 seconds or less to improve ability to participate in community mobility.    Initiated at Evaluation , progressing not met 6/27/2023   Patient will demonstrate improved functional stability for safe completion of self-care ADLs as demonstrated by increased reach on FRT by 2  inches.   Initiated at Evaluation , progressing not met 6/27/2023   Pt will demonstrate improved balance and coordination for safety in management of ADLs as demonstrated by decreased time to complete Four Square Step Test by 8 seconds.  Initiated at Evaluation , progressing not met 6/27/2023   Demonstrates and self-reports implementation of HEP and aquatic therapy educational components into daily schedule (stretching, mindfulness, breathing, & pacing)  Initiated at Evaluation , progressing not met 6/27/2023   Demonstrate implementation of pacing and energy conservation principles through maintenance of JANELL exertion scale rating in daily activity between 3-5.    Initiated at Evaluation , progressing not met 6/27/2023          Plan     Continue per plan of care.     Updates/Grading for next session: CLOSE latonia Leonard OT   6/29/2023

## 2023-07-06 ENCOUNTER — CLINICAL SUPPORT (OUTPATIENT)
Dept: REHABILITATION | Facility: HOSPITAL | Age: 58
End: 2023-07-06
Payer: MEDICARE

## 2023-07-06 DIAGNOSIS — R52 PAIN AGGRAVATED BY ACTIVITIES OF DAILY LIVING: Primary | ICD-10-CM

## 2023-07-06 DIAGNOSIS — M54.41 ACUTE MIDLINE LOW BACK PAIN WITH RIGHT-SIDED SCIATICA: Chronic | ICD-10-CM

## 2023-07-06 PROCEDURE — 97113 AQUATIC THERAPY/EXERCISES: CPT

## 2023-07-11 ENCOUNTER — DOCUMENTATION ONLY (OUTPATIENT)
Dept: REHABILITATION | Facility: HOSPITAL | Age: 58
End: 2023-07-11
Payer: MEDICARE

## 2023-07-11 NOTE — PROGRESS NOTES
Occupational Therapy Missed Treatment      Patient Name:  Aisha Muñoz   MRN:  [id number]    Patient marked as no-show for today's 2pm Aquatic OT session.   Pt contacted by phone, no answer.   Next visit scheduled for 7/13 at 2pm     No show 1/3.   Padmaja Leonard, OT  7/11/2023

## 2023-07-18 ENCOUNTER — DOCUMENTATION ONLY (OUTPATIENT)
Dept: REHABILITATION | Facility: HOSPITAL | Age: 58
End: 2023-07-18
Payer: MEDICARE

## 2023-07-18 NOTE — PROGRESS NOTES
Occupational Therapy Missed Treatment      Patient Name:  Aisha Muñoz   MRN:  [id number]    Patient marked as no-show for today's 2pm Aquatic OT session.   Pt contacted by phone, no answer.   Next visit scheduled for 7/25 at 2pm      No show 2/3.   Pt to be discharged from therapy if 3/3 no-shows    Padmaja Leonard, OT  7/18/2023

## 2023-07-25 ENCOUNTER — CLINICAL SUPPORT (OUTPATIENT)
Dept: REHABILITATION | Facility: HOSPITAL | Age: 58
End: 2023-07-25
Payer: MEDICARE

## 2023-07-25 DIAGNOSIS — M54.41 ACUTE MIDLINE LOW BACK PAIN WITH RIGHT-SIDED SCIATICA: Chronic | ICD-10-CM

## 2023-07-25 DIAGNOSIS — R52 PAIN AGGRAVATED BY ACTIVITIES OF DAILY LIVING: Primary | ICD-10-CM

## 2023-07-25 PROCEDURE — 97113 AQUATIC THERAPY/EXERCISES: CPT

## 2023-07-25 NOTE — PROGRESS NOTES
"[facility]  Occupational Therapy Treatment Note   Aquatic Therapy Day 3    Name: Aisha Muñoz  MRN:[id number]  Encounter Date: 7/25/2023  Clinic Number: [id number]    Therapy Diagnosis:   Encounter Diagnoses   Name Primary?    Pain aggravated by activities of daily living Yes    Acute midline low back pain with right-sided sciatica        Referring provider: Cait Fritz PA-C  Visit Date: 7/25/2023  Physician Orders: Aquatic Therapy     Medical Diagnosis from Referral: Acute midline low back pain with right-sided sciatica [M54.41]  Evaluation Date: 6/27/2023  Authorization Period Expiration: 7/25/2023  Plan of Care Expiration: 8/27/2023  Visit # / Visits authorized: 2/ 23 (plus eval)      Precautions: Standard and Fall    SHERRI Visit #: -/5     Time In:1400  Time Out: 1500  Total Appointment Time: 60 minutes    Subjective   She reports "we had two deaths in the family. I've been sorry to miss."     Response to previous treatment: Ms. Muñoz noted: no complaints    Functional change: n/a    Aisha was partially compliant with parts of home exercise program given previously 2* recent deaths in family.         Pain: 6/10  Location: R low back radiating into R leg (top and back of thigh) to knee. Occasional mm spasms in R thigh.   Description: grabbing  Functional Exacerbations: Sitting, Standing, Walking for too long; stretching can cause spasms    Patient Specific Activities based on Personal goals:  Activity  Performance  (To be rated first session after eval) Satisfaction      Walking (at work)  10 10    2.  Home maintenance 7 7   3.  lifting 7  7   4.  Grocery shopping 10  10    5.  LB dress 10  10            Total Score 8.8        Patient Specific Activity Scoring Scheme for Performance     0        1        2        3        4        5        6        7        8        9        10     Unable                                                                                     Able to perform  To " perform                                                                              activity at same  Activity                                                                                    level as before                                                                          Objective     Objective Measures updated at progress report unless specified.     Treatment     Tech and OT present for all exercises     Aisha received the treatments listed below:     Therapeutic activities in order to increase participation in, endurance for, and performance with vocational, selfcare ADLs, and leisure activities in order to improve quality of life, for 60  minutes, including:    Introduction to stretching as tool to manage discomfort, as well as importance, and use of JANELL scale to guide pacing.     Personal goals reviewed and updated as needed. Aisha rated performance of activities related to personal goals, as well as satisfaction of said performance regarding. (Refer to subjective section for details).      FUNCTIONAL MOBILITY TRAINING x 2 laps each at beginning and 1 lap each at end of session  Walk forward/backward/lateral    STRETCHES 2 x 30sec  hamstring    For improved functional gait, squatting, sitting tolerance, functional balance, bed mobility & rolling, bending over, cooking & cleaning:  LE EX x 20  Squat   Heel raise with gluteal set - NP  Hip abduction/adduction  Hip flex/ext    Improve overhead reaching/activities, standing tolerance, lifting mechanics, showering/bathing, dressing, cooking, reaching, pushing & fine motor skills:  UE EX/CORE  x 20  Shoulder flex/ext TA activation closed paddles  Shoulder abduction/adduction TA activation closed paddles  Mini squat with push/pull BLUE kickboard    Improved activity tolerance and community accessibility:   ENDURANCE  Bicycle in // bars 2 minutes  Torso twist // bars - 2 minutes     No environmental, cultural, spiritual, developmental or education needs  expressed or noted.    Patient Education and Home Exercises     Home Exercises Provided and Patient Education Provided     Education provided:   - Role of aquatic therapy  - Hydration post therapy  - Progress towards goals   - FRP Educational Topics: Modified Kang Exertion Scale, Physical & Psychological Pain Cycles, Pursed Lip Breathing, Activity Pacing for Pain & Debility , and Posture & Body Mechanics   - importance of completion of exercises and activities outside of session to attain goals.     Written Home Exercises Provided: yes.  Exercises were reviewed and Aisha was able to demonstrate them prior to the end of the session.  Aisha demonstrated fair  understanding of the HEP provided. Discussed pacing. Aisha tends to do activity all at once when she is feeling well and then hurts after.     See EMR under Patient Instructions for exercises provided during therapy sessions.     Assessment     Ms. Muñoz presents with good engagement, required moderate cues for correct form in today's exercises as well as to coordinate breath with activity. Pain from not pacing. She quickly resolves pain with pool activity and relaxation.     Aisha is progressing well towards her goals and status of goals can be seen below. Patient's prognosis is Good.     Aisha would continue to benefit from skilled outpatient occupational therapy to address the deficits listed in the problem list on initial evaluation, provide patient education, and to maximize patient's level of independence in the home and community environment.     Anticipated barriers to occupational therapy: comorbidities     Goals:     Short Term Goals: 4 weeks  Demonstrate correct use of breathing techniques (Diaphragmatic breathing & Pursed- Lip Breathing) to promote muscles of inspiration and expiration and to improve aerobic endurance for ADL performance.  Initiated at Evaluation , progressing not met 6/27/2023   Demonstrate improved proprioception and body  mechanics with functional movement both in pool and during transition to pool, to increase independence with home management tasks as demonstrated by decreased tactile and verbal cues for compensatory techniques in movement.  Initiated at Evaluation , progressing not met 6/27/2023   Verbalize importance of mindfulness, stress management, and coping strategies for improved participation in daily routine.  Initiated at Evaluation , progressing not met 6/27/2023   Pt will importance & demonstrate use of energy conservation & pacing strategies during therapy session to improve tolerance for work and home demands.   Initiated at Evaluation , progressing not met 6/27/2023   Tolerate Home Activity Plan (HAP)/ Home Exercise Program (HEP) to promote safety and independence with ADLs/IADLs.  Initiated at Evaluation , progressing not met 6/27/2023         Long Term Goals: 8 weeks  Pt will verbalize plan for independence with scheduling week to include functional exercise and implementation of HEP, to include proper pacing & mindfulness concepts.   Initiated at Evaluation , progressing not met 6/27/2023   Implement self-care strategies during ADL and activity participation to manage pain. Initiated at Evaluation , progressing not met 6/27/2023   Demonstrate increased activity tolerance to the level needed for work, home, and community activities, including standing to complete meal preparation, sitting during , attending neighborhood meetings, waiting at transportation hub or in queue for restaurant and/or movies, ability to attend [employer], festivals, concerts, as evidenced by increasing to 40 steps in the 2 Minute Step Test.   Initiated at Evaluation , progressing not met 6/27/2023   Completes 5x sit to stand test in 22 seconds or less to improve ability to participate in community mobility.    Initiated at Evaluation , progressing not met 6/27/2023   Patient will demonstrate improved functional stability for safe  completion of self-care ADLs as demonstrated by increased reach on FRT by 2 inches.   Initiated at Evaluation , progressing not met 6/27/2023   Pt will demonstrate improved balance and coordination for safety in management of ADLs as demonstrated by decreased time to complete Four Square Step Test by 8 seconds.  Initiated at Evaluation , progressing not met 6/27/2023   Demonstrates and self-reports implementation of HEP and aquatic therapy educational components into daily schedule (stretching, mindfulness, breathing, & pacing)  Initiated at Evaluation , progressing not met 6/27/2023   Demonstrate implementation of pacing and energy conservation principles through maintenance of JANELL exertion scale rating in daily activity between 3-5.    Initiated at Evaluation , progressing not met 6/27/2023          Plan     Continue per plan of care.     Updates/Grading for next session: upgrade weights as tolerated, pacing activity and energy banking    Padmaja Leonard, OT   7/25/2023

## 2023-08-01 ENCOUNTER — CLINICAL SUPPORT (OUTPATIENT)
Dept: REHABILITATION | Facility: HOSPITAL | Age: 58
End: 2023-08-01
Payer: MEDICARE

## 2023-08-01 DIAGNOSIS — M54.41 ACUTE MIDLINE LOW BACK PAIN WITH RIGHT-SIDED SCIATICA: Chronic | ICD-10-CM

## 2023-08-01 DIAGNOSIS — R52 PAIN AGGRAVATED BY ACTIVITIES OF DAILY LIVING: Primary | ICD-10-CM

## 2023-08-01 PROCEDURE — 97113 AQUATIC THERAPY/EXERCISES: CPT

## 2023-08-01 NOTE — PROGRESS NOTES
"[facility]  Occupational Therapy Treatment Note   Aquatic Therapy Day 3    Name: Aisha Muñoz  MRN:[id number]  Encounter Date: 8/1/2023  Clinic Number: [id number]    Therapy Diagnosis:   Encounter Diagnoses   Name Primary?    Pain aggravated by activities of daily living Yes    Acute midline low back pain with right-sided sciatica        Referring provider: Cait Fritz PA-C  Visit Date: 8/1/2023  Physician Orders: Aquatic Therapy     Medical Diagnosis from Referral: Acute midline low back pain with right-sided sciatica [M54.41]  Evaluation Date: 6/27/2023  Authorization Period Expiration: 7/25/2023  Plan of Care Expiration: 8/27/2023  Visit # / Visits authorized: 2/ 23 (plus eval)      Precautions: Standard and Fall    SHERRI Visit #: -/5     Time In:1400  Time Out: 1500  Total Appointment Time: 60 minutes    Subjective   She reports "I'm better today, I wasn't sore"     Response to previous treatment: Ms. Muñoz noted: no complaints    Functional change: n/a    Aisha was partially compliant with parts of home exercise program given previously 2* recent deaths in family.         Pain: 6/10  Location: R low back radiating into R leg (top and back of thigh) to knee. Occasional mm spasms in R thigh.   Description: grabbing  Functional Exacerbations: Sitting, Standing, Walking for too long; stretching can cause spasms    Patient Specific Activities based on Personal goals:  Activity  Performance  (To be rated first session after eval) Satisfaction      Walking (at work)  10 10    2.  Home maintenance 7 7   3.  lifting 7  7   4.  Grocery shopping 10  10    5.  LB dress 10  10            Total Score 8.8        Patient Specific Activity Scoring Scheme for Performance     0        1        2        3        4        5        6        7        8        9        10     Unable                                                                                     Able to perform  To perform                         "                                                      activity at same  Activity                                                                                    level as before                                                                          Objective     Objective Measures updated at progress report unless specified.     Treatment     Tech and OT present for all exercises     Aisha received the treatments listed below:     Therapeutic activities in order to increase participation in, endurance for, and performance with vocational, selfcare ADLs, and leisure activities in order to improve quality of life, for 60  minutes, including:    Introduction to stretching as tool to manage discomfort, as well as importance, and use of JANELL scale to guide pacing.     Personal goals reviewed and updated as needed. Aisha rated performance of activities related to personal goals, as well as satisfaction of said performance regarding. (Refer to subjective section for details).      FUNCTIONAL MOBILITY TRAINING x 2 laps each at beginning and 1 lap each at end of session  Walk forward/backward/lateral    STRETCHES 2 x 30sec  hamstring    For improved functional gait, squatting, sitting tolerance, functional balance, bed mobility & rolling, bending over, cooking & cleaning:  LE EX x 20  Squat   Heel raise with gluteal set - NP  Hip abduction/adduction  Hip flex/ext    Improve overhead reaching/activities, standing tolerance, lifting mechanics, showering/bathing, dressing, cooking, reaching, pushing & fine motor skills:  UE EX/CORE  x 20  Shoulder flex/ext TA activation closed paddles  Shoulder abduction/adduction TA activation closed paddles  Mini squat with push/pull BLUE kickboard    Improved activity tolerance and community accessibility:   ENDURANCE  Bicycle in // bars 3 minutes  Torso twist // bars - 3 minutes     No environmental, cultural, spiritual, developmental or education needs expressed or noted.    Patient  Education and Home Exercises     Home Exercises Provided and Patient Education Provided     Education provided:   - Role of aquatic therapy  - Hydration post therapy  - Progress towards goals   - FRP Educational Topics: Modified Kang Exertion Scale, Physical & Psychological Pain Cycles, Pursed Lip Breathing, Activity Pacing for Pain & Debility , and Posture & Body Mechanics   - importance of completion of exercises and activities outside of session to attain goals.     Written Home Exercises Provided: yes.  Exercises were reviewed and Aisha was able to demonstrate them prior to the end of the session.  Aisha demonstrated fair  understanding of the HEP provided. Discussed pacing. Aisha tends to do activity all at once when she is feeling well and then hurts after.     See EMR under Patient Instructions for exercises provided during therapy sessions.     Assessment     Ms. Muñoz presents with good engagement, required moderate cues for correct form in today's exercises as well as to coordinate breath with activity. Pain from not pacing. She quickly resolves pain with pool activity and relaxation.     Aisha is progressing well towards her goals and status of goals can be seen below. Patient's prognosis is Good.     Aisha would continue to benefit from skilled outpatient occupational therapy to address the deficits listed in the problem list on initial evaluation, provide patient education, and to maximize patient's level of independence in the home and community environment.     Anticipated barriers to occupational therapy: comorbidities     Goals:     Short Term Goals: 4 weeks  Demonstrate correct use of breathing techniques (Diaphragmatic breathing & Pursed- Lip Breathing) to promote muscles of inspiration and expiration and to improve aerobic endurance for ADL performance.  Initiated at Evaluation , progressing not met 6/27/2023   Demonstrate improved proprioception and body mechanics with functional movement  both in pool and during transition to pool, to increase independence with home management tasks as demonstrated by decreased tactile and verbal cues for compensatory techniques in movement.  Initiated at Evaluation , progressing not met 6/27/2023   Verbalize importance of mindfulness, stress management, and coping strategies for improved participation in daily routine.  Initiated at Evaluation , progressing not met 6/27/2023   Pt will importance & demonstrate use of energy conservation & pacing strategies during therapy session to improve tolerance for work and home demands.   Initiated at Evaluation , progressing not met 6/27/2023   Tolerate Home Activity Plan (HAP)/ Home Exercise Program (HEP) to promote safety and independence with ADLs/IADLs.  Initiated at Evaluation , progressing not met 6/27/2023         Long Term Goals: 8 weeks  Pt will verbalize plan for independence with scheduling week to include functional exercise and implementation of HEP, to include proper pacing & mindfulness concepts.   Initiated at Evaluation , progressing not met 6/27/2023   Implement self-care strategies during ADL and activity participation to manage pain. Initiated at Evaluation , progressing not met 6/27/2023   Demonstrate increased activity tolerance to the level needed for work, home, and community activities, including standing to complete meal preparation, sitting during , attending neighborhood meetings, waiting at transportation hub or in queue for restaurant and/or movies, ability to attend [employer], festivals, concerts, as evidenced by increasing to 40 steps in the 2 Minute Step Test.   Initiated at Evaluation , progressing not met 6/27/2023   Completes 5x sit to stand test in 22 seconds or less to improve ability to participate in community mobility.    Initiated at Evaluation , progressing not met 6/27/2023   Patient will demonstrate improved functional stability for safe completion of self-care ADLs as  demonstrated by increased reach on FRT by 2 inches.   Initiated at Evaluation , progressing not met 6/27/2023   Pt will demonstrate improved balance and coordination for safety in management of ADLs as demonstrated by decreased time to complete Four Square Step Test by 8 seconds.  Initiated at Evaluation , progressing not met 6/27/2023   Demonstrates and self-reports implementation of HEP and aquatic therapy educational components into daily schedule (stretching, mindfulness, breathing, & pacing)  Initiated at Evaluation , progressing not met 6/27/2023   Demonstrate implementation of pacing and energy conservation principles through maintenance of JANELL exertion scale rating in daily activity between 3-5.    Initiated at Evaluation , progressing not met 6/27/2023          Plan     Continue per plan of care.     Updates/Grading for next session: upgrade weights as tolerated, pacing activity and energy banking    Padmaja Leonard, OT   8/1/2023

## 2023-08-03 ENCOUNTER — CLINICAL SUPPORT (OUTPATIENT)
Dept: REHABILITATION | Facility: HOSPITAL | Age: 58
End: 2023-08-03
Payer: MEDICARE

## 2023-08-03 DIAGNOSIS — M54.41 ACUTE MIDLINE LOW BACK PAIN WITH RIGHT-SIDED SCIATICA: Chronic | ICD-10-CM

## 2023-08-03 DIAGNOSIS — R52 PAIN AGGRAVATED BY ACTIVITIES OF DAILY LIVING: Primary | ICD-10-CM

## 2023-08-03 PROCEDURE — 97113 AQUATIC THERAPY/EXERCISES: CPT

## 2023-08-03 NOTE — PROGRESS NOTES
"[facility]  Occupational Therapy Treatment Note   Aquatic Therapy Day 4    Name: Aisha Muñoz  MRN:[id number]  Encounter Date: 8/3/2023  Clinic Number: [id number]    Therapy Diagnosis:   Encounter Diagnoses   Name Primary?    Pain aggravated by activities of daily living Yes    Acute midline low back pain with right-sided sciatica      Referring provider: Cait Fritz PA-C  Physician Orders: Aquatic Therapy     Medical Diagnosis from Referral: Acute midline low back pain with right-sided sciatica [M54.41]  Evaluation Date: 6/27/2023  Authorization Period Expiration: 7/25/2023  Plan of Care Expiration: 8/27/2023  Visit # / Visits authorized: 4/ 23 (plus eval)      Precautions: Standard and Fall    SHERRI Visit #: -/5     Time In:1400  Time Out: 1500  Total Appointment Time: 60 minutes    Subjective   She reports "I'm better today, I wasn't sore" she also stated that she may have some difficulty with her scheduling because of a new job, but plans to let the team know     Response to previous treatment: Ms. Muñoz noted: no complaints    Functional change: n/a     Aisha was partially compliant with parts of home exercise program. States she is limited by work schedule.   HEP to date: full body stretch         Pain: 6/10  Location: R low back radiating into R leg (top and back of thigh) to knee. Occasional mm spasms in R thigh.   Description: grabbing  Functional Exacerbations: Sitting, Standing, Walking for too long; stretching can cause spasms    Patient Specific Activities based on Personal goals:  Activity  Performance  (To be rated first session after eval) Satisfaction      Walking (at work)  10 10    2.  Home maintenance 7 7   3.  lifting 7  7   4.  Grocery shopping 10  10    5.  LB dress 10  10            Total Score 8.8        Patient Specific Activity Scoring Scheme for Performance          1        2        3        4        5        6        7        8        9        10     Unable            "                                                                          Able to perform  To perform                                                                              activity at same  Activity                                                                                    level as before                                                                          Objective     Objective Measures updated at progress report unless specified.     Treatment     Tech and OT present for all exercises     Aisha received the treatments listed below:     Therapeutic activities in order to increase participation in, endurance for, and performance with vocational, selfcare ADLs, and leisure activities in order to improve quality of life, for 60 minutes, including:    Introduction to stretching as tool to manage discomfort, as well as importance, and use of JANELL scale to guide pacing.     FUNCTIONAL MOBILITY TRAINING x 2 laps each at beginning and 1 lap each at end of session  Walk forward/backward/lateral     STRETCHES 2 x 30sec  Hamstring  Seated hip stretches (figure 4 and hip hug)      For improved functional gait, squatting, sitting tolerance, functional balance, bed mobility & rolling, bending over, cooking & cleaning:  LE EX x 20  Squat    Heel raise with gluteal set - NP  Hip abduction/adduction   Hip flex/ext     Improve overhead reaching/activities, standing tolerance, lifting mechanics, showering/bathing, dressing, cooking, reaching, pushing & fine motor skills:  UE EX/CORE  x 20  Shoulder flex/ext TA activation closed  paddles  Shoulder abduction/adduction TA activation closed  paddles  Mini squat with push/pull BLUE  kickboard    Improved activity tolerance and community accessibility:   ENDURANCE  Bicycle in // bars 4 minutes  Torso twist // bars - 3  minutes     No environmental, cultural, spiritual, developmental or education needs expressed or noted.    Patient Education and Home Exercises     Home  Exercises Provided and Patient Education Provided     Education provided:   - Role of aquatic therapy  - Hydration post therapy  - Progress towards goals   - FRP Educational Topics: Modified Kang Exertion Scale, Physical & Psychological Pain Cycles, Pursed Lip Breathing, Activity Pacing for Pain & Debility , and Posture & Body Mechanics   - importance of completion of exercises and activities outside of session to attain goals.     Written Home Exercises Provided: yes.  Exercises were reviewed and Aisha was able to demonstrate them prior to the end of the session.  Aisha demonstrated fair  understanding of the HEP provided. Discussed pacing. Aisha tends to do activity all at once when she is feeling well and then hurts after.     See EMR under Patient Instructions for exercises provided during therapy sessions.     Assessment     Ms. Muñoz presents with good engagement, required moderate cues for correct form in today's exercises as well as to coordinate breath with activity. Endorsed difficulty with scheduling and completion of HEP 2* work schedule.     Aisha is progressing well towards her goals and status of goals can be seen below. Patient's prognosis is Good.     Aisha would continue to benefit from skilled outpatient occupational therapy to address the deficits listed in the problem list on initial evaluation, provide patient education, and to maximize patient's level of independence in the home and community environment.     Anticipated barriers to occupational therapy: comorbidities     Goals:     Short Term Goals: 4 weeks Status of Goal: Reviewed on 8/3/2023    Demonstrate correct use of breathing techniques (Diaphragmatic breathing & Pursed- Lip Breathing) to promote muscles of inspiration and expiration and to improve aerobic endurance for ADL performance.  In Progress   Demonstrate improved proprioception and body mechanics with functional movement both in pool and during transition to pool, to  increase independence with home management tasks as demonstrated by decreased tactile and verbal cues for compensatory techniques in movement.  In Progress   Verbalize importance of mindfulness, stress management, and coping strategies for improved participation in daily routine.  In Progress   Pt will importance & demonstrate use of energy conservation & pacing strategies during therapy session to improve tolerance for work and home demands.   In Progress   Tolerate Home Activity Plan (HAP)/ Home Exercise Program (HEP) to promote safety and independence with ADLs/IADLs.  In Progress        Long Term Goals: 8 weeks Progress: reviewed on 8/3/2023    Pt will verbalize plan for independence with scheduling week to include functional exercise and implementation of HEP, to include proper pacing & mindfulness concepts.   In Progress   Implement self-care strategies during ADL and activity participation to manage pain. In Progress   Demonstrate increased activity tolerance to the level needed for work, home, and community activities, including standing to complete meal preparation, sitting during , attending neighborhood meetings, waiting at transportation hub or in queue for restaurant and/or movies, ability to attend [employer], festivals, concerts, as evidenced by increasing to 40 steps in the 2 Minute Step Test.   In Progress   Completes 5x sit to stand test in 22 seconds or less to improve ability to participate in community mobility.    In Progress   Patient will demonstrate improved functional stability for safe completion of self-care ADLs as demonstrated by increased reach on FRT by 2 inches.   In Progress   Pt will demonstrate improved balance and coordination for safety in management of ADLs as demonstrated by decreased time to complete Four Square Step Test by 8 seconds.  In Progress   Demonstrates and self-reports implementation of HEP and aquatic therapy educational components into daily schedule  (stretching, mindfulness, breathing, & pacing)  In Progress   Demonstrate implementation of pacing and energy conservation principles through maintenance of JANELL exertion scale rating in daily activity between 3-5.    In Progress          Plan     Continue per plan of care.     Updates/Grading for next session: upgrade weights as tolerated, pacing activity and energy banking    Padmaja Leonard, OT   8/3/2023

## 2023-08-08 ENCOUNTER — CLINICAL SUPPORT (OUTPATIENT)
Dept: REHABILITATION | Facility: HOSPITAL | Age: 58
End: 2023-08-08
Payer: MEDICARE

## 2023-08-08 DIAGNOSIS — M54.41 ACUTE MIDLINE LOW BACK PAIN WITH RIGHT-SIDED SCIATICA: Chronic | ICD-10-CM

## 2023-08-08 DIAGNOSIS — M54.41 ACUTE MIDLINE LOW BACK PAIN WITH RIGHT-SIDED SCIATICA: Primary | ICD-10-CM

## 2023-08-08 DIAGNOSIS — R52 PAIN AGGRAVATED BY ACTIVITIES OF DAILY LIVING: Primary | ICD-10-CM

## 2023-08-08 PROCEDURE — 97530 THERAPEUTIC ACTIVITIES: CPT

## 2023-08-08 NOTE — PROGRESS NOTES
"[facility]  Occupational Therapy Treatment Note   Aquatic Therapy Day 5    Name: Aisha Muñoz  MRN:[id number]  Encounter Date: 8/8/2023  Clinic Number: [id number]    Therapy Diagnosis:   Encounter Diagnoses   Name Primary?    Pain aggravated by activities of daily living Yes    Acute midline low back pain with right-sided sciatica      Referring provider: Cait Fritz PA-C  Physician Orders: Aquatic Therapy     Medical Diagnosis from Referral: Acute midline low back pain with right-sided sciatica [M54.41]  Evaluation Date: 6/27/2023  Authorization Period Expiration: 7/25/2023  Plan of Care Expiration: 8/27/2023  Visit # / Visits authorized: 4/23 (plus eval)      Precautions: Standard and Fall    SHERRI Visit #: -/5     Time In: 1400  Time Out: 1500  Total Appointment Time: 60 minutes    Subjective   She reports "today is not a good day, I couldn't go into work, I would have rated the pain a 20/10, but now its more like an 8" at end of session, pt endorsed no significant change in pain,  "its still there, that's why I didn't do as much" and "I got a new job, I started yesterday, I don't have much time off"     Response to previous treatment: Ms. Muñoz noted: no complaints    Functional change: n/a     Aisha was partially compliant with parts of home exercise program. States she is limited by work schedule.   HEP to date: full body stretch         Pain: 8/10  Location: R low back radiating into R leg (top and back of thigh) to knee. Occasional mm spasms in R thigh.   Description: grabbing  Functional Exacerbations: Sitting, Standing, Walking for too long; stretching can cause spasms    Patient Specific Activities based on Personal goals:  Activity  Performance  (To be rated first session after eval) Satisfaction      Walking (at work)  10 10    2.  Home maintenance 7 7   3.  lifting 7  7   4.  Grocery shopping 10  10    5.  LB dress 10  10            Total Score 8.8        Patient Specific Activity " Scoring Scheme for Performance          1        2        3        4        5        6        7        8        9        10     Unable                                                                                     Able to perform  To perform                                                                              activity at same  Activity                                                                                    level as before                                                                          Objective     Objective Measures updated at progress report unless specified.     Treatment     Tech and OT present for all exercises     Aisha received the treatments listed below:     Therapeutic activities in order to increase participation in, endurance for, and performance with vocational, selfcare ADLs, and leisure activities in order to improve quality of life, for 60 minutes, including:    Introduction to stretching as tool to manage discomfort, as well as importance, and use of JANELL scale to guide pacing.     FUNCTIONAL MOBILITY TRAINING x 2 laps each at beginning and 1 lap each at end of session  Walk forward/backward/lateral     STRETCHES 2 x 30sec  Hamstring  Seated hip stretches (figure 4 and hip hug)      For improved functional gait, squatting, sitting tolerance, functional balance, bed mobility & rolling, bending over, cooking & cleaning:  LE EX x 20  Squat    Heel raise with gluteal set    Hip abduction/adduction   Hip flex/ext     Improve overhead reaching/activities, standing tolerance, lifting mechanics, showering/bathing, dressing, cooking, reaching, pushing & fine motor skills:  UE EX/CORE  x 20  Shoulder flex/ext TA activation closed  paddles - cues for form, tendency to lean forward and round at back  Shoulder abduction/adduction TA activation closed  paddles - cues for form, tendency to lean forward and round at back  Mini squat with push/pull BLUE  kickboard - cues for form,  tendency to lean forward and round at back    Improved activity tolerance and community accessibility:   ENDURANCE  Bicycle in // bars 3 minutes  Torso twist // bars - 3  minutes     No environmental, cultural, spiritual, developmental or education needs expressed or noted.    Patient Education and Home Exercises     Home Exercises Provided and Patient Education Provided     Education provided:   - Role of aquatic therapy  - Hydration post therapy  - Progress towards goals   - FRP Educational Topics: Modified Kang Exertion Scale, Physical & Psychological Pain Cycles, Pursed Lip Breathing, Activity Pacing for Pain & Debility , and Posture & Body Mechanics   - importance of completion of exercises and activities outside of session to attain goals.     Written Home Exercises Provided: yes.  Exercises were reviewed and Aisha was able to demonstrate them prior to the end of the session.  Aisha demonstrated fair  understanding of the HEP provided. Discussed pacing. Aisha tends to do activity all at once when she is feeling well and then hurts after.     See EMR under Patient Instructions for exercises provided during therapy sessions.     Assessment     Ms. Muñoz limited by heightened pain this date, endorsed little impact of exercise and pool activity on pain rating.  Increased guarding in shoulders and limited application of cues to coordinate breath with activity noted, though pt pleasant and verbalized attempts at this coordination. Pt unaware of impact of psychosocial factors on her pain presentation.     Aisha is progressing well towards her goals and status of goals can be seen below. Patient's prognosis is Good.     Aisha would continue to benefit from skilled outpatient occupational therapy to address the deficits listed in the problem list on initial evaluation, provide patient education, and to maximize patient's level of independence in the home and community environment.     Anticipated barriers to  occupational therapy: comorbidities     Goals:     Short Term Goals: 4 weeks Status of Goal: Reviewed on 8/8/2023    Demonstrate correct use of breathing techniques (Diaphragmatic breathing & Pursed- Lip Breathing) to promote muscles of inspiration and expiration and to improve aerobic endurance for ADL performance.  In Progress   Demonstrate improved proprioception and body mechanics with functional movement both in pool and during transition to pool, to increase independence with home management tasks as demonstrated by decreased tactile and verbal cues for compensatory techniques in movement.  In Progress   Verbalize importance of mindfulness, stress management, and coping strategies for improved participation in daily routine.  In Progress   Pt will importance & demonstrate use of energy conservation & pacing strategies during therapy session to improve tolerance for work and home demands.   In Progress   Tolerate Home Activity Plan (HAP)/ Home Exercise Program (HEP) to promote safety and independence with ADLs/IADLs.  In Progress        Long Term Goals: 8 weeks Progress: reviewed on 8/8/2023    Pt will verbalize plan for independence with scheduling week to include functional exercise and implementation of HEP, to include proper pacing & mindfulness concepts.   In Progress   Implement self-care strategies during ADL and activity participation to manage pain. In Progress   Demonstrate increased activity tolerance to the level needed for work, home, and community activities, including standing to complete meal preparation, sitting during , attending neighborhood meetings, waiting at transportation hub or in queue for restaurant and/or movies, ability to attend Mosque, festivals, concerts, as evidenced by increasing to 40 steps in the 2 Minute Step Test.   In Progress   Completes 5x sit to stand test in 22 seconds or less to improve ability to participate in community mobility.    In Progress   Patient  will demonstrate improved functional stability for safe completion of self-care ADLs as demonstrated by increased reach on FRT by 2 inches.   In Progress   Pt will demonstrate improved balance and coordination for safety in management of ADLs as demonstrated by decreased time to complete Four Square Step Test by 8 seconds.  In Progress   Demonstrates and self-reports implementation of HEP and aquatic therapy educational components into daily schedule (stretching, mindfulness, breathing, & pacing)  In Progress   Demonstrate implementation of pacing and energy conservation principles through maintenance of JANELL exertion scale rating in daily activity between 3-5.    In Progress          Plan     Continue per plan of care.     Updates/Grading for next session: upgrade weights as tolerated, pacing activity and energy banking    Padmaja Leonard, OT   8/8/2023

## 2023-08-24 ENCOUNTER — DOCUMENTATION ONLY (OUTPATIENT)
Dept: REHABILITATION | Facility: HOSPITAL | Age: 58
End: 2023-08-24
Payer: MEDICARE

## 2023-08-24 NOTE — PROGRESS NOTES
[facility]  Discharge Note    Name: Aisha Muñoz  Clinic Number: [id number]    Therapy Diagnosis: No diagnosis found.  Physician: No ref. provider found    Referring provider: Cait Fritz PA-C  Physician Orders: Aquatic Therapy      Medical Diagnosis from Referral: Acute midline low back pain with right-sided sciatica [M54.41]  Evaluation Date: 6/27/2023  Authorization Period Expiration: 7/25/2023  Plan of Care Expiration: 8/27/2023  Visit # / Visits authorized: 4/23 (plus eval)       Date of Last visit: 8/8/2023  Total Visits Received: 4 (plus eval)    ASSESSMENT      Pt discharged 2* frequent missing sessions. Unable to attend regularly due to work schedule, and pt has requested discharge from Aquatic OT.       Goals:     Short Term Goals: 4 weeks Status of Goal: Reviewed on 8/8/2023    Demonstrate correct use of breathing techniques (Diaphragmatic breathing & Pursed- Lip Breathing) to promote muscles of inspiration and expiration and to improve aerobic endurance for ADL performance.  In Progress   Demonstrate improved proprioception and body mechanics with functional movement both in pool and during transition to pool, to increase independence with home management tasks as demonstrated by decreased tactile and verbal cues for compensatory techniques in movement.  In Progress   Verbalize importance of mindfulness, stress management, and coping strategies for improved participation in daily routine.  In Progress   Pt will importance & demonstrate use of energy conservation & pacing strategies during therapy session to improve tolerance for work and home demands.   In Progress   Tolerate Home Activity Plan (HAP)/ Home Exercise Program (HEP) to promote safety and independence with ADLs/IADLs.  In Progress         Long Term Goals: 8 weeks Progress: reviewed on 8/8/2023    Pt will verbalize plan for independence with scheduling week to include functional exercise and implementation  of HEP, to include proper pacing & mindfulness concepts.   In Progress   Implement self-care strategies during ADL and activity participation to manage pain. In Progress   Demonstrate increased activity tolerance to the level needed for work, home, and community activities, including standing to complete meal preparation, sitting during , attending neighborhood meetings, waiting at transportation hub or in queue for restaurant and/or movies, ability to attend Evangelical, festivals, concerts, as evidenced by increasing to 40 steps in the 2 Minute Step Test.   In Progress   Completes 5x sit to stand test in 22 seconds or less to improve ability to participate in community mobility.    In Progress   Patient will demonstrate improved functional stability for safe completion of self-care ADLs as demonstrated by increased reach on FRT by 2 inches.   In Progress   Pt will demonstrate improved balance and coordination for safety in management of ADLs as demonstrated by decreased time to complete Four Square Step Test by 8 seconds.  In Progress   Demonstrates and self-reports implementation of HEP and aquatic therapy educational components into daily schedule (stretching, mindfulness, breathing, & pacing)  In Progress   Demonstrate implementation of pacing and energy conservation principles through maintenance of JANELL exertion scale rating in daily activity between 3-5.    In Progress      PLAN   This patient is discharged from Occupational Therapy      Padmaja Leonard, OT

## 2023-08-25 ENCOUNTER — LAB VISIT (OUTPATIENT)
Dept: LAB | Facility: HOSPITAL | Age: 58
End: 2023-08-25
Attending: INTERNAL MEDICINE
Payer: MEDICARE

## 2023-08-25 ENCOUNTER — OFFICE VISIT (OUTPATIENT)
Dept: PRIMARY CARE CLINIC | Facility: CLINIC | Age: 58
End: 2023-08-25
Payer: MEDICARE

## 2023-08-25 VITALS
SYSTOLIC BLOOD PRESSURE: 120 MMHG | HEART RATE: 54 BPM | OXYGEN SATURATION: 98 % | WEIGHT: 184.75 LBS | BODY MASS INDEX: 29.69 KG/M2 | DIASTOLIC BLOOD PRESSURE: 80 MMHG | HEIGHT: 66 IN

## 2023-08-25 DIAGNOSIS — T46.6X5A MYALGIA DUE TO STATIN: ICD-10-CM

## 2023-08-25 DIAGNOSIS — M54.50 LOW BACK PAIN RADIATING TO RIGHT LOWER EXTREMITY: ICD-10-CM

## 2023-08-25 DIAGNOSIS — I10 PRIMARY HYPERTENSION: Chronic | ICD-10-CM

## 2023-08-25 DIAGNOSIS — E11.69 HYPERLIPIDEMIA ASSOCIATED WITH TYPE 2 DIABETES MELLITUS: ICD-10-CM

## 2023-08-25 DIAGNOSIS — E11.43 POORLY CONTROLLED TYPE 2 DIABETES MELLITUS WITH GASTROPARESIS: Primary | ICD-10-CM

## 2023-08-25 DIAGNOSIS — Z12.11 SCREENING FOR MALIGNANT NEOPLASM OF COLON: ICD-10-CM

## 2023-08-25 DIAGNOSIS — E78.2 MIXED HYPERLIPIDEMIA: ICD-10-CM

## 2023-08-25 DIAGNOSIS — M79.604 LOW BACK PAIN RADIATING TO RIGHT LOWER EXTREMITY: ICD-10-CM

## 2023-08-25 DIAGNOSIS — E11.65 POORLY CONTROLLED TYPE 2 DIABETES MELLITUS WITH GASTROPARESIS: ICD-10-CM

## 2023-08-25 DIAGNOSIS — Z12.31 ENCOUNTER FOR SCREENING MAMMOGRAM FOR MALIGNANT NEOPLASM OF BREAST: ICD-10-CM

## 2023-08-25 DIAGNOSIS — E11.43 POORLY CONTROLLED TYPE 2 DIABETES MELLITUS WITH GASTROPARESIS: ICD-10-CM

## 2023-08-25 DIAGNOSIS — E78.5 HYPERLIPIDEMIA ASSOCIATED WITH TYPE 2 DIABETES MELLITUS: ICD-10-CM

## 2023-08-25 DIAGNOSIS — E11.43 DIABETIC GASTROPARESIS ASSOCIATED WITH TYPE 2 DIABETES MELLITUS: ICD-10-CM

## 2023-08-25 DIAGNOSIS — E11.65 POORLY CONTROLLED TYPE 2 DIABETES MELLITUS WITH GASTROPARESIS: Primary | ICD-10-CM

## 2023-08-25 DIAGNOSIS — Z00.00 PREVENTATIVE HEALTH CARE: ICD-10-CM

## 2023-08-25 DIAGNOSIS — K31.84 DIABETIC GASTROPARESIS ASSOCIATED WITH TYPE 2 DIABETES MELLITUS: ICD-10-CM

## 2023-08-25 DIAGNOSIS — M79.10 MYALGIA DUE TO STATIN: ICD-10-CM

## 2023-08-25 PROBLEM — K21.9 GASTROESOPHAGEAL REFLUX DISEASE WITHOUT ESOPHAGITIS: Status: RESOLVED | Noted: 2023-08-25 | Resolved: 2023-08-25

## 2023-08-25 PROBLEM — K21.9 GASTROESOPHAGEAL REFLUX DISEASE WITHOUT ESOPHAGITIS: Status: ACTIVE | Noted: 2023-08-25

## 2023-08-25 PROBLEM — R52 PAIN AGGRAVATED BY ACTIVITIES OF DAILY LIVING: Status: RESOLVED | Noted: 2023-06-27 | Resolved: 2023-08-25

## 2023-08-25 LAB
ALBUMIN SERPL BCP-MCNC: 3.4 G/DL (ref 3.5–5.2)
ALBUMIN/CREAT UR: 4.1 UG/MG (ref 0–30)
ALP SERPL-CCNC: 59 U/L (ref 55–135)
ALT SERPL W/O P-5'-P-CCNC: 18 U/L (ref 10–44)
ANION GAP SERPL CALC-SCNC: 10 MMOL/L (ref 8–16)
AST SERPL-CCNC: 15 U/L (ref 10–40)
BACTERIA #/AREA URNS AUTO: ABNORMAL /HPF
BILIRUB SERPL-MCNC: 0.6 MG/DL (ref 0.1–1)
BILIRUB UR QL STRIP: NEGATIVE
BUN SERPL-MCNC: 18 MG/DL (ref 6–20)
CALCIUM SERPL-MCNC: 9.2 MG/DL (ref 8.7–10.5)
CHLORIDE SERPL-SCNC: 103 MMOL/L (ref 95–110)
CHOLEST SERPL-MCNC: 159 MG/DL (ref 120–199)
CHOLEST/HDLC SERPL: 2.1 {RATIO} (ref 2–5)
CLARITY UR REFRACT.AUTO: ABNORMAL
CO2 SERPL-SCNC: 26 MMOL/L (ref 23–29)
COLOR UR AUTO: YELLOW
CREAT SERPL-MCNC: 0.8 MG/DL (ref 0.5–1.4)
CREAT UR-MCNC: 194 MG/DL (ref 15–325)
ERYTHROCYTE [DISTWIDTH] IN BLOOD BY AUTOMATED COUNT: 13.5 % (ref 11.5–14.5)
EST. GFR  (NO RACE VARIABLE): >60 ML/MIN/1.73 M^2
ESTIMATED AVG GLUCOSE: 206 MG/DL (ref 68–131)
GLUCOSE SERPL-MCNC: 171 MG/DL (ref 70–110)
GLUCOSE UR QL STRIP: NEGATIVE
HBA1C MFR BLD: 8.8 % (ref 4–5.6)
HCT VFR BLD AUTO: 41.7 % (ref 37–48.5)
HDLC SERPL-MCNC: 75 MG/DL (ref 40–75)
HDLC SERPL: 47.2 % (ref 20–50)
HGB BLD-MCNC: 13.3 G/DL (ref 12–16)
HGB UR QL STRIP: NEGATIVE
KETONES UR QL STRIP: NEGATIVE
LDLC SERPL CALC-MCNC: 75 MG/DL (ref 63–159)
LEUKOCYTE ESTERASE UR QL STRIP: ABNORMAL
MAGNESIUM SERPL-MCNC: 1.6 MG/DL (ref 1.6–2.6)
MCH RBC QN AUTO: 30.4 PG (ref 27–31)
MCHC RBC AUTO-ENTMCNC: 31.9 G/DL (ref 32–36)
MCV RBC AUTO: 95 FL (ref 82–98)
MICROALBUMIN UR DL<=1MG/L-MCNC: 8 UG/ML
MICROSCOPIC COMMENT: ABNORMAL
NITRITE UR QL STRIP: POSITIVE
NONHDLC SERPL-MCNC: 84 MG/DL
PH UR STRIP: 6 [PH] (ref 5–8)
PLATELET # BLD AUTO: 338 K/UL (ref 150–450)
PMV BLD AUTO: 11.2 FL (ref 9.2–12.9)
POTASSIUM SERPL-SCNC: 4.2 MMOL/L (ref 3.5–5.1)
PROT SERPL-MCNC: 6.7 G/DL (ref 6–8.4)
PROT UR QL STRIP: NEGATIVE
RBC # BLD AUTO: 4.38 M/UL (ref 4–5.4)
RBC #/AREA URNS AUTO: 3 /HPF (ref 0–4)
SODIUM SERPL-SCNC: 139 MMOL/L (ref 136–145)
SP GR UR STRIP: 1.02 (ref 1–1.03)
SQUAMOUS #/AREA URNS AUTO: 1 /HPF
TRIGL SERPL-MCNC: 45 MG/DL (ref 30–150)
URN SPEC COLLECT METH UR: ABNORMAL
WBC # BLD AUTO: 9.35 K/UL (ref 3.9–12.7)
WBC #/AREA URNS AUTO: 11 /HPF (ref 0–5)

## 2023-08-25 PROCEDURE — 3061F NEG MICROALBUMINURIA REV: CPT | Mod: CPTII,S$GLB,, | Performed by: INTERNAL MEDICINE

## 2023-08-25 PROCEDURE — 99999 PR PBB SHADOW E&M-EST. PATIENT-LVL V: ICD-10-PCS | Mod: PBBFAC,,, | Performed by: INTERNAL MEDICINE

## 2023-08-25 PROCEDURE — 3008F PR BODY MASS INDEX (BMI) DOCUMENTED: ICD-10-PCS | Mod: CPTII,S$GLB,, | Performed by: INTERNAL MEDICINE

## 2023-08-25 PROCEDURE — 80061 LIPID PANEL: CPT | Performed by: INTERNAL MEDICINE

## 2023-08-25 PROCEDURE — 3052F PR MOST RECENT HEMOGLOBIN A1C LEVEL 8.0 - < 9.0%: ICD-10-PCS | Mod: CPTII,S$GLB,, | Performed by: INTERNAL MEDICINE

## 2023-08-25 PROCEDURE — 83036 HEMOGLOBIN GLYCOSYLATED A1C: CPT | Performed by: INTERNAL MEDICINE

## 2023-08-25 PROCEDURE — 4010F ACE/ARB THERAPY RXD/TAKEN: CPT | Mod: CPTII,S$GLB,, | Performed by: INTERNAL MEDICINE

## 2023-08-25 PROCEDURE — 90677 PCV20 VACCINE IM: CPT | Mod: S$GLB,,, | Performed by: INTERNAL MEDICINE

## 2023-08-25 PROCEDURE — 99215 OFFICE O/P EST HI 40 MIN: CPT | Mod: 25,S$GLB,, | Performed by: INTERNAL MEDICINE

## 2023-08-25 PROCEDURE — 4010F PR ACE/ARB THEARPY RXD/TAKEN: ICD-10-PCS | Mod: CPTII,S$GLB,, | Performed by: INTERNAL MEDICINE

## 2023-08-25 PROCEDURE — 3052F HG A1C>EQUAL 8.0%<EQUAL 9.0%: CPT | Mod: CPTII,S$GLB,, | Performed by: INTERNAL MEDICINE

## 2023-08-25 PROCEDURE — 99999 PR PBB SHADOW E&M-EST. PATIENT-LVL V: CPT | Mod: PBBFAC,,, | Performed by: INTERNAL MEDICINE

## 2023-08-25 PROCEDURE — 3074F PR MOST RECENT SYSTOLIC BLOOD PRESSURE < 130 MM HG: ICD-10-PCS | Mod: CPTII,S$GLB,, | Performed by: INTERNAL MEDICINE

## 2023-08-25 PROCEDURE — 3079F PR MOST RECENT DIASTOLIC BLOOD PRESSURE 80-89 MM HG: ICD-10-PCS | Mod: CPTII,S$GLB,, | Performed by: INTERNAL MEDICINE

## 2023-08-25 PROCEDURE — 3074F SYST BP LT 130 MM HG: CPT | Mod: CPTII,S$GLB,, | Performed by: INTERNAL MEDICINE

## 2023-08-25 PROCEDURE — 1159F PR MEDICATION LIST DOCUMENTED IN MEDICAL RECORD: ICD-10-PCS | Mod: CPTII,S$GLB,, | Performed by: INTERNAL MEDICINE

## 2023-08-25 PROCEDURE — 3066F NEPHROPATHY DOC TX: CPT | Mod: CPTII,S$GLB,, | Performed by: INTERNAL MEDICINE

## 2023-08-25 PROCEDURE — 1159F MED LIST DOCD IN RCRD: CPT | Mod: CPTII,S$GLB,, | Performed by: INTERNAL MEDICINE

## 2023-08-25 PROCEDURE — 36415 COLL VENOUS BLD VENIPUNCTURE: CPT | Mod: PN | Performed by: INTERNAL MEDICINE

## 2023-08-25 PROCEDURE — 80053 COMPREHEN METABOLIC PANEL: CPT | Performed by: INTERNAL MEDICINE

## 2023-08-25 PROCEDURE — 85027 COMPLETE CBC AUTOMATED: CPT | Performed by: INTERNAL MEDICINE

## 2023-08-25 PROCEDURE — 3079F DIAST BP 80-89 MM HG: CPT | Mod: CPTII,S$GLB,, | Performed by: INTERNAL MEDICINE

## 2023-08-25 PROCEDURE — 3061F PR NEG MICROALBUMINURIA RESULT DOCUMENTED/REVIEW: ICD-10-PCS | Mod: CPTII,S$GLB,, | Performed by: INTERNAL MEDICINE

## 2023-08-25 PROCEDURE — 82570 ASSAY OF URINE CREATININE: CPT | Performed by: INTERNAL MEDICINE

## 2023-08-25 PROCEDURE — 81001 URINALYSIS AUTO W/SCOPE: CPT | Performed by: INTERNAL MEDICINE

## 2023-08-25 PROCEDURE — 99417 PR PROLONGED SVC, OUTPT, W/WO DIRECT PT CONTACT,  EA ADDTL 15 MIN: ICD-10-PCS | Mod: S$GLB,,, | Performed by: INTERNAL MEDICINE

## 2023-08-25 PROCEDURE — 90677 PNEUMOCOCCAL CONJUGATE VACCINE 20-VALENT: ICD-10-PCS | Mod: S$GLB,,, | Performed by: INTERNAL MEDICINE

## 2023-08-25 PROCEDURE — G0009 PNEUMOCOCCAL CONJUGATE VACCINE 20-VALENT: ICD-10-PCS | Mod: S$GLB,,, | Performed by: INTERNAL MEDICINE

## 2023-08-25 PROCEDURE — 99215 PR OFFICE/OUTPT VISIT, EST, LEVL V, 40-54 MIN: ICD-10-PCS | Mod: 25,S$GLB,, | Performed by: INTERNAL MEDICINE

## 2023-08-25 PROCEDURE — 3066F PR DOCUMENTATION OF TREATMENT FOR NEPHROPATHY: ICD-10-PCS | Mod: CPTII,S$GLB,, | Performed by: INTERNAL MEDICINE

## 2023-08-25 PROCEDURE — 99417 PROLNG OP E/M EACH 15 MIN: CPT | Mod: S$GLB,,, | Performed by: INTERNAL MEDICINE

## 2023-08-25 PROCEDURE — 3008F BODY MASS INDEX DOCD: CPT | Mod: CPTII,S$GLB,, | Performed by: INTERNAL MEDICINE

## 2023-08-25 PROCEDURE — 83735 ASSAY OF MAGNESIUM: CPT | Performed by: INTERNAL MEDICINE

## 2023-08-25 PROCEDURE — G0009 ADMIN PNEUMOCOCCAL VACCINE: HCPCS | Mod: S$GLB,,, | Performed by: INTERNAL MEDICINE

## 2023-08-25 RX ORDER — DICYCLOMINE HYDROCHLORIDE 10 MG/1
10 CAPSULE ORAL
Qty: 120 CAPSULE | Refills: 3 | Status: SHIPPED | OUTPATIENT
Start: 2023-08-25 | End: 2023-11-23

## 2023-08-25 RX ORDER — GABAPENTIN 100 MG/1
CAPSULE ORAL
Qty: 180 CAPSULE | Refills: 3 | Status: SHIPPED | OUTPATIENT
Start: 2023-08-25 | End: 2023-12-07

## 2023-08-25 RX ORDER — SEMAGLUTIDE 0.68 MG/ML
0.5 INJECTION, SOLUTION SUBCUTANEOUS
Qty: 3 ML | Refills: 5 | Status: SHIPPED | OUTPATIENT
Start: 2023-08-25 | End: 2023-12-07

## 2023-08-25 RX ORDER — EZETIMIBE 10 MG/1
10 TABLET ORAL DAILY
Qty: 90 TABLET | Refills: 3 | Status: SHIPPED | OUTPATIENT
Start: 2023-08-25 | End: 2024-08-24

## 2023-08-25 RX ORDER — PEN NEEDLE, DIABETIC 30 GX3/16"
1 NEEDLE, DISPOSABLE MISCELLANEOUS DAILY
Qty: 100 EACH | Refills: 3 | Status: SHIPPED | OUTPATIENT
Start: 2023-08-25

## 2023-08-25 NOTE — ASSESSMENT & PLAN NOTE
-change from Protonix 40mg daily due to >4 year use and change to Pepcid 40mg daily  Start Bentyl 10mg po qid   Start Prilosec 20mg daily or Zantac 75mg /Pepcid 20mg over the counter 30 min prior to breakfast x 4-6 weeks.  Initiate GERD precautions ie lose weight, avoid eating 3 hours prior to bed, minimize caffeine, alcohol, tobacco, spicy, greasy, fatty foods; avoid peppermint chocolates, citrus and other acidic foods. Increase exercise to help with digestion and avoid lying down immediately after eating.  Elevate head of bed if GERD awakens pt from sleep.  Eat 6 small snacks rather than 3 large meals.   IIf the above are not effective, will obtain CXR and/or refer to ENT for further evaluation.

## 2023-08-25 NOTE — ASSESSMENT & PLAN NOTE
Start zetia 10mg daily and Livalo 4mg po daily given statin-induced myalgias  Check FLP now off of statin

## 2023-08-25 NOTE — PATIENT INSTRUCTIONS
Get COVID, FLU, RSV, Shingrex vacines, prevnar 20   Bentyl 4 times per day  Ozempic 0.5mg weekly  Labs today  Zetia 10mg daily for cholesterol  Gabapentin 100-200mg daily     Check on cholesterol med which causes muscle ache    Refer to colon, GI, gyn, podiatry     -Check Bps at home weekly and prn symptoms for goal BP <130/80.  -Start healthy diet high in fiber (25-35 gm daily), low fat dairy, lean protein, low in saturated/trans fat (minimize cheese, creamy salad dressings); high in calcium/magnesium/potassium; 2.0 gm sodium diet; low in processed sugars and foods, ie  WHITE sugars, bread, pasta, rice, ice cream, cookies, cake, doughnuts  -Drink 6-8 glasses of water daily  -Moderate exercise 30 min 5 times per week or 75 min intense exercise biweekly   -Start 8-10 hour intermittent fasting diet   -Avoid eating 3 hours prior to bedtime  -Reduce alcohol to 1-2 glasses per day  -Avoid smoking, vaping, stimulant drugs/mediations ie illicit drugs, pseudo-ephedrine  -Use ADD/ADHD meds sparingly  -Minimize NSAIDs    RTC in 3 months

## 2023-08-25 NOTE — ASSESSMENT & PLAN NOTE
Get COVID, FLU,, Shingrex vacines  Prevnar 20 today  Bentyl 4 times per day  Ozempic 0.5mg weekly  Labs today  Zetia 10mg daily for cholesterol  Livalo 4mg daily daily   Gabapentin 100-200mg daily   Try to order Freestyle    Check on cholesterol med which causes muscle ache    Refer to colon, GI, gyn, podiatry   Schedule MGT at [facility]    RTC in 3 months

## 2023-08-25 NOTE — PROGRESS NOTES
[facility]/Pediatric[facility] Progress Note      Chief Complaint     Annual Exam and Establish Care      Subjective:      History of Present Illness:  Aisha Muñoz is a 58 y.o. female  [facility] [address]  -last labs 1 year ago     DM with gastroparesis x 12 years:  takes Actos 45mg daily; was taking Victoza 1.2mg daily and prefers Ozempic 0.25mg SQ weekly but out x 3 wks; lost 40 lbs with help of Ozempic which she has taken x 2 mo  Labs HbA1C approx 10-11 as per pt.  -denies numbness and tingling of bilateral hands and feet but rather c/o radiating numbness due to right sciatica  -stopped gabapentin 300mg since too sedating   Glucose 175 usually 200s   -desires Freestyle   DM with gastroparesis: protonix 40mg daily for >5 years; vomits sometimes due to early satiety and feels like the food stays in her stomach  -pt desires EGD danica with strong maternal FH stomach cancer    Chronic pain LBP L4-S1 with numbness down right leg: sees pain mgt with Dr. Cano with Percocet tid; lido 5% daily   -hx fusion L5-S1    HTN: well-controlled on Lisinopril 5mg po daily    Anaphylaxis: uses epi-pen prn for all fruit, fish, lyrica     Tobacco Use: stopped 13 years ago; total smoking 18 years     SH: no drinking, drugs, alcohol; 9 children; works at airport as a    FH: stomach cancer -mom, MGM, maternal aunt all  from it 56 y/o, 50s.       Past Medical History:  Past Medical History:   Diagnosis Date    Acute midline low back pain with right-sided sciatica 2023    Arthritis     Brain tumor (benign)     Chronic back pain     Diabetes mellitus     Gastroesophageal reflux disease without esophagitis 2023    Hypertension     Pain aggravated by activities of daily living 2023       Past Surgical History:  Past Surgical History:   Procedure Laterality Date    BACK SURGERY      no hardware  fusion    BRAIN SURGERY      benign tumor removed from brain    carpal tunnel release   "    rosa m hands    HYSTERECTOMY         Allergies:  Review of patient's allergies indicates:   Allergen Reactions    Fish containing products Shortness Of Breath, Swelling and Rash    Grape Itching    Strawberries [strawberry] Itching    Banana Itching    Lyrica [pregabalin] Other (See Comments)     headache       Home Medications:  Current Outpatient Medications   Medication Sig Dispense Refill    acetaminophen (TYLENOL) 325 MG tablet Take 325 mg by mouth as needed for Pain.      EPINEPHrine (EPIPEN) 0.3 mg/0.3 mL AtIn Inject into the muscle.      LIDOcaine (LIDODERM) 5 % SMARTSI-2 Patch(s) Topical Daily PRN      lisinopril (PRINIVIL,ZESTRIL) 5 MG tablet Take 5 mg by mouth once daily.      oxyCODONE-acetaminophen (PERCOCET)  mg per tablet 1 Tablet Three times a Day PRN pain. More than a 7 day supply of opioid medication is medically necessary. Do not fill until 2023. 90 tablet 0    pantoprazole (PROTONIX) 40 MG tablet Take 80 mg by mouth once daily.       blood-glucose sensor (FREESTYLE DAYANA 3 SENSOR) Cheryl 1 Device by Misc.(Non-Drug; Combo Route) route continuous. 1 each 0    dicyclomine (BENTYL) 10 MG capsule Take 1 capsule (10 mg total) by mouth 4 (four) times daily before meals and nightly. 120 capsule 3    ezetimibe (ZETIA) 10 mg tablet Take 1 tablet (10 mg total) by mouth once daily. 90 tablet 3    gabapentin (NEURONTIN) 100 MG capsule Take 1-2 po qhs 180 capsule 3    pen needle, diabetic (BD KARINA 2ND GEN PEN NEEDLE) 32 gauge x 5/32" Ndle 1 Dose by Misc.(Non-Drug; Combo Route) route once daily. 100 each 3    pitavastatin calcium (LIVALO) 4 mg Tab Take 4 mg by mouth once daily. 90 tablet 3    semaglutide (OZEMPIC) 0.25 mg or 0.5 mg (2 mg/3 mL) pen injector Inject 0.5 mg into the skin every 7 days. 3 mL 5     No current facility-administered medications for this visit.        Family History:  No family history on file.    Social History:  Social History     Tobacco Use    Smoking status: Former     " "Current packs/day: 0.00     Types: Cigarettes     Quit date: 2013     Years since quittin.8    Smokeless tobacco: Never   Substance Use Topics    Alcohol use: No    Drug use: No       Review of Systems:  Pertinent positives and negatives listed in HPI. All other systems are reviewed and are negative.    Health Maintaince :   Health Maintenance Topics with due status: Not Due       Topic Last Completion Date    TETANUS VACCINE 2019    Lipid Panel 2023    Influenza Vaccine Not Due           Eye: UTD [facility] [address]   Dental: surgery to remove all teeth - needs new dentrues    Immunizations:   Tdap: n/a.  Influenza: needs.  Pneumovax: needs   Shingrex x 2: needs  COVID: needs  Hepatitis C:   Cancer Screening:  PAP: needs  Mammogram: needs [facility] [place]   Colonoscopy: needs   DEXA:  n/a  LDCT: n/a    The 10-year ASCVD risk score (Sara BROWN, et al., 2019) is: 3.2%    Values used to calculate the score:      Age: 58 years      Sex: Female      Is Non- : Yes      Diabetic: No      Tobacco smoker: No      Systolic Blood Pressure: 120 mmHg      Is BP treated: Yes      HDL Cholesterol: 75 mg/dL      Total Cholesterol: 159 mg/dL      Objective:   /80 (BP Location: Left arm, Patient Position: Sitting, BP Method: Medium (Manual))   Pulse (!) 54   Ht 5' 6" (1.676 m)   Wt 83.8 kg (184 lb 11.9 oz)   SpO2 98%   BMI 29.82 kg/m²      Body mass index is 29.82 kg/m².       Physical Examination:  General: Alert and awake in no apparent distress  HEENT: Normocephalic and atraumatic; Tms WNL  Eyes:  PERRL; EOMi with anicteric sclera and clear conjunctivae  Mouth:  Oropharynx clear and without exudate; moist mucous membranes  Neck:   Cervical nodes not enlarged; supple; no bruits  Cardio:  Regular rate and rhythm with normal S1 and S2; no murmurs or rubs  Resp:  CTAB; respirations unlabored; no wheezes, crackles or rhonchi  Abdom: Soft, NTND with normoactive bowel sounds; negative " HSM  Back:               neg cross and passive leg raise; no vertebral tenderness;  Extrem: Warm and well-perfused with no clubbing, cyanosis or edema  Skin:  No rashes, lesions, or color changes  Pulses:  2+ and symmetric distally  Neuro:  AAOx3; cooperative and pleasant with no focal deficits    Laboratory:      Most Recent Data:  Lab Results   Component Value Date    WBC 9.35 08/25/2023    HGB 13.3 08/25/2023    HCT 41.7 08/25/2023     08/25/2023    CHOL 159 08/25/2023    TRIG 45 08/25/2023    HDL 75 08/25/2023    ALT 18 08/25/2023    AST 15 08/25/2023     08/25/2023    K 4.2 08/25/2023     08/25/2023    BUN 18 08/25/2023    CO2 26 08/25/2023    TSH 2.7 08/24/2006    HGBA1C 8.8 (H) 08/25/2023              CBC:   WBC   Date Value Ref Range Status   08/25/2023 9.35 3.90 - 12.70 K/uL Final     Hemoglobin   Date Value Ref Range Status   08/25/2023 13.3 12.0 - 16.0 g/dL Final     Hematocrit   Date Value Ref Range Status   08/25/2023 41.7 37.0 - 48.5 % Final     Platelets   Date Value Ref Range Status   08/25/2023 338 150 - 450 K/uL Final     MCV   Date Value Ref Range Status   08/25/2023 95 82 - 98 fL Final     RDW   Date Value Ref Range Status   08/25/2023 13.5 11.5 - 14.5 % Final     BMP:   Sodium   Date Value Ref Range Status   08/25/2023 139 136 - 145 mmol/L Final     Potassium   Date Value Ref Range Status   08/25/2023 4.2 3.5 - 5.1 mmol/L Final     Chloride   Date Value Ref Range Status   08/25/2023 103 95 - 110 mmol/L Final     CO2   Date Value Ref Range Status   08/25/2023 26 23 - 29 mmol/L Final     BUN   Date Value Ref Range Status   08/25/2023 18 6 - 20 mg/dL Final     Creatinine   Date Value Ref Range Status   08/25/2023 0.8 0.5 - 1.4 mg/dL Final     Glucose   Date Value Ref Range Status   08/25/2023 171 (H) 70 - 110 mg/dL Final     Calcium   Date Value Ref Range Status   08/25/2023 9.2 8.7 - 10.5 mg/dL Final     Magnesium   Date Value Ref Range Status   08/25/2023 1.6 1.6 - 2.6 mg/dL  "Final     LFTs:   Total Protein   Date Value Ref Range Status   08/25/2023 6.7 6.0 - 8.4 g/dL Final     Albumin   Date Value Ref Range Status   08/25/2023 3.4 (L) 3.5 - 5.2 g/dL Final     Total Bilirubin   Date Value Ref Range Status   08/25/2023 0.6 0.1 - 1.0 mg/dL Final     Comment:     For infants and newborns, interpretation of results should be based  on gestational age, weight and in agreement with clinical  observations.    Premature Infant recommended reference ranges:  Up to 24 hours.............<8.0 mg/dL  Up to 48 hours............<12.0 mg/dL  3-5 days..................<15.0 mg/dL  6-29 days.................<15.0 mg/dL       AST   Date Value Ref Range Status   08/25/2023 15 10 - 40 U/L Final     Alkaline Phosphatase   Date Value Ref Range Status   08/25/2023 59 55 - 135 U/L Final     ALT   Date Value Ref Range Status   08/25/2023 18 10 - 44 U/L Final     Coags: No results found for: "INR", "PROTIME", "PTT"  FLP:      Lab Results   Component Value Date    CHOL 159 08/25/2023    CHOL 129 08/24/2006     Lab Results   Component Value Date    HDL 75 08/25/2023    HDL 30.0 (L) 08/24/2006     Lab Results   Component Value Date    LDLCALC 75.0 08/25/2023    LDLCALC 65.4 08/24/2006     Lab Results   Component Value Date    TRIG 45 08/25/2023    TRIG 168 (H) 08/24/2006     Lab Results   Component Value Date    CHOLHDL 47.2 08/25/2023    CHOLHDL 23.3 08/24/2006      DM:      Hemoglobin A1C   Date Value Ref Range Status   08/25/2023 8.8 (H) 4.0 - 5.6 % Final     Comment:     ADA Screening Guidelines:  5.7-6.4%  Consistent with prediabetes  >or=6.5%  Consistent with diabetes    High levels of fetal hemoglobin interfere with the HbA1C  assay. Heterozygous hemoglobin variants (HbS, HgC, etc)do  not significantly interfere with this assay.   However, presence of multiple variants may affect accuracy.       LDL Cholesterol   Date Value Ref Range Status   08/25/2023 75.0 63.0 - 159.0 mg/dL Final     Comment:     The " "National Cholesterol Education Program (NCEP) has set the  following guidelines (reference values) for LDL Cholesterol:  Optimal.......................<130 mg/dL  Borderline High...............130-159 mg/dL  High..........................160-189 mg/dL  Very High.....................>190 mg/dL       Creatinine   Date Value Ref Range Status   08/25/2023 0.8 0.5 - 1.4 mg/dL Final     Thyroid:   TSH   Date Value Ref Range Status   08/24/2006 2.7 0.4 - 4.0 uIU/ml Final     Anemia: No results found for: "IRON", "TIBC", "FERRITIN", "[id number]", "FOLATE"  Cardiac:   Troponin I   Date Value Ref Range Status   05/26/2017 <0.006 0.000 - 0.026 ng/mL Final     Comment:     The reference interval for Troponin I represents the 99th percentile   cutoff   for our facility and is consistent with 3rd generation assay   performance.       BNP   Date Value Ref Range Status   05/26/2017 <10 0 - 99 pg/mL Final     Comment:     Values of less than 100 pg/ml are consistent with non-CHF populations.     Urinalysis:   Urine Culture, Routine   Date Value Ref Range Status   04/23/2007   Final    >100,000 ORGANISMS/ML -ESCHERICHIA COLI  Amikacin             <=16       SENSITIVE     Amox/K Clav          <=8/4      SENSITIVE     Ceftriaxone          <=8        SENSITIVE     Cefazolin            <=8        SENSITIVE     Ciprofloxacin        <=1        SENSITIVE     Nitrofurantoin       <=32       SENSITIVE     Gentamicin           <=4        SENSITIVE     Bactrim              <=2/38     SENSITIVE     Tetracycline         <=4        SENSITIVE     Timentin             <=16       SENSITIVE     Tobramycin           <=4        SENSITIVE          Color, UA   Date Value Ref Range Status   08/25/2023 Yellow Yellow, Straw, Nelly Final     Specific Gravity, UA   Date Value Ref Range Status   08/25/2023 1.020 1.005 - 1.030 Final     Nitrite, UA   Date Value Ref Range Status   08/25/2023 Positive (A) Negative Final     Ketones, UA   Date Value Ref Range " "Status   08/25/2023 Negative Negative Final     Urobilinogen, UA   Date Value Ref Range Status   10/23/2017 1.0 <2.0 EU/dL Final     WBC, UA   Date Value Ref Range Status   08/25/2023 11 (H) 0 - 5 /hpf Final       Other Results:  EKG (my interpretation):     Radiology:  X-Ray Lumbar Spine Ap Lateral w/Flex Ext  Narrative: EXAMINATION:  XR LUMBAR SPINE AP AND LAT WITH FLEX/EXT    CLINICAL HISTORY:  Low back pain, unspecified    TECHNIQUE:  AP and lateral views as well as lateral flexion and extension images are performed through the lumbar spine.    COMPARISON:  None    FINDINGS:  Vertebral bodies are intact.  Patient has had previous effusion at the L5-S1 level alignment is satisfactory.  No instability is identified.  Impression: See above    Electronically signed by: Cabrera Zapata MD  Date:    06/01/2023  Time:    10:21          Assessment/Plan     Aisha Muñoz is a 58 y.o. female with:  1. Poorly controlled type 2 diabetes mellitus with gastroparesis  -     Magnesium; Future; Expected date: 08/25/2023  -     Hemoglobin A1C; Future; Expected date: 08/25/2023  -     Microalbumin/Creatinine Ratio, Urine; Future; Expected date: 08/25/2023  -     Urinalysis; Future; Expected date: 08/25/2023  -     Comprehensive Metabolic Panel; Future; Expected date: 08/25/2023  -     CBC Without Differential; Future; Expected date: 08/25/2023  -     Lipid Panel; Future; Expected date: 08/25/2023  -     semaglutide (OZEMPIC) 0.25 mg or 0.5 mg (2 mg/3 mL) pen injector; Inject 0.5 mg into the skin every 7 days.  Dispense: 3 mL; Refill: 5  -     Ambulatory referral/consult to Podiatry; Future; Expected date: 09/01/2023  -     gabapentin (NEURONTIN) 100 MG capsule; Take 1-2 po qhs  Dispense: 180 capsule; Refill: 3  -     pen needle, diabetic (BD KARINA 2ND GEN PEN NEEDLE) 32 gauge x 5/32" Ndle; 1 Dose by Misc.(Non-Drug; Combo Route) route once daily.  Dispense: 100 each; Refill: 3  -     Cancel: (In Office Administered) " Pneumococcal Conjugate Vaccine (20 Valent) (IM)  -     (In Office Administered) Pneumococcal Conjugate Vaccine (20 Valent) (IM); Future; Expected date: 08/25/2023  -     Cancel: Urinalysis; Future; Expected date: 08/25/2023  -     blood-glucose sensor (FREESTYLE DAYANA 3 SENSOR) Cheryl; 1 Device by Misc.(Non-Drug; Combo Route) route continuous.  Dispense: 1 each; Refill: 0    2. Encounter for screening mammogram for malignant neoplasm of breast  -     Mammo Digital Screening Bilat; Future; Expected date: 08/25/2023    3. Screening for malignant neoplasm of colon  -     Ambulatory referral/consult to Endo Procedure ; Future; Expected date: 08/26/2023    4. Preventative health care  Assessment & Plan:  Get COVID, FLU,, Shingrex vacines  Prevnar 20 today  Bentyl 4 times per day  Ozempic 0.5mg weekly  Labs today  Zetia 10mg daily for cholesterol  Livalo 4mg daily daily   Gabapentin 100-200mg daily   Try to order Freestyle    Check on cholesterol med which causes muscle ache    Refer to colon, GI, gyn, podiatry   Schedule [facility] at [facility] in 3 months    Orders:  -     Ambulatory referral/consult to Gynecology; Future; Expected date: 09/01/2023  -     Cancel: (In Office Administered) Pneumococcal Conjugate Vaccine (20 Valent) (IM)    5. Hyperlipidemia associated with type 2 diabetes mellitus  -     ezetimibe (ZETIA) 10 mg tablet; Take 1 tablet (10 mg total) by mouth once daily.  Dispense: 90 tablet; Refill: 3  -     pitavastatin calcium (LIVALO) 4 mg Tab; Take 4 mg by mouth once daily.  Dispense: 90 tablet; Refill: 3    6. Myalgia due to statin  Assessment & Plan:  Pt to check on which statin caused myalgias  Start Zetia 10mg in the meantime - will order Livalo    Orders:  -     pitavastatin calcium (LIVALO) 4 mg Tab; Take 4 mg by mouth once daily.  Dispense: 90 tablet; Refill: 3    7. Diabetic gastroparesis associated with type 2 diabetes mellitus  Assessment & Plan:  -change from Protonix 40mg daily due to >4  year use and change to Pepcid 40mg daily  Start Bentyl 10mg po qid   Start Prilosec 20mg daily or Zantac 75mg /Pepcid 20mg over the counter 30 min prior to breakfast x 4-6 weeks.  Initiate GERD precautions ie lose weight, avoid eating 3 hours prior to bed, minimize caffeine, alcohol, tobacco, spicy, greasy, fatty foods; avoid peppermint chocolates, citrus and other acidic foods. Increase exercise to help with digestion and avoid lying down immediately after eating.  Elevate head of bed if GERD awakens pt from sleep.  Eat 6 small snacks rather than 3 large meals.   IIf the above are not effective, will obtain CXR and/or refer to ENT for further evaluation.         Orders:  -     Ambulatory referral/consult to Gastroenterology; Future; Expected date: 09/01/2023  -     dicyclomine (BENTYL) 10 MG capsule; Take 1 capsule (10 mg total) by mouth 4 (four) times daily before meals and nightly.  Dispense: 120 capsule; Refill: 3    8. Mixed hyperlipidemia  Assessment & Plan:  Start zetia 10mg daily and Livalo 4mg po daily given statin-induced myalgias  Check FLP now off of statin      9. Low back pain radiating to right lower extremity  Overview:  MRI LS without contrast 5/2020:   Changes of multilevel degenerative facet disease as described.   There is no evidence of high-grade spinal stenosis or direct nerve   root impingement.   2. Changes of previous instrumented fusion at L5-S1.       Assessment & Plan:  Cont pain mgt with percocet. lidoderm 5% patch  Start Gabapentin 100-200mg po qhs  F/u Dr. Snow (pain mgt)        10. Primary hypertension  Assessment & Plan:  Well-controlled on Lisinopril 5mg po daily with goal <130/80  -Check Bps at home weekly and prn symptoms for goal BP <130/80.  -Start healthy diet high in fiber (25-35 gm daily), low fat dairy, lean protein, low in saturated/trans fat (minimize cheese, creamy salad dressings); high in calcium/magnesium/potassium; 2.0 gm sodium diet; low in processed sugars and  foods, ie  WHITE sugars, bread, pasta, rice, ice cream, cookies, cake, doughnuts  -Drink 6-8 glasses of water daily  -Moderate exercise 30 min 5 times per week or 75 min intense exercise biweekly   -Start 8-10 hour intermittent fasting diet   -Avoid eating 3 hours prior to bedtime  -Reduce alcohol to 1-2 glasses per day  -Avoid smoking, vaping, stimulant drugs/mediations ie illicit drugs, pseudo-ephedrine  -Use ADD/ADHD meds sparingly  -Minimize NSAIDs                     I spent a total of 73 minutes on the day of the visit.This includes face to face time and non-face to face time preparing to see the patient (eg, review of tests), obtaining and/or reviewing separately obtained history, documenting clinical information in the electronic or other health record, independently interpreting results and communicating results to the patient/family/caregiver, or care coordinator.   Code Status:     Tatianna Paulino MD

## 2023-08-26 PROBLEM — T78.03XA ANAPHYLAXIS DUE TO FISH: Status: ACTIVE | Noted: 2023-08-26

## 2023-08-26 PROBLEM — M54.41 ACUTE MIDLINE LOW BACK PAIN WITH RIGHT-SIDED SCIATICA: Chronic | Status: RESOLVED | Noted: 2023-06-07 | Resolved: 2023-08-26

## 2023-08-26 PROBLEM — M54.50 LOW BACK PAIN RADIATING TO RIGHT LOWER EXTREMITY: Status: ACTIVE | Noted: 2023-08-26

## 2023-08-26 PROBLEM — M79.604 LOW BACK PAIN RADIATING TO RIGHT LOWER EXTREMITY: Status: ACTIVE | Noted: 2023-08-26

## 2023-08-26 RX ORDER — BLOOD-GLUCOSE SENSOR
1 EACH MISCELLANEOUS CONTINUOUS
Qty: 1 EACH | Refills: 0 | Status: SHIPPED | OUTPATIENT
Start: 2023-08-26 | End: 2024-08-25

## 2023-08-26 RX ORDER — PITAVASTATIN CALCIUM 4.18 MG/1
4 TABLET, FILM COATED ORAL DAILY
Qty: 90 TABLET | Refills: 3 | Status: SHIPPED | OUTPATIENT
Start: 2023-08-26 | End: 2024-04-01 | Stop reason: SDUPTHER

## 2023-08-26 NOTE — ASSESSMENT & PLAN NOTE
Refill Ozempic 0.5mg SQ weekly  Cont Actos 45 mg daily  Order Freestyle  Avoid simple sugars/carbs ie white rice/pasta/potatoes/bread; more complex carbohydrates (oatmeal, quinoa, peas, brown rice whole wheat, barley, corn,lentils, sweet potatoes, chickpeas); avoid fast foods, fried, fatty foods; increased protein; no sweetened liquids; eat natural fruit, drink 6-8 glasses of water daily.

## 2023-08-26 NOTE — ASSESSMENT & PLAN NOTE
Cont pain mgt with percocet. lidoderm 5% patch  Start Gabapentin 100-200mg po qhs  F/u Dr. Snow (pain mgt)

## 2023-08-26 NOTE — ASSESSMENT & PLAN NOTE
Well-controlled on Lisinopril 5mg po daily with goal <130/80  -Check Bps at home weekly and prn symptoms for goal BP <130/80.  -Start healthy diet high in fiber (25-35 gm daily), low fat dairy, lean protein, low in saturated/trans fat (minimize cheese, creamy salad dressings); high in calcium/magnesium/potassium; 2.0 gm sodium diet; low in processed sugars and foods, ie  WHITE sugars, bread, pasta, rice, ice cream, cookies, cake, doughnuts  -Drink 6-8 glasses of water daily  -Moderate exercise 30 min 5 times per week or 75 min intense exercise biweekly   -Start 8-10 hour intermittent fasting diet   -Avoid eating 3 hours prior to bedtime  -Reduce alcohol to 1-2 glasses per day  -Avoid smoking, vaping, stimulant drugs/mediations ie illicit drugs, pseudo-ephedrine  -Use ADD/ADHD meds sparingly  -Minimize NSAIDs

## 2023-08-28 ENCOUNTER — TELEPHONE (OUTPATIENT)
Dept: PRIMARY CARE CLINIC | Facility: CLINIC | Age: 58
End: 2023-08-28
Payer: MEDICARE

## 2023-09-28 ENCOUNTER — HOSPITAL ENCOUNTER (OUTPATIENT)
Dept: RADIOLOGY | Facility: HOSPITAL | Age: 58
Discharge: HOME | End: 2023-09-28
Attending: INTERNAL MEDICINE
Payer: MEDICARE

## 2023-09-28 VITALS — HEIGHT: 66 IN | WEIGHT: 184 LBS | BODY MASS INDEX: 29.57 KG/M2

## 2023-09-28 DIAGNOSIS — Z12.31 ENCOUNTER FOR SCREENING MAMMOGRAM FOR MALIGNANT NEOPLASM OF BREAST: ICD-10-CM

## 2023-09-28 PROCEDURE — 77067 SCR MAMMO BI INCL CAD: CPT | Mod: TC

## 2023-09-28 PROCEDURE — 77067 MAMMO DIGITAL SCREENING BILAT WITH TOMO: ICD-10-PCS | Mod: 26,,, | Performed by: RADIOLOGY

## 2023-09-28 PROCEDURE — 77063 BREAST TOMOSYNTHESIS BI: CPT | Mod: 26,,, | Performed by: RADIOLOGY

## 2023-09-28 PROCEDURE — 77067 SCR MAMMO BI INCL CAD: CPT | Mod: 26,,, | Performed by: RADIOLOGY

## 2023-09-28 PROCEDURE — 77063 MAMMO DIGITAL SCREENING BILAT WITH TOMO: ICD-10-PCS | Mod: 26,,, | Performed by: RADIOLOGY

## 2023-10-18 ENCOUNTER — CLINICAL SUPPORT (OUTPATIENT)
Dept: ENDOSCOPY | Facility: HOSPITAL | Age: 58
End: 2023-10-18
Attending: INTERNAL MEDICINE
Payer: MEDICARE

## 2023-10-18 ENCOUNTER — TELEPHONE (OUTPATIENT)
Dept: ENDOSCOPY | Facility: HOSPITAL | Age: 58
End: 2023-10-18

## 2023-10-18 DIAGNOSIS — Z12.11 SCREENING FOR MALIGNANT NEOPLASM OF COLON: Primary | ICD-10-CM

## 2023-10-18 DIAGNOSIS — Z12.11 SCREENING FOR MALIGNANT NEOPLASM OF COLON: ICD-10-CM

## 2023-10-18 RX ORDER — PIOGLITAZONEHYDROCHLORIDE 45 MG/1
45 TABLET ORAL DAILY
COMMUNITY

## 2023-10-18 NOTE — TELEPHONE ENCOUNTER
PAT call to schedule patient for colonoscopy screening.Pt. unable to schedule at this time and requested a call back to schedule in feb.

## 2023-11-09 ENCOUNTER — TELEPHONE (OUTPATIENT)
Dept: ENDOSCOPY | Facility: HOSPITAL | Age: 58
End: 2023-11-09

## 2023-11-09 ENCOUNTER — CLINICAL SUPPORT (OUTPATIENT)
Dept: ENDOSCOPY | Facility: HOSPITAL | Age: 58
End: 2023-11-09
Attending: INTERNAL MEDICINE
Payer: MEDICARE

## 2023-11-09 ENCOUNTER — PATIENT MESSAGE (OUTPATIENT)
Dept: ENDOSCOPY | Facility: HOSPITAL | Age: 58
End: 2023-11-09

## 2023-11-09 DIAGNOSIS — Z12.11 SCREENING FOR MALIGNANT NEOPLASM OF COLON: ICD-10-CM

## 2023-11-09 NOTE — TELEPHONE ENCOUNTER
Spoke to patient to schedule procedure(s) Colonoscopy       Physician to perform procedure(s) Dr. MARNI Palomares  Date of Procedure (s) 3/7/24  Arrival Time 7:15 AM  Time of Procedure(s) 8:15 AM   Location of Procedure(s) [address]  Type of Rx Prep sent to patient: Suprep  Instructions provided to patient via [facility]    Patient was informed on the following information and verbalized understanding. Screening questionnaire reviewed with patient and complete. If procedure requires anesthesia, a responsible adult needs to be present to accompany the patient home, patient cannot drive after receiving anesthesia. Appointment details are tentative, especially check-in time. Patient will receive a prep-op call 4 days prior to confirm check-in time for procedure. If applicable the patient should contact their pharmacy to verify Rx for procedure prep is ready for pick-up. Patient was advised to call the scheduling department at [phone] if pharmacy states no Rx is available. Patient was advised to call the endoscopy scheduling department if any questions or concerns arise.      [facility] Scheduling Department

## 2023-11-27 PROBLEM — Z00.00 PREVENTATIVE HEALTH CARE: Status: RESOLVED | Noted: 2023-08-25 | Resolved: 2023-11-27

## 2023-12-07 ENCOUNTER — OFFICE VISIT (OUTPATIENT)
Dept: PRIMARY CARE CLINIC | Facility: CLINIC | Age: 58
End: 2023-12-07
Payer: MEDICARE

## 2023-12-07 ENCOUNTER — LAB VISIT (OUTPATIENT)
Dept: LAB | Facility: HOSPITAL | Age: 58
End: 2023-12-07
Attending: INTERNAL MEDICINE
Payer: MEDICARE

## 2023-12-07 VITALS
BODY MASS INDEX: 29.02 KG/M2 | OXYGEN SATURATION: 98 % | WEIGHT: 180.56 LBS | HEIGHT: 66 IN | SYSTOLIC BLOOD PRESSURE: 126 MMHG | HEART RATE: 75 BPM | DIASTOLIC BLOOD PRESSURE: 80 MMHG

## 2023-12-07 DIAGNOSIS — I10 PRIMARY HYPERTENSION: Chronic | ICD-10-CM

## 2023-12-07 DIAGNOSIS — M79.10 MYALGIA DUE TO STATIN: ICD-10-CM

## 2023-12-07 DIAGNOSIS — E11.65 POORLY CONTROLLED TYPE 2 DIABETES MELLITUS WITH GASTROPARESIS: Primary | ICD-10-CM

## 2023-12-07 DIAGNOSIS — E11.43 POORLY CONTROLLED TYPE 2 DIABETES MELLITUS WITH GASTROPARESIS: Primary | ICD-10-CM

## 2023-12-07 DIAGNOSIS — Z29.9 ENCOUNTER FOR PREVENTIVE MEASURE: ICD-10-CM

## 2023-12-07 DIAGNOSIS — T46.6X5A MYALGIA DUE TO STATIN: ICD-10-CM

## 2023-12-07 DIAGNOSIS — E78.2 MIXED HYPERLIPIDEMIA: ICD-10-CM

## 2023-12-07 DIAGNOSIS — G89.29 CHRONIC RIGHT-SIDED LOW BACK PAIN WITH RIGHT-SIDED SCIATICA: ICD-10-CM

## 2023-12-07 DIAGNOSIS — E11.65 POORLY CONTROLLED TYPE 2 DIABETES MELLITUS WITH GASTROPARESIS: ICD-10-CM

## 2023-12-07 DIAGNOSIS — E11.43 POORLY CONTROLLED TYPE 2 DIABETES MELLITUS WITH GASTROPARESIS: ICD-10-CM

## 2023-12-07 DIAGNOSIS — B34.9 ACUTE VIRAL SYNDROME: ICD-10-CM

## 2023-12-07 DIAGNOSIS — M54.41 CHRONIC RIGHT-SIDED LOW BACK PAIN WITH RIGHT-SIDED SCIATICA: ICD-10-CM

## 2023-12-07 PROBLEM — M54.40 CHRONIC RIGHT-SIDED LOW BACK PAIN WITH SCIATICA: Status: ACTIVE | Noted: 2023-08-26

## 2023-12-07 LAB
ANION GAP SERPL CALC-SCNC: 9 MMOL/L (ref 8–16)
BUN SERPL-MCNC: 10 MG/DL (ref 6–20)
CALCIUM SERPL-MCNC: 9.5 MG/DL (ref 8.7–10.5)
CHLORIDE SERPL-SCNC: 105 MMOL/L (ref 95–110)
CO2 SERPL-SCNC: 25 MMOL/L (ref 23–29)
CREAT SERPL-MCNC: 0.8 MG/DL (ref 0.5–1.4)
EST. GFR  (NO RACE VARIABLE): >60 ML/MIN/1.73 M^2
ESTIMATED AVG GLUCOSE: 186 MG/DL (ref 68–131)
GLUCOSE SERPL-MCNC: 178 MG/DL (ref 70–110)
HBA1C MFR BLD: 8.1 % (ref 4–5.6)
POTASSIUM SERPL-SCNC: 4.4 MMOL/L (ref 3.5–5.1)
SODIUM SERPL-SCNC: 139 MMOL/L (ref 136–145)

## 2023-12-07 PROCEDURE — 3079F DIAST BP 80-89 MM HG: CPT | Mod: CPTII,S$GLB,, | Performed by: INTERNAL MEDICINE

## 2023-12-07 PROCEDURE — 99215 PR OFFICE/OUTPT VISIT, EST, LEVL V, 40-54 MIN: ICD-10-PCS | Mod: S$GLB,,, | Performed by: INTERNAL MEDICINE

## 2023-12-07 PROCEDURE — 3008F PR BODY MASS INDEX (BMI) DOCUMENTED: ICD-10-PCS | Mod: CPTII,S$GLB,, | Performed by: INTERNAL MEDICINE

## 2023-12-07 PROCEDURE — 80048 BASIC METABOLIC PNL TOTAL CA: CPT | Performed by: INTERNAL MEDICINE

## 2023-12-07 PROCEDURE — 36415 COLL VENOUS BLD VENIPUNCTURE: CPT | Mod: PN | Performed by: INTERNAL MEDICINE

## 2023-12-07 PROCEDURE — 3061F NEG MICROALBUMINURIA REV: CPT | Mod: CPTII,S$GLB,, | Performed by: INTERNAL MEDICINE

## 2023-12-07 PROCEDURE — 3066F NEPHROPATHY DOC TX: CPT | Mod: CPTII,S$GLB,, | Performed by: INTERNAL MEDICINE

## 2023-12-07 PROCEDURE — 3079F PR MOST RECENT DIASTOLIC BLOOD PRESSURE 80-89 MM HG: ICD-10-PCS | Mod: CPTII,S$GLB,, | Performed by: INTERNAL MEDICINE

## 2023-12-07 PROCEDURE — 1159F PR MEDICATION LIST DOCUMENTED IN MEDICAL RECORD: ICD-10-PCS | Mod: CPTII,S$GLB,, | Performed by: INTERNAL MEDICINE

## 2023-12-07 PROCEDURE — 3061F PR NEG MICROALBUMINURIA RESULT DOCUMENTED/REVIEW: ICD-10-PCS | Mod: CPTII,S$GLB,, | Performed by: INTERNAL MEDICINE

## 2023-12-07 PROCEDURE — 83036 HEMOGLOBIN GLYCOSYLATED A1C: CPT | Performed by: INTERNAL MEDICINE

## 2023-12-07 PROCEDURE — 3052F HG A1C>EQUAL 8.0%<EQUAL 9.0%: CPT | Mod: CPTII,S$GLB,, | Performed by: INTERNAL MEDICINE

## 2023-12-07 PROCEDURE — 3066F PR DOCUMENTATION OF TREATMENT FOR NEPHROPATHY: ICD-10-PCS | Mod: CPTII,S$GLB,, | Performed by: INTERNAL MEDICINE

## 2023-12-07 PROCEDURE — 99999 PR PBB SHADOW E&M-EST. PATIENT-LVL IV: CPT | Mod: PBBFAC,,, | Performed by: INTERNAL MEDICINE

## 2023-12-07 PROCEDURE — 3008F BODY MASS INDEX DOCD: CPT | Mod: CPTII,S$GLB,, | Performed by: INTERNAL MEDICINE

## 2023-12-07 PROCEDURE — 1159F MED LIST DOCD IN RCRD: CPT | Mod: CPTII,S$GLB,, | Performed by: INTERNAL MEDICINE

## 2023-12-07 PROCEDURE — 99999 PR PBB SHADOW E&M-EST. PATIENT-LVL IV: ICD-10-PCS | Mod: PBBFAC,,, | Performed by: INTERNAL MEDICINE

## 2023-12-07 PROCEDURE — 3074F PR MOST RECENT SYSTOLIC BLOOD PRESSURE < 130 MM HG: ICD-10-PCS | Mod: CPTII,S$GLB,, | Performed by: INTERNAL MEDICINE

## 2023-12-07 PROCEDURE — 99215 OFFICE O/P EST HI 40 MIN: CPT | Mod: S$GLB,,, | Performed by: INTERNAL MEDICINE

## 2023-12-07 PROCEDURE — 4010F ACE/ARB THERAPY RXD/TAKEN: CPT | Mod: CPTII,S$GLB,, | Performed by: INTERNAL MEDICINE

## 2023-12-07 PROCEDURE — 3074F SYST BP LT 130 MM HG: CPT | Mod: CPTII,S$GLB,, | Performed by: INTERNAL MEDICINE

## 2023-12-07 PROCEDURE — 3052F PR MOST RECENT HEMOGLOBIN A1C LEVEL 8.0 - < 9.0%: ICD-10-PCS | Mod: CPTII,S$GLB,, | Performed by: INTERNAL MEDICINE

## 2023-12-07 PROCEDURE — 4010F PR ACE/ARB THEARPY RXD/TAKEN: ICD-10-PCS | Mod: CPTII,S$GLB,, | Performed by: INTERNAL MEDICINE

## 2023-12-07 RX ORDER — FAMOTIDINE 40 MG/1
40 TABLET, FILM COATED ORAL DAILY
Qty: 90 TABLET | Refills: 3 | Status: SHIPPED | OUTPATIENT
Start: 2023-12-07 | End: 2024-12-06

## 2023-12-07 RX ORDER — ALBUTEROL SULFATE 90 UG/1
2 AEROSOL, METERED RESPIRATORY (INHALATION) EVERY 4 HOURS PRN
Qty: 18 G | Refills: 1 | Status: SHIPPED | OUTPATIENT
Start: 2023-12-07 | End: 2024-12-06

## 2023-12-07 RX ORDER — GUAIFENESIN AND DEXTROMETHORPHAN HYDROBROMIDE 1200; 60 MG/1; MG/1
1 TABLET, EXTENDED RELEASE ORAL 2 TIMES DAILY PRN
Qty: 20 TABLET | Refills: 3 | Status: SHIPPED | OUTPATIENT
Start: 2023-12-07 | End: 2024-01-16

## 2023-12-07 RX ORDER — FLUTICASONE PROPIONATE 50 MCG
1 SPRAY, SUSPENSION (ML) NASAL 2 TIMES DAILY PRN
Qty: 16 G | Refills: 3 | Status: SHIPPED | OUTPATIENT
Start: 2023-12-07

## 2023-12-07 NOTE — ASSESSMENT & PLAN NOTE
Well-controlled on lisinopril 5 mg p.o. daily  -Check Bps at home weekly and prn symptoms for goal BP <130/80.  -Start healthy diet high in fiber (25-35 gm daily), low fat dairy, lean protein, low in saturated/trans fat (minimize cheese, creamy salad dressings); high in calcium/magnesium/potassium; 1.5 gm sodium diet; low in processed sugars and foods, ie  WHITE sugars, bread, pasta, rice, ice cream, cookies, cake, doughnuts  -Drink 6-8 glasses of water daily  -Moderate exercise 30 min 5 times per week or 75 min intense exercise biweekly   -Start 8-10 hour intermittent fasting diet   -Avoid eating 3 hours prior to bedtime  -Reduce alcohol to 1-2 glasses per day  -Avoid smoking, vaping, stimulant drugs/mediations ie illicit drugs, pseudo-ephedrine  -Minimize NSAIDs

## 2023-12-07 NOTE — PROGRESS NOTES
[facility] Progress Note      Chief Complaint     Follow-up (3 month) and Diabetes       Subjective:      History of Present Illness:  Aisha Muñoz is a 58 y.o. female     Viral syndrome x 3 wks: c/o dry cough, clear rhinorrhea, nasal stuffiness; no longer smokes; cough awakens her from sleep    Diabetic gastroparesis: I never sent in pepcid so pt needs refill     DMII with neuropathy of feet:  needs refill of Ozempic at higher dose; compliant with and needs refill of Actos 45 mg daily  Fasting glucose in a.m. proximally 150s which is improved from the 200s due to decreased appetite in approximate 4 lb weight loss on Ozempic 0.5 mg subQ weekly    Chronic right lumbar radiculopathy:  Patient seen by pain management Dr. Cano, who prescribes her opiates but patient feels that the prescription is not strong enough but she does not want surgical intervention as recommended by Neurosurgery, Dr. Fritz       Past Medical History:  Past Medical History:   Diagnosis Date    Acute midline low back pain with right-sided sciatica 6/7/2023    Arthritis     Brain tumor (benign) 1997    Chronic back pain     Diabetes mellitus     Gastroesophageal reflux disease without esophagitis 8/25/2023    Hypertension     Pain aggravated by activities of daily living 6/27/2023       Past Surgical History:  Past Surgical History:   Procedure Laterality Date    BACK SURGERY      no hardware  fusion    BRAIN SURGERY  1997    benign tumor removed from brain    carpal tunnel release      rosa m hands    HYSTERECTOMY         Allergies:  Review of patient's allergies indicates:   Allergen Reactions    Fish containing products Shortness Of Breath, Swelling and Rash    Grape Itching    Strawberries [strawberry] Itching    Banana Itching    Lyrica [pregabalin] Other (See Comments)     headache       Home Medications:  Current Outpatient Medications   Medication Sig Dispense Refill    acetaminophen (TYLENOL) 325 MG tablet  "Take 325 mg by mouth as needed for Pain.      blood-glucose sensor (FREESTYLE DAYANA 3 SENSOR) Cheryl 1 Device by Misc.(Non-Drug; Combo Route) route continuous. 1 each 0    EPINEPHrine (EPIPEN) 0.3 mg/0.3 mL AtIn Inject into the muscle.      ezetimibe (ZETIA) 10 mg tablet Take 1 tablet (10 mg total) by mouth once daily. 90 tablet 3    LIDOcaine (LIDODERM) 5 % SMARTSI-2 Patch(s) Topical Daily PRN      lisinopril (PRINIVIL,ZESTRIL) 5 MG tablet Take 5 mg by mouth once daily.      oxyCODONE-acetaminophen (PERCOCET)  mg per tablet Take 1 tablet by mouth 3 times a day as needed for pain 90 tablet 0    pen needle, diabetic (BD KARINA 2ND GEN PEN NEEDLE) 32 gauge x 5/32" Ndle 1 Dose by Misc.(Non-Drug; Combo Route) route once daily. 100 each 3    pioglitazone (ACTOS) 45 MG tablet Take 45 mg by mouth once daily.      pitavastatin calcium (LIVALO) 4 mg Tab Take 4 mg by mouth once daily. 90 tablet 3    albuterol (VENTOLIN HFA) 90 mcg/actuation inhaler Inhale 2 puffs into the lungs every 4 (four) hours as needed for Wheezing. Rescue 18 g 1    dextromethorphan-guaiFENesin (MUCINEX DM) 60-1,200 mg per 12 hr tablet Take 1 tablet by mouth 2 (two) times daily as needed (cough). 20 tablet 3    famotidine (PEPCID) 40 MG tablet Take 1 tablet (40 mg total) by mouth once daily. 90 tablet 3    fluticasone propionate (FLONASE) 50 mcg/actuation nasal spray 1 spray (50 mcg total) by Each Nostril route 2 (two) times daily as needed for Rhinitis. 16 g 3    semaglutide (OZEMPIC) 1 mg/dose (2 mg/1.5 mL) PnIj Inject 1 mg into the skin every 7 days. 3 mL 11     No current facility-administered medications for this visit.        Family History:  Family History   Problem Relation Age of Onset    Breast cancer Other        Social History:  Social History     Tobacco Use    Smoking status: Former     Current packs/day: 0.00     Types: Cigarettes     Quit date: 2013     Years since quitting: 10.0    Smokeless tobacco: Never   Substance Use " "Topics    Alcohol use: No    Drug use: No       Review of Systems:  Pertinent positives and negatives listed in HPI. All other systems are reviewed and are negative.    Health Maintaince :   Health Maintenance Topics with due status: Not Due       Topic Last Completion Date    TETANUS VACCINE 05/25/2019    Diabetes Urine Screening 08/25/2023    Lipid Panel 08/25/2023    Mammogram 09/28/2023    Hemoglobin A1c 12/07/2023           Eye:   Dental:     Immunizations: refuses  Hepatitis C:   Cancer Screening:  PAP: keep appt with Dr. Durham in  Jan 23 2024  Mammogram: UTD   Colonoscopy: scheduled 3/7//2024    The 10-year ASCVD risk score (Sara BROWN, et al., 2019) is: 8.6%    Values used to calculate the score:      Age: 58 years      Sex: Female      Is Non- : Yes      Diabetic: Yes      Tobacco smoker: No      Systolic Blood Pressure: 126 mmHg      Is BP treated: Yes      HDL Cholesterol: 75 mg/dL      Total Cholesterol: 159 mg/dL      Objective:   /80 (BP Location: Left arm, Patient Position: Sitting, BP Method: Medium (Manual))   Pulse 75   Ht 5' 6" (1.676 m)   Wt 81.9 kg (180 lb 8.9 oz)   SpO2 98%   BMI 29.14 kg/m²      Body mass index is 29.14 kg/m².       Physical Examination:  General: Alert and awake in no apparent distress  HEENT: Normocephalic and atraumatic; Tms WNL  Eyes:  PERRL; EOMi with anicteric sclera and clear conjunctivae  Mouth:  Oropharynx clear and without exudate; moist mucous membranes  Neck:   Cervical nodes not enlarged; supple; no bruits  Cardio:  Regular rate and rhythm with normal S1 and S2; no murmurs or rubs  Resp:  CTAB; respirations unlabored; no wheezes, crackles or rhonchi; occasional cough with deep inspiration  Abdom: Soft, NTND with normoactive bowel sounds; negative HSM  Extrem: Warm and well-perfused with no clubbing, cyanosis or edema  Skin:  No rashes, lesions, or color changes  Pulses:  2+ and symmetric distally  Neuro:  AAOx3; cooperative and " pleasant with no focal deficits    Laboratory:      Most Recent Data:  Lab Results   Component Value Date    WBC 9.35 08/25/2023    HGB 13.3 08/25/2023    HCT 41.7 08/25/2023     08/25/2023    CHOL 159 08/25/2023    TRIG 45 08/25/2023    HDL 75 08/25/2023    ALT 18 08/25/2023    AST 15 08/25/2023     12/07/2023    K 4.4 12/07/2023     12/07/2023    BUN 10 12/07/2023    CO2 25 12/07/2023    TSH 2.7 08/24/2006    HGBA1C 8.1 (H) 12/07/2023              CBC:   WBC   Date Value Ref Range Status   08/25/2023 9.35 3.90 - 12.70 K/uL Final     Hemoglobin   Date Value Ref Range Status   08/25/2023 13.3 12.0 - 16.0 g/dL Final     Hematocrit   Date Value Ref Range Status   08/25/2023 41.7 37.0 - 48.5 % Final     Platelets   Date Value Ref Range Status   08/25/2023 338 150 - 450 K/uL Final     MCV   Date Value Ref Range Status   08/25/2023 95 82 - 98 fL Final     RDW   Date Value Ref Range Status   08/25/2023 13.5 11.5 - 14.5 % Final     BMP:   Sodium   Date Value Ref Range Status   12/07/2023 139 136 - 145 mmol/L Final     Potassium   Date Value Ref Range Status   12/07/2023 4.4 3.5 - 5.1 mmol/L Final     Chloride   Date Value Ref Range Status   12/07/2023 105 95 - 110 mmol/L Final     CO2   Date Value Ref Range Status   12/07/2023 25 23 - 29 mmol/L Final     BUN   Date Value Ref Range Status   12/07/2023 10 6 - 20 mg/dL Final     Creatinine   Date Value Ref Range Status   12/07/2023 0.8 0.5 - 1.4 mg/dL Final     Glucose   Date Value Ref Range Status   12/07/2023 178 (H) 70 - 110 mg/dL Final     Calcium   Date Value Ref Range Status   12/07/2023 9.5 8.7 - 10.5 mg/dL Final     Magnesium   Date Value Ref Range Status   08/25/2023 1.6 1.6 - 2.6 mg/dL Final     LFTs:   Total Protein   Date Value Ref Range Status   08/25/2023 6.7 6.0 - 8.4 g/dL Final     Albumin   Date Value Ref Range Status   08/25/2023 3.4 (L) 3.5 - 5.2 g/dL Final     Total Bilirubin   Date Value Ref Range Status   08/25/2023 0.6 0.1 - 1.0 mg/dL  "Final     Comment:     For infants and newborns, interpretation of results should be based  on gestational age, weight and in agreement with clinical  observations.    Premature Infant recommended reference ranges:  Up to 24 hours.............<8.0 mg/dL  Up to 48 hours............<12.0 mg/dL  3-5 days..................<15.0 mg/dL  6-29 days.................<15.0 mg/dL       AST   Date Value Ref Range Status   08/25/2023 15 10 - 40 U/L Final     Alkaline Phosphatase   Date Value Ref Range Status   08/25/2023 59 55 - 135 U/L Final     ALT   Date Value Ref Range Status   08/25/2023 18 10 - 44 U/L Final     Coags: No results found for: "INR", "PROTIME", "PTT"  FLP:      Lab Results   Component Value Date    CHOL 159 08/25/2023    CHOL 129 08/24/2006     Lab Results   Component Value Date    HDL 75 08/25/2023    HDL 30.0 (L) 08/24/2006     Lab Results   Component Value Date    LDLCALC 75.0 08/25/2023    LDLCALC 65.4 08/24/2006     Lab Results   Component Value Date    TRIG 45 08/25/2023    TRIG 168 (H) 08/24/2006     Lab Results   Component Value Date    CHOLHDL 47.2 08/25/2023    CHOLHDL 23.3 08/24/2006      DM:      Hemoglobin A1C   Date Value Ref Range Status   12/07/2023 8.1 (H) 4.0 - 5.6 % Final     Comment:     ADA Screening Guidelines:  5.7-6.4%  Consistent with prediabetes  >or=6.5%  Consistent with diabetes    High levels of fetal hemoglobin interfere with the HbA1C  assay. Heterozygous hemoglobin variants (HbS, HgC, etc)do  not significantly interfere with this assay.   However, presence of multiple variants may affect accuracy.     08/25/2023 8.8 (H) 4.0 - 5.6 % Final     Comment:     ADA Screening Guidelines:  5.7-6.4%  Consistent with prediabetes  >or=6.5%  Consistent with diabetes    High levels of fetal hemoglobin interfere with the HbA1C  assay. Heterozygous hemoglobin variants (HbS, HgC, etc)do  not significantly interfere with this assay.   However, presence of multiple variants may affect accuracy.   " "    LDL Cholesterol   Date Value Ref Range Status   08/25/2023 75.0 63.0 - 159.0 mg/dL Final     Comment:     The National Cholesterol Education Program (NCEP) has set the  following guidelines (reference values) for LDL Cholesterol:  Optimal.......................<130 mg/dL  Borderline High...............130-159 mg/dL  High..........................160-189 mg/dL  Very High.....................>190 mg/dL       Creatinine   Date Value Ref Range Status   12/07/2023 0.8 0.5 - 1.4 mg/dL Final     Thyroid:   TSH   Date Value Ref Range Status   08/24/2006 2.7 0.4 - 4.0 uIU/ml Final     Anemia: No results found for: "IRON", "TIBC", "FERRITIN", "[id number]", "FOLATE"  Cardiac:   Troponin I   Date Value Ref Range Status   05/26/2017 <0.006 0.000 - 0.026 ng/mL Final     Comment:     The reference interval for Troponin I represents the 99th percentile   cutoff   for our facility and is consistent with 3rd generation assay   performance.       BNP   Date Value Ref Range Status   05/26/2017 <10 0 - 99 pg/mL Final     Comment:     Values of less than 100 pg/ml are consistent with non-CHF populations.     Urinalysis:   Urine Culture, Routine   Date Value Ref Range Status   04/23/2007   Final    >100,000 ORGANISMS/ML -ESCHERICHIA COLI  Amikacin             <=16       SENSITIVE     Amox/K Clav          <=8/4      SENSITIVE     Ceftriaxone          <=8        SENSITIVE     Cefazolin            <=8        SENSITIVE     Ciprofloxacin        <=1        SENSITIVE     Nitrofurantoin       <=32       SENSITIVE     Gentamicin           <=4        SENSITIVE     Bactrim              <=2/38     SENSITIVE     Tetracycline         <=4        SENSITIVE     Timentin             <=16       SENSITIVE     Tobramycin           <=4        SENSITIVE          Color, UA   Date Value Ref Range Status   08/25/2023 Yellow Yellow, Straw, Nelly Final     Specific Gravity, UA   Date Value Ref Range Status   08/25/2023 1.020 1.005 - 1.030 Final     Nitrite, UA "   Date Value Ref Range Status   08/25/2023 Positive (A) Negative Final     Ketones, UA   Date Value Ref Range Status   08/25/2023 Negative Negative Final     Urobilinogen, UA   Date Value Ref Range Status   10/23/2017 1.0 <2.0 EU/dL Final     WBC, UA   Date Value Ref Range Status   08/25/2023 11 (H) 0 - 5 /hpf Final       Other Results:  EKG (my interpretation):     Radiology:  Mammo Digital Screening Bilat w/ Kiran  Narrative: Result:  Mammo Digital Screening Bilat w/ Kiran    History:  Patient is 58 y.o. and is seen for a screening mammogram.    Films Compared:  Prior images (if available) were compared.     Findings:  This procedure was performed using tomosynthesis.   Computer-aided detection was utilized in the interpretation of this   examination.    The breasts have scattered areas of fibroglandular density. There is no   evidence of suspicious masses, microcalcifications or architectural   distortion.  Impression:    No mammographic evidence of malignancy.    BI-RADS Category 1: Negative    Recommendation:  Routine screening mammogram in 1 year is recommended.    Your estimated lifetime risk of breast cancer (to age 85) based on   Tyrer-Cuzick risk assessment model is 5.66 %.  According to the American   Cancer Society, patients with a lifetime breast cancer risk of 20% or   higher might benefit from supplemental screening tests, such as screening   breast MRI.          Assessment/Plan     Aisha Muñoz is a 58 y.o. female with:  1. Poorly controlled type 2 diabetes mellitus with gastroparesis  Assessment & Plan:  Check CMP, hemoglobin A1c today, schedule in-person or virtual appointment in 3 months to discuss whether medication needs to be increased to help with weight loss and glycemic control  Increase Ozempic to 1 mg subQ weekly  Refilled pioglitazone 45 mg daily  Repeat labs in 3 months 1 week prior to virtual visit or in-person visit in 3 months     Orders:  -     famotidine (PEPCID) 40 MG tablet;  Take 1 tablet (40 mg total) by mouth once daily.  Dispense: 90 tablet; Refill: 3  -     semaglutide (OZEMPIC) 1 mg/dose (2 mg/1.5 mL) PnIj; Inject 1 mg into the skin every 7 days.  Dispense: 3 mL; Refill: 11  -     HEMOGLOBIN A1C; Future; Expected date: 12/07/2023  -     BASIC METABOLIC PANEL; Future; Expected date: 12/07/2023    2. Acute viral syndrome  Assessment & Plan:  Irrigate nares with saline followed by Flonase 1 squirt b.i.d.   Mucinex DM b.i.d. with plenty of water   Suck on lozenges you, use cool mist humidifier  Albuterol MDI 2 puffs every 3-4 hours as needed PRN chest tightness, wheezing      Orders:  -     dextromethorphan-guaiFENesin (MUCINEX DM) 60-1,200 mg per 12 hr tablet; Take 1 tablet by mouth 2 (two) times daily as needed (cough).  Dispense: 20 tablet; Refill: 3  -     albuterol (VENTOLIN HFA) 90 mcg/actuation inhaler; Inhale 2 puffs into the lungs every 4 (four) hours as needed for Wheezing. Rescue  Dispense: 18 g; Refill: 1    3. Primary hypertension  Assessment & Plan:  Well-controlled on lisinopril 5 mg p.o. daily  -Check Bps at home weekly and prn symptoms for goal BP <130/80.  -Start healthy diet high in fiber (25-35 gm daily), low fat dairy, lean protein, low in saturated/trans fat (minimize cheese, creamy salad dressings); high in calcium/magnesium/potassium; 1.5 gm sodium diet; low in processed sugars and foods, ie  WHITE sugars, bread, pasta, rice, ice cream, cookies, cake, doughnuts  -Drink 6-8 glasses of water daily  -Moderate exercise 30 min 5 times per week or 75 min intense exercise biweekly   -Start 8-10 hour intermittent fasting diet   -Avoid eating 3 hours prior to bedtime  -Reduce alcohol to 1-2 glasses per day  -Avoid smoking, vaping, stimulant drugs/mediations ie illicit drugs, pseudo-ephedrine  -Minimize NSAIDs          4. Mixed hyperlipidemia  Assessment & Plan:  Stable on Zetia 10 mg and Livalo 4 mg p.o. daily with most recent lipid in 2023 revealing LDL of 75      5.  Myalgia due to statin  Assessment & Plan:  Currently tolerating Livalo without myalgias      6. Encounter for preventive measure  Assessment & Plan:  Keep colonoscopy appointment on March 7, 2024  Keep appointment with gyn doctor Veronika in January 23, 2024  Keep appointment with Podiatry on January 29, 2024  Patient refuses flu and COVID vaccines recommended today      7. Chronic right-sided low back pain with right-sided sciatica  Overview:  MRI LS without contrast 5/2020:   Changes of multilevel degenerative facet disease as described.   There is no evidence of high-grade spinal stenosis or direct nerve   root impingement.   2. Changes of previous instrumented fusion at L5-S1.       Assessment & Plan:  Follow with Neurosurgery as scheduled with Dr. Fritz  Keep appointment with pain management with Dr. Cano      Other orders  -     fluticasone propionate (FLONASE) 50 mcg/actuation nasal spray; 1 spray (50 mcg total) by Each Nostril route 2 (two) times daily as needed for Rhinitis.  Dispense: 16 g; Refill: 3             I spent a total of 40 minutes on the day of the visit.This includes face to face time and non-face to face time preparing to see the patient (eg, review of tests), obtaining and/or reviewing separately obtained history, documenting clinical information in the electronic or other health record, independently interpreting results and communicating results to the patient/family/caregiver, or care coordinator.   Code Status:     Tatianna Paulino MD

## 2023-12-07 NOTE — PATIENT INSTRUCTIONS
Pharmacy:    Pepcid 40mg daily  Ozempic 1mg weekly   Flonase 1 sq twice per day  Mucinex DM twice per day with lots of water     Labs today    Change password     Suck on lozenges and drink lots of water    Initiate GERD precautions ie lose weight, avoid eating 3 hours prior to bed, minimize caffeine, alcohol, tobacco, spicy, greasy, fatty foods; avoid peppermint chocolates, citrus and other acidic foods. Increase exercise to help with digestion and avoid lying down immediately after eating.  Elevate head of bed if GERD awakens pt from sleep.  Eat 6 small snacks rather than 3 large meals.

## 2023-12-08 NOTE — ASSESSMENT & PLAN NOTE
Stable on Zetia 10 mg and Livalo 4 mg p.o. daily with most recent lipid in 2023 revealing LDL of 75

## 2023-12-08 NOTE — ASSESSMENT & PLAN NOTE
Keep colonoscopy appointment on March 7, 2024  Keep appointment with gyn doctor Veronika in January 23, 2024  Keep appointment with Podiatry on January 29, 2024  Patient refuses flu and COVID vaccines recommended today

## 2023-12-08 NOTE — ASSESSMENT & PLAN NOTE
Check CMP, hemoglobin A1c today, schedule in-person or virtual appointment in 3 months to discuss whether medication needs to be increased to help with weight loss and glycemic control  Increase Ozempic to 1 mg subQ weekly  Refilled pioglitazone 45 mg daily  Repeat labs in 3 months 1 week prior to virtual visit or in-person visit in 3 months

## 2023-12-08 NOTE — ASSESSMENT & PLAN NOTE
Start Pepcid 40 mg p.o. daily in lieu of PPI   Initiate GERD precautions ie lose weight, avoid eating 3 hours prior to bed, minimize caffeine, alcohol, tobacco, spicy, greasy, fatty foods; avoid peppermint chocolates, citrus and other acidic foods. Increase exercise to help with digestion and avoid lying down immediately after eating.  Elevate head of bed if GERD awakens pt from sleep.  Eat 6 small snacks rather than 3 large meals.    Increase Ozempic to 1 mg subQ weekly   Check CMP, hemoglobin A1c, urine microalbumin  today, schedule in-person or virtual appointment in 3 months to discuss whether medication needs to be increased to help with weight loss and glycemic control

## 2023-12-08 NOTE — ASSESSMENT & PLAN NOTE
Follow with Neurosurgery as scheduled with Dr. Fritz  Keep appointment with pain management with Dr. Cano

## 2023-12-08 NOTE — ASSESSMENT & PLAN NOTE
Irrigate nares with saline followed by Flonase 1 squirt b.i.d.   Mucinex DM b.i.d. with plenty of water   Suck on lozenges you, use cool mist humidifier  Albuterol MDI 2 puffs every 3-4 hours as needed PRN chest tightness, wheezing

## 2024-02-20 DIAGNOSIS — E11.65 POORLY CONTROLLED DIABETES MELLITUS: Primary | ICD-10-CM

## 2024-02-20 DIAGNOSIS — M70.41 PREPATELLAR BURSITIS OF RIGHT KNEE: Primary | ICD-10-CM

## 2024-02-20 RX ORDER — TIRZEPATIDE 5 MG/.5ML
5 INJECTION, SOLUTION SUBCUTANEOUS
Qty: 4 PEN | Refills: 3 | Status: SHIPPED | OUTPATIENT
Start: 2024-02-20 | End: 2024-04-26

## 2024-02-23 ENCOUNTER — HOSPITAL ENCOUNTER (OUTPATIENT)
Dept: RADIOLOGY | Facility: HOSPITAL | Age: 59
Discharge: HOME | End: 2024-02-23
Attending: PHYSICIAN ASSISTANT
Payer: MEDICARE

## 2024-02-23 ENCOUNTER — OFFICE VISIT (OUTPATIENT)
Dept: ORTHOPEDICS | Facility: CLINIC | Age: 59
End: 2024-02-23
Payer: MEDICARE

## 2024-02-23 VITALS — WEIGHT: 180.56 LBS | BODY MASS INDEX: 29.02 KG/M2 | HEIGHT: 66 IN

## 2024-02-23 DIAGNOSIS — M70.41 PREPATELLAR BURSITIS OF RIGHT KNEE: ICD-10-CM

## 2024-02-23 DIAGNOSIS — S86.001A INJURY OF RIGHT ACHILLES TENDON, INITIAL ENCOUNTER: ICD-10-CM

## 2024-02-23 DIAGNOSIS — M25.571 ACUTE RIGHT ANKLE PAIN: ICD-10-CM

## 2024-02-23 DIAGNOSIS — M25.571 ACUTE RIGHT ANKLE PAIN: Primary | ICD-10-CM

## 2024-02-23 PROCEDURE — 73562 X-RAY EXAM OF KNEE 3: CPT | Mod: 26,59,LT, | Performed by: RADIOLOGY

## 2024-02-23 PROCEDURE — 3008F BODY MASS INDEX DOCD: CPT | Mod: CPTII,S$GLB,, | Performed by: PHYSICIAN ASSISTANT

## 2024-02-23 PROCEDURE — 99999 PR PBB SHADOW E&M-EST. PATIENT-LVL IV: CPT | Mod: PBBFAC,,, | Performed by: PHYSICIAN ASSISTANT

## 2024-02-23 PROCEDURE — 73562 X-RAY EXAM OF KNEE 3: CPT | Mod: TC,LT

## 2024-02-23 PROCEDURE — 3061F NEG MICROALBUMINURIA REV: CPT | Mod: CPTII,S$GLB,, | Performed by: PHYSICIAN ASSISTANT

## 2024-02-23 PROCEDURE — 3052F HG A1C>EQUAL 8.0%<EQUAL 9.0%: CPT | Mod: CPTII,S$GLB,, | Performed by: PHYSICIAN ASSISTANT

## 2024-02-23 PROCEDURE — 73610 X-RAY EXAM OF ANKLE: CPT | Mod: 26,RT,, | Performed by: RADIOLOGY

## 2024-02-23 PROCEDURE — 73610 X-RAY EXAM OF ANKLE: CPT | Mod: TC,RT

## 2024-02-23 PROCEDURE — 3066F NEPHROPATHY DOC TX: CPT | Mod: CPTII,S$GLB,, | Performed by: PHYSICIAN ASSISTANT

## 2024-02-23 PROCEDURE — 73564 X-RAY EXAM KNEE 4 OR MORE: CPT | Mod: 26,RT,, | Performed by: RADIOLOGY

## 2024-02-23 PROCEDURE — 99204 OFFICE O/P NEW MOD 45 MIN: CPT | Mod: S$GLB,,, | Performed by: PHYSICIAN ASSISTANT

## 2024-02-23 RX ORDER — DICLOFENAC SODIUM 10 MG/G
GEL TOPICAL
Qty: 1 EACH | Refills: 0 | Status: SHIPPED | OUTPATIENT
Start: 2024-02-23

## 2024-02-23 NOTE — PROGRESS NOTES
"Subjective:     Patient ID: Aisha Muñoz is a 58 y.o. female.    Chief Complaint: Swelling and Pain of the Right Ankle and Pain of the Right Knee    HPI    Patient is a 58 year old female who presents to clinic with chief complaint of right knee and ankle issues.   KNEE: Patient has had right anterior knee swelling x 2-3 weeks. No pain but some tenderness to distal area. Denied any trauma. She will take some tylenol. No ice or heat.   ANKLE: posterior lateral swelling x 4-6 weeks. No trauma. Reports pain with walking and first getting up in the morning. Has tried tylenol with out relief. No numbness or tingling.     Lab Results   Component Value Date    HGBA1C 8.1 (H) 12/07/2023       Estimated body mass index is 29.14 kg/m² as calculated from the following:    Height as of this encounter: 5' 6" (1.676 m).    Weight as of this encounter: 81.9 kg (180 lb 8.9 oz).        Review of Systems   Constitutional: Negative for chills and fever.   Cardiovascular:  Negative for chest pain.   Respiratory:  Negative for cough and shortness of breath.    Skin:  Negative for color change, dry skin, itching, nail changes, poor wound healing and rash.   Musculoskeletal:         Right ankle and right knee    Neurological:  Negative for dizziness.   Psychiatric/Behavioral:  Negative for altered mental status. The patient is not nervous/anxious.    All other systems reviewed and are negative.      Objective:       General    Constitutional: She is oriented to person, place, and time. She appears well-developed and well-nourished. No distress.   HENT:   Head: Atraumatic.   Eyes: Conjunctivae are normal.   Cardiovascular:  Normal rate.            Pulmonary/Chest: Effort normal.   Neurological: She is alert and oriented to person, place, and time.   Psychiatric: She has a normal mood and affect. Her behavior is normal.           Right Knee Exam     Range of Motion   The patient has normal right knee ROM.    Tests   Ligament Examination "   Lachman: normal (-1 to 2mm)   PCL-Posterior Drawer: normal (0 to 2mm)     MCL - Valgus: normal (0 to 2mm)  Reverse Pivot Shift: normal (Equal)      Physical Exam  Constitutional:       General: She is not in acute distress.     Appearance: She is well-developed and well-nourished. She is not diaphoretic.   HENT:      Head: Atraumatic.   Eyes:      Conjunctiva/sclera: Conjunctivae normal.   Cardiovascular:      Rate and Rhythm: Normal rate.   Pulmonary:      Effort: Pulmonary effort is normal.   Neurological:      Mental Status: She is alert and oriented to person, place, and time.   Psychiatric:         Mood and Affect: Mood and affect normal.         Behavior: Behavior normal.     RADS: reviewed by myself:   ANKLE: Osseous mineralization is preserved. No acute displaced fractures. No suspicious lytic or blastic lesions. Tibial plafond and talar dome are intact. Ankle mortise is symmetric. Tibiotalar, subtalar and midtarsal cartilage spaces are preserved. No subluxation or dislocation. Small distal Achilles enthesophyte. Increased soft tissue attenuation at the level of Kager's fat pad, possibly related to an accessory soleus muscle.     KNEE:   Right knee:     No fracture.  Mild narrowing medial compartment without genu varum.  Preserved lateral and patellofemoral compartments.  No significant periarticular spurring.  Question small suprapatellar bursal effusion.  Obvious prepatellar and more side infrapatellar soft tissue swelling.  Clinical correlation.     Left knee:     No fracture.  Mild narrowing medial compartment without genu varum.  Preserved lateral and patellofemoral compartments.  No prepatellar soft tissue swelling.  Assessment:     Encounter Diagnoses   Name Primary?    Prepatellar bursitis of right knee     Acute right ankle pain Yes    Injury of right Achilles tendon, initial encounter        Plan:      Aisha was seen today for swelling, pain and pain.    Diagnoses and all orders for this  visit:    Acute right ankle pain  -     X-Ray Ankle Complete Right; Future    Prepatellar bursitis of right knee  -     Ambulatory referral/consult to Orthopedics  -     X-ray Knee Ortho Right with Flexion; Future  -     diclofenac sodium (VOLTAREN) 1 % Gel; 4 grams to effected area 4 times daily do not exceed 32 grams    Injury of right Achilles tendon, initial encounter  -     diclofenac sodium (VOLTAREN) 1 % Gel; 4 grams to effected area 4 times daily do not exceed 32 grams  -     Ambulatory referral/consult to Physical/Occupational Therapy; Future      Dicussed plan with the patient. At this time will treat   KNEE: RICE, Wrap, voltren     ANKLE: Roby Villalobos, PT     Patient is to make PT appointment herself. She is to return to clinic as needed.

## 2024-02-29 ENCOUNTER — TELEPHONE (OUTPATIENT)
Dept: ENDOSCOPY | Facility: HOSPITAL | Age: 59
End: 2024-02-29
Payer: MEDICARE

## 2024-02-29 NOTE — TELEPHONE ENCOUNTER
Colonoscopy instructions emailed to pt ([email or web address]):        Colonoscopy Procedure Prep Instructions  SUPREP (sodium sulfate/potassium sulfate/magnesium sulfate)      Date of procedure: 3/7/24 - Arrive at 7:15 AM    Location of Department:   [facility] [address] [place] [address]  Take the Elevators to 2nd Floor Endoscopy Procedural Area    As soon as possible:   your prep from pharmacy and over the counter DULCOLAX LAXATIVE TABLETS     On the day before your procedure   What You CAN do:   You may have clear liquids ONLY -see below for list.     Liquids That Are OK to Drink:   Water  Sports drinks (Gatorade, Power-Aid)  Coffee or tea (no cream or nondairy creamer)  Clear juices without pulp (apple, white grape)  Gelatin desserts (no fruit or toppings)  Clear soda (sprite, coke, ginger ale)  Chicken broth (until 12 midnight the night before procedure)      What You CANNOT do:   Do not EAT solid food, drink milk or anything colored red.  Do not drink alcohol.  Do not take oral medications within 1 hour of starting each dose of SUPREP.  No gum chewing or candy morning of procedure.      Note:   (Please disregard the insert instructions from pharmacy).  SUPREP Bowel Prep Kit is indicated for cleansing of the colon as a preparation for colonoscopy in adults.   Be sure to tell your doctor about all the medicines you take, including prescription and non-prescription medicines, vitamins, and herbal supplements. SUPREP Bowel Prep Kit may affect how other medicines work.  Medication taken by mouth may not be absorbed properly when taken within 1 hour before the start of each dose of SUPREP Bowel Prep Kit.    It is not uncommon to experience some abdominal cramping, nausea and/or vomiting when taking the prep. If you have nausea and/or vomiting while taking the prep, stop drinking for 20 to 30 minutes then continue.    How to take prep:    SUPREP Bowel Prep Kit is a (2-day) prep.    Both 6-ounce bottles are required for a complete preparation for colonoscopy. Dilute the solution concentrate as directed prior to use. You must drink water with each dose of SUPREP, and additional water after each dose.    DOSE 1--Day Before Colonoscopy 3/6/24    Drink at least 6 to 8 glasses of clear liquids from time you wake up until you begin your prep and then continue until bedtime to avoid dehydration.     12:00 pm (NOON) Take four (4) Dulcolax (Bisacodyl) tablets with at least 8 ounces or more of clear liquids.      6:00 pm:    You must complete Steps 1 through 4 using one (1) 6-ounce bottle before going to bed as shown below:    Step 1-Pour ONE (1) 6-ounce bottle of SUPREP liquid into the mixing container.  Step 2-Add cool drinking water to the 16-ounce line on the container and mix.  Step 3-Drink ALL the liquid in the container.  Step 4-You must drink two (2) more 16-ounce containers of water over the next 1 hour.  IMPORTANT: If you experience preparation-related symptoms (for example, nausea, bloating, or cramping), stop, or slow the rate of drinking the additional water until your symptoms decrease.    DOSE 2--Day of the Colonoscopy 3/7/24 at 2-3 AM    For this dose, repeat Steps 1 through 4 shown above using the other 6-ounce bottle.   You may continue drinking water/clear liquids until 4 hours before your colonoscopy (4:15 AM).    For more information about your procedure, please watch this informational video. It is important to watch this animated consent video prior to your arrival. If you haven't watched the video prior to arriving, you will be asked to watch it during admission which can cause delays.     Options for viewing:  Using a keyboard:  press and hold the control tab (Ctrl) and left mouse click to follow link          Colonoscopy Instructional Video                                                         OR    Type link address into your web browser's address  bar:  [email or web address]    Using a mobile phone: tap on web address/link.               IMPORTANT INFORMATION TO KNOW BEFORE YOUR PROCEDURE  [facility] 2nd Floor--  Enter at the rear of the building through the emergency department screening station or the Outpatient Registration door, then continue to endoscopy department on the 2nd floor.      If your procedure requires the administration of anesthesia, it is necessary for a responsible adult to drive you home. (Medical Transportation, Uber, Lyft, Taxi, etc. may ONLY be used if a responsible adult is present to accompany you home.  The responsible adult CANNOT be the  of the service).   person must be available to return to pick you up within 15 minutes of being notified of discharge.    Due to the limited socially distant seating in our waiting room, please limit your guest to one person. If someone accompanies you for this procedure into the facility:  Consider having them proceed to an area that is socially distant other than the lobby until a member of the medical team contacts them to provide an update after the procedure.   Also, please consider being dropped off and picked up from the facility.    Please bring a picture ID, insurance card, & copayment    Take Medications as directed below:    If you are taking any injectable medications(s) weekly for weight loss and/or diabetes (other than insulin), please hold starting on 2/29/24.    If you begin taking any blood thinning medications or injectable weight loss/diabetes medications (other than insulin), please contact the endoscopy scheduling department listed below as soon as possible.    If you are diabetic see the attached instruction sheet regarding your medication.     If you take HEART, BLOOD PRESSURE, SEIZURE, PAIN, LUNG (including inhalers/nebulizers), ANTI-REJECTION (transplant patients), or PSYCHIATRIC medications, please take at your  regular times with a sip of water, unless otherwise directed by the scheduling nurse.     Important contact information:    Endoscopy [phone] Hours of operation Monday-Friday 8:00-4:30pm.    Questions about insurance or financial obligations call [phone] or [phone].    After hours questions requiring immediate assistance, contact [facility] nurse line at [phone] or [phone].     NOTE:     On occasion, unforeseen circumstances may cause a delay in your procedure start time. We respect your time and appreciate your patience during these circumstances.

## 2024-03-07 ENCOUNTER — TELEPHONE (OUTPATIENT)
Dept: ENDOSCOPY | Facility: HOSPITAL | Age: 59
End: 2024-03-07
Payer: MEDICARE

## 2024-03-07 ENCOUNTER — NURSE TRIAGE (OUTPATIENT)
Dept: ADMINISTRATIVE | Facility: CLINIC | Age: 59
End: 2024-03-07
Payer: MEDICARE

## 2024-03-07 NOTE — TELEPHONE ENCOUNTER
----- Message from Mmii Gallegos sent at 3/7/2024  8:21 AM CST -----  Regarding: FW: Reschedule colonoscopy, patient did not follow prep instructions    ----- Message -----  From: Della Hudson MD  Sent: 3/7/2024   7:46 AM CST  To: [facility]  Subject: Reschedule colonoscopy, patient did not foll#    Hello,    This patient did not follow prep instructions and is still having brown stool today.  She will be cancelled.  Can someone reach out to her to reschedule and re go over instructions?  She does take Ozempic.    Thanks,    Della

## 2024-03-07 NOTE — TELEPHONE ENCOUNTER
Contacted the patient to schedule an endoscopy procedure(s) colonoscopy . The patient did not answer the call and left a voice message requesting a call back.

## 2024-03-07 NOTE — TELEPHONE ENCOUNTER
Spoke to patient to schedule procedure(s) Colonoscopy       Physician to perform procedure(s) Dr. AMALIA Hudson   Date of Procedure (s) 05/23/2024   Arrival Time 7:45 AM   Time of Procedure(s) 8:45 Am    Location of Procedure(s) [place] 2nd Floor  Type of Rx Prep sent to patient: [facility]  Instructions provided to patient via [facility]    Patient was informed on the following information and verbalized understanding. Screening questionnaire reviewed with patient and complete. If procedure requires anesthesia, a responsible adult needs to be present to accompany the patient home, patient cannot drive after receiving anesthesia.If procedure requires anesthesia, a responsible adult needs to be present to accompany the patient home, patient cannot drive after receiving anesthesia. Appointment details are tentative, especially check-in time. Patient will receive a prep-op call 7 days prior to confirm check-in time for procedure. If applicable the patient should contact their pharmacy to verify Rx for procedure prep is ready for pick-up. Patient was advised to call the scheduling department at [phone] if pharmacy states no Rx is available. Patient was advised to call the endoscopy scheduling department if any questions or concerns arise.      [facility] Endoscopy Scheduling Department

## 2024-03-07 NOTE — TELEPHONE ENCOUNTER
Pt was calling to find out what time her procedure is for this am.  Pt made aware to be thee at 7:15am, states she is no route and should arrive on time.  Care advice was information only.  Patient verbally understands, all questions answered, advised to call back for any worsening symptoms or further needs.     Reason for Disposition   Question about upcoming scheduled test, no triage required and triager able to answer question    Protocols used: Information Only Call - No Triage-A-

## 2024-03-21 ENCOUNTER — OFFICE VISIT (OUTPATIENT)
Dept: PRIMARY CARE CLINIC | Facility: CLINIC | Age: 59
End: 2024-03-21
Payer: MEDICARE

## 2024-03-21 VITALS
WEIGHT: 182.31 LBS | SYSTOLIC BLOOD PRESSURE: 132 MMHG | OXYGEN SATURATION: 98 % | HEIGHT: 66 IN | HEART RATE: 73 BPM | BODY MASS INDEX: 29.3 KG/M2 | DIASTOLIC BLOOD PRESSURE: 94 MMHG

## 2024-03-21 DIAGNOSIS — R11.0 NAUSEA: ICD-10-CM

## 2024-03-21 DIAGNOSIS — I10 PRIMARY HYPERTENSION: Chronic | ICD-10-CM

## 2024-03-21 DIAGNOSIS — E11.43 POORLY CONTROLLED TYPE 2 DIABETES MELLITUS WITH GASTROPARESIS: Primary | ICD-10-CM

## 2024-03-21 DIAGNOSIS — G89.29 CHRONIC PAIN OF RIGHT ANKLE: ICD-10-CM

## 2024-03-21 DIAGNOSIS — E78.2 MIXED HYPERLIPIDEMIA: ICD-10-CM

## 2024-03-21 DIAGNOSIS — E11.65 TYPE 2 DIABETES MELLITUS WITH HYPERGLYCEMIA, WITHOUT LONG-TERM CURRENT USE OF INSULIN: ICD-10-CM

## 2024-03-21 DIAGNOSIS — E11.65 POORLY CONTROLLED TYPE 2 DIABETES MELLITUS WITH GASTROPARESIS: Primary | ICD-10-CM

## 2024-03-21 DIAGNOSIS — M25.571 CHRONIC PAIN OF RIGHT ANKLE: ICD-10-CM

## 2024-03-21 DIAGNOSIS — Z29.9 ENCOUNTER FOR PREVENTIVE MEASURE: ICD-10-CM

## 2024-03-21 PROBLEM — B34.9 ACUTE VIRAL SYNDROME: Status: RESOLVED | Noted: 2023-12-07 | Resolved: 2024-03-21

## 2024-03-21 PROCEDURE — 99214 OFFICE O/P EST MOD 30 MIN: CPT | Mod: S$GLB,,, | Performed by: INTERNAL MEDICINE

## 2024-03-21 PROCEDURE — 99999 PR PBB SHADOW E&M-EST. PATIENT-LVL V: CPT | Mod: PBBFAC,,, | Performed by: INTERNAL MEDICINE

## 2024-03-21 RX ORDER — ONDANSETRON 4 MG/1
4 TABLET, ORALLY DISINTEGRATING ORAL EVERY 8 HOURS PRN
Qty: 90 TABLET | Refills: 3 | Status: SHIPPED | OUTPATIENT
Start: 2024-03-21

## 2024-03-21 NOTE — PATIENT INSTRUCTIONS
Take both cholesterol meds:  Ezetibmibe 10mg and Livalo 4mg every day     Increase Mounjaro 7.5mg SQ weekly       Labs this Friday at 4pm here     Keep colon appt May 2024    Refer back to ortho ASAP

## 2024-03-21 NOTE — PROGRESS NOTES
[facility] Progress Note      Chief Complaint     Follow-up and Diabetes      Subjective:      History of Present Illness:  Aisha Muñoz is a 58 y.o. female     HLD: not taking Zetia 10mg and Livalo since did not know it was for cholesterol     DM: HbA1C 8.1 with glucose 178 in 12/2023 compliant with Mounjaro 5mg SQ weekly; actos 45mg daily; glucoses 110-120  Pt has not lost any weight on Mounjaro but states her appetie is markedly suppressed; would like to increase dose    Right ankle spur: now in a boot x 1 mo without improvement; would like to see ortho NP again due to persistent right ankle swelling and pain; today feels very nauseated since didn't eat breakfast and took percocet last night but also on Mounjaro.     HTN:  pt states Bps are usually well-controlled on Lisinopril 5mg daily; denies CP, SOB, cough      Past Medical History:  Past Medical History:   Diagnosis Date    Acute midline low back pain with right-sided sciatica 06/07/2023    Acute viral syndrome 12/07/2023    Arthritis     Brain tumor (benign) 1997    Chronic back pain     Diabetes mellitus     Gastroesophageal reflux disease without esophagitis 08/25/2023    Hypertension     Nausea 03/21/2024    Pain aggravated by activities of daily living 06/27/2023       Past Surgical History:  Past Surgical History:   Procedure Laterality Date    BACK SURGERY      no hardware  fusion    BRAIN SURGERY  1997    benign tumor removed from brain    carpal tunnel release      rosa m hands    HYSTERECTOMY         Allergies:  Review of patient's allergies indicates:   Allergen Reactions    Fish containing products Shortness Of Breath, Swelling and Rash    Grape Itching    Strawberries [strawberry] Itching    Banana Itching    Lyrica [pregabalin] Other (See Comments)     headache       Home Medications:  Current Outpatient Medications   Medication Sig Dispense Refill    acetaminophen (TYLENOL) 325 MG tablet Take 325 mg by mouth as  "needed for Pain.      albuterol (VENTOLIN HFA) 90 mcg/actuation inhaler Inhale 2 puffs into the lungs every 4 (four) hours as needed for Wheezing. Rescue 18 g 1    blood-glucose sensor (FREESTYLE DAYANA 3 SENSOR) Cheryl 1 Device by Misc.(Non-Drug; Combo Route) route continuous. 1 each 0    diclofenac sodium (VOLTAREN) 1 % Gel 4 grams to effected area 4 times daily do not exceed 32 grams 1 each 0    EPINEPHrine (EPIPEN) 0.3 mg/0.3 mL AtIn Inject into the muscle.      ezetimibe (ZETIA) 10 mg tablet Take 1 tablet (10 mg total) by mouth once daily. 90 tablet 3    famotidine (PEPCID) 40 MG tablet Take 1 tablet (40 mg total) by mouth once daily. 90 tablet 3    fluticasone propionate (FLONASE) 50 mcg/actuation nasal spray 1 spray (50 mcg total) by Each Nostril route 2 (two) times daily as needed for Rhinitis. 16 g 3    LIDOcaine (LIDODERM) 5 % SMARTSI-2 Patch(s) Topical Daily PRN      lisinopril (PRINIVIL,ZESTRIL) 5 MG tablet Take 5 mg by mouth once daily.      oxyCODONE-acetaminophen (PERCOCET)  mg per tablet Take 1 tablet by mouth 3 times a day as needed for pain 90 tablet 0    oxyCODONE-acetaminophen (PERCOCET)  mg per tablet Take 1 tablet by mouth 3 (three) times daily as needed. 90 tablet 0    pen needle, diabetic (BD KARINA 2ND GEN PEN NEEDLE) 32 gauge x 5/32" Ndle 1 Dose by Misc.(Non-Drug; Combo Route) route once daily. 100 each 3    pioglitazone (ACTOS) 45 MG tablet Take 45 mg by mouth once daily.      pitavastatin calcium (LIVALO) 4 mg Tab Take 4 mg by mouth once daily. 90 tablet 3    tirzepatide (MOUNJARO) 5 mg/0.5 mL PnIj Inject 5 mg into the skin every 7 days. 4 Pen 3    ondansetron (ZOFRAN-ODT) 4 MG TbDL Take 1 tablet (4 mg total) by mouth every 8 (eight) hours as needed (nausea). 90 tablet 3    tirzepatide 7.5 mg/0.5 mL PnIj Inject 7.5 mg into the skin every 7 days. 1 Pen 3     No current facility-administered medications for this visit.        Family History:  Family History   Problem Relation Age " "of Onset    Breast cancer Other        Social History:  Social History     Tobacco Use    Smoking status: Former     Current packs/day: 0.00     Types: Cigarettes     Quit date: 11/8/2013     Years since quitting: 10.3    Smokeless tobacco: Never   Substance Use Topics    Alcohol use: No    Drug use: No       Review of Systems:  Pertinent positives and negatives listed in HPI. All other systems are reviewed and are negative.    Health Maintaince :   Health Maintenance Topics with due status: Not Due       Topic Last Completion Date    TETANUS VACCINE 05/25/2019    Diabetes Urine Screening 08/25/2023    Lipid Panel 08/25/2023    Mammogram 09/28/2023           Eye:   Dental:     Immunizations:   Cancer Screening:  Colonoscopy: rescheduled in May 2024    The 10-year ASCVD risk score (Sara BROWN, et al., 2019) is: 9.9%    Values used to calculate the score:      Age: 58 years      Sex: Female      Is Non- : Yes      Diabetic: Yes      Tobacco smoker: No      Systolic Blood Pressure: 132 mmHg      Is BP treated: Yes      HDL Cholesterol: 75 mg/dL      Total Cholesterol: 159 mg/dL      Objective:   BP (!) 132/94 (BP Location: Right arm, Patient Position: Sitting, BP Method: Medium (Manual))   Pulse 73   Ht 5' 6" (1.676 m)   Wt 82.7 kg (182 lb 5.1 oz)   SpO2 98%   BMI 29.43 kg/m²      Body mass index is 29.43 kg/m².       Physical Examination:  General: Alert and awake in no apparent distress  HEENT: Normocephalic and atraumatic; Tms WNL  Eyes:  PERRL; EOMi with anicteric sclera and clear conjunctivae  Mouth:  Oropharynx clear and without exudate; moist mucous membranes  Neck:   Cervical nodes not enlarged; supple; no bruits  Cardio:  Regular rate and rhythm with normal S1 and S2; no murmurs or rubs  Resp:  CTAB; respirations unlabored; no wheezes, crackles or rhonchi  Extrem: Warm and well-perfused with no clubbing, cyanosis or edema; right boot in place with bulbous tender right patella "   Neuro:  AAOx3; cooperative and pleasant with no focal deficits    Laboratory:      Most Recent Data:  Lab Results   Component Value Date    WBC 9.35 08/25/2023    HGB 13.3 08/25/2023    HCT 41.7 08/25/2023     08/25/2023    CHOL 159 08/25/2023    TRIG 45 08/25/2023    HDL 75 08/25/2023    ALT 18 08/25/2023    AST 15 08/25/2023     12/07/2023    K 4.4 12/07/2023     12/07/2023    BUN 10 12/07/2023    CO2 25 12/07/2023    TSH 2.7 08/24/2006    HGBA1C 8.1 (H) 12/07/2023              CBC:   WBC   Date Value Ref Range Status   08/25/2023 9.35 3.90 - 12.70 K/uL Final     Hemoglobin   Date Value Ref Range Status   08/25/2023 13.3 12.0 - 16.0 g/dL Final     Hematocrit   Date Value Ref Range Status   08/25/2023 41.7 37.0 - 48.5 % Final     Platelets   Date Value Ref Range Status   08/25/2023 338 150 - 450 K/uL Final     MCV   Date Value Ref Range Status   08/25/2023 95 82 - 98 fL Final     RDW   Date Value Ref Range Status   08/25/2023 13.5 11.5 - 14.5 % Final     BMP:   Sodium   Date Value Ref Range Status   12/07/2023 139 136 - 145 mmol/L Final     Potassium   Date Value Ref Range Status   12/07/2023 4.4 3.5 - 5.1 mmol/L Final     Chloride   Date Value Ref Range Status   12/07/2023 105 95 - 110 mmol/L Final     CO2   Date Value Ref Range Status   12/07/2023 25 23 - 29 mmol/L Final     BUN   Date Value Ref Range Status   12/07/2023 10 6 - 20 mg/dL Final     Creatinine   Date Value Ref Range Status   12/07/2023 0.8 0.5 - 1.4 mg/dL Final     Glucose   Date Value Ref Range Status   12/07/2023 178 (H) 70 - 110 mg/dL Final     Calcium   Date Value Ref Range Status   12/07/2023 9.5 8.7 - 10.5 mg/dL Final     Magnesium   Date Value Ref Range Status   08/25/2023 1.6 1.6 - 2.6 mg/dL Final     LFTs:   Total Protein   Date Value Ref Range Status   08/25/2023 6.7 6.0 - 8.4 g/dL Final     Albumin   Date Value Ref Range Status   08/25/2023 3.4 (L) 3.5 - 5.2 g/dL Final     Total Bilirubin   Date Value Ref Range Status  "  08/25/2023 0.6 0.1 - 1.0 mg/dL Final     Comment:     For infants and newborns, interpretation of results should be based  on gestational age, weight and in agreement with clinical  observations.    Premature Infant recommended reference ranges:  Up to 24 hours.............<8.0 mg/dL  Up to 48 hours............<12.0 mg/dL  3-5 days..................<15.0 mg/dL  6-29 days.................<15.0 mg/dL       AST   Date Value Ref Range Status   08/25/2023 15 10 - 40 U/L Final     Alkaline Phosphatase   Date Value Ref Range Status   08/25/2023 59 55 - 135 U/L Final     ALT   Date Value Ref Range Status   08/25/2023 18 10 - 44 U/L Final     Coags: No results found for: "INR", "PROTIME", "PTT"  FLP:      Lab Results   Component Value Date    CHOL 159 08/25/2023    CHOL 129 08/24/2006     Lab Results   Component Value Date    HDL 75 08/25/2023    HDL 30.0 (L) 08/24/2006     Lab Results   Component Value Date    LDLCALC 75.0 08/25/2023    LDLCALC 65.4 08/24/2006     Lab Results   Component Value Date    TRIG 45 08/25/2023    TRIG 168 (H) 08/24/2006     Lab Results   Component Value Date    CHOLHDL 47.2 08/25/2023    CHOLHDL 23.3 08/24/2006      DM:      Hemoglobin A1C   Date Value Ref Range Status   12/07/2023 8.1 (H) 4.0 - 5.6 % Final     Comment:     ADA Screening Guidelines:  5.7-6.4%  Consistent with prediabetes  >or=6.5%  Consistent with diabetes    High levels of fetal hemoglobin interfere with the HbA1C  assay. Heterozygous hemoglobin variants (HbS, HgC, etc)do  not significantly interfere with this assay.   However, presence of multiple variants may affect accuracy.     08/25/2023 8.8 (H) 4.0 - 5.6 % Final     Comment:     ADA Screening Guidelines:  5.7-6.4%  Consistent with prediabetes  >or=6.5%  Consistent with diabetes    High levels of fetal hemoglobin interfere with the HbA1C  assay. Heterozygous hemoglobin variants (HbS, HgC, etc)do  not significantly interfere with this assay.   However, presence of multiple " "variants may affect accuracy.       LDL Cholesterol   Date Value Ref Range Status   08/25/2023 75.0 63.0 - 159.0 mg/dL Final     Comment:     The National Cholesterol Education Program (NCEP) has set the  following guidelines (reference values) for LDL Cholesterol:  Optimal.......................<130 mg/dL  Borderline High...............130-159 mg/dL  High..........................160-189 mg/dL  Very High.....................>190 mg/dL       Creatinine   Date Value Ref Range Status   12/07/2023 0.8 0.5 - 1.4 mg/dL Final     Thyroid:   TSH   Date Value Ref Range Status   08/24/2006 2.7 0.4 - 4.0 uIU/ml Final     Anemia: No results found for: "IRON", "TIBC", "FERRITIN", "GZGVYOGY46", "FOLATE"  Cardiac:   Troponin I   Date Value Ref Range Status   05/26/2017 <0.006 0.000 - 0.026 ng/mL Final     Comment:     The reference interval for Troponin I represents the 99th percentile   cutoff   for our facility and is consistent with 3rd generation assay   performance.       BNP   Date Value Ref Range Status   05/26/2017 <10 0 - 99 pg/mL Final     Comment:     Values of less than 100 pg/ml are consistent with non-CHF populations.     Urinalysis:   Urine Culture, Routine   Date Value Ref Range Status   04/23/2007   Final    >100,000 ORGANISMS/ML -ESCHERICHIA COLI  Amikacin             <=16       SENSITIVE     Amox/K Clav          <=8/4      SENSITIVE     Ceftriaxone          <=8        SENSITIVE     Cefazolin            <=8        SENSITIVE     Ciprofloxacin        <=1        SENSITIVE     Nitrofurantoin       <=32       SENSITIVE     Gentamicin           <=4        SENSITIVE     Bactrim              <=2/38     SENSITIVE     Tetracycline         <=4        SENSITIVE     Timentin             <=16       SENSITIVE     Tobramycin           <=4        SENSITIVE          Color, UA   Date Value Ref Range Status   08/25/2023 Yellow Yellow, Straw, Nelly Final     Specific Gravity, UA   Date Value Ref Range Status   08/25/2023 1.020 1.005 " - 1.030 Final     Nitrite, UA   Date Value Ref Range Status   08/25/2023 Positive (A) Negative Final     Ketones, UA   Date Value Ref Range Status   08/25/2023 Negative Negative Final     Urobilinogen, UA   Date Value Ref Range Status   10/23/2017 1.0 <2.0 EU/dL Final     WBC, UA   Date Value Ref Range Status   08/25/2023 11 (H) 0 - 5 /hpf Final       Other Results:  EKG (my interpretation):     Radiology:  X-Ray Ankle Complete Right  Narrative: EXAMINATION:  XR ANKLE COMPLETE 3 VIEW RIGHT    CLINICAL HISTORY:  Acute right ankle pain.    TECHNIQUE:  Weightbearing PA, lateral and oblique views of the right ankle.    COMPARISON:  Radiographs 05/25/2019.    FINDINGS:  Osseous mineralization is preserved.  No acute displaced fractures.  No suspicious lytic or blastic lesions.  Tibial plafond and talar dome are intact.  Ankle mortise is symmetric.  Tibiotalar, subtalar and midtarsal cartilage spaces are preserved.  No subluxation or dislocation.  Small distal Achilles enthesophyte.  Increased soft tissue attenuation at the level of Kager's fat pad, possibly related to an accessory soleus muscle.  Impression: No acute displaced fractures.    Radiographic findings suggestive of an accessory soleus muscle.    Electronically signed by: Ramu Stock MD  Date:    02/23/2024  Time:    08:58  X-ray Knee Ortho Right with Flexion  Narrative: EXAMINATION:  XR KNEE ORTHO RIGHT WITH FLEXION    CLINICAL HISTORY:  Prepatellar bursitis, right knee    TECHNIQUE:  AP standing as well as PA flexion standing and Merchant views of both knees were performed.  A lateral view of the right knee is also performed.    COMPARISON:  None.    FINDINGS:  Right knee:    No fracture.  Mild narrowing medial compartment without genu varum.  Preserved lateral and patellofemoral compartments.  No significant periarticular spurring.  Question small suprapatellar bursal effusion.  Obvious prepatellar and more side infrapatellar soft tissue swelling.   Clinical correlation.    Left knee:    No fracture.  Mild narrowing medial compartment without genu varum.  Preserved lateral and patellofemoral compartments.  No prepatellar soft tissue swelling.  Impression: As above    Electronically signed by: Brando Hollins  Date:    02/23/2024  Time:    08:56          Assessment/Plan     Aisha Muñoz is a 58 y.o. female with:  1. Poorly controlled type 2 diabetes mellitus with gastroparesis  Assessment & Plan:  Increase mounjaro 7.5mg SQ weekly; cont actos 45mg daily     Diet should be high in fiber with 30-35 gms daily, complex carbs, low saturated/trans fat diet,  Avoid fast foods, sweetened drinks, processed , white bread/pasta/rice/potatoes.  Hydate with 6-8 glasses of water daily.exercise 30 min 5 times per week          Orders:  -     Magnesium; Future; Expected date: 03/21/2024  -     Hemoglobin A1C; Future; Expected date: 03/21/2024    2. Nausea  -     ondansetron (ZOFRAN-ODT) 4 MG TbDL; Take 1 tablet (4 mg total) by mouth every 8 (eight) hours as needed (nausea).  Dispense: 90 tablet; Refill: 3    3. Type 2 diabetes mellitus with hyperglycemia, without long-term current use of insulin  -     Microalbumin/Creatinine Ratio, Urine; Future; Expected date: 03/21/2024  -     Urinalysis; Future; Expected date: 03/21/2024  -     BASIC METABOLIC PANEL; Future; Expected date: 03/21/2024    4. Primary hypertension  Assessment & Plan:  Koffi check BP when pt has labs drawn on Lisinopril 5mg daily       5. Mixed hyperlipidemia  Assessment & Plan:  Start livalo 4g and Zetia 10mg daily         6. Chronic pain of right ankle  Overview:  Radiographic findings suggestive of an accessory soleus muscle 2/2024       Assessment & Plan:  Cont percoct 10/325 as per Ortho PA Claire Palencia but make sure to eat and hydrate to avoid constipation - may need to take miralax and senna    Orders:  -     Ambulatory referral/consult to Orthopedics; Future; Expected date: 03/21/2024    7. Encounter for  preventive measure  Assessment & Plan:  Get COVID, flu and Shingrex  Repeat colon in 5/2024    Need eye and foot exams    Get routine labs and repeat BP       Other orders  -     tirzepatide 7.5 mg/0.5 mL PnIj; Inject 7.5 mg into the skin every 7 days.  Dispense: 1 Pen; Refill: 3             I spent a total of 32 minutes on the day of the visit.This includes face to face time and non-face to face time preparing to see the patient (eg, review of tests), obtaining and/or reviewing separately obtained history, documenting clinical information in the electronic or other health record, independently interpreting results and communicating results to the patient/family/caregiver, or care coordinator.   Code Status:     Tatianna Paulino MD

## 2024-03-22 NOTE — ASSESSMENT & PLAN NOTE
Cont percoct 10/325 as per Ortho PA Claire Palencia but make sure to eat and hydrate to avoid constipation - may need to take miralax and senna

## 2024-03-22 NOTE — ASSESSMENT & PLAN NOTE
Get COVID, flu and Shingrex  Repeat colon in 5/2024    Need eye and foot exams    Get routine labs and repeat BP

## 2024-03-22 NOTE — ASSESSMENT & PLAN NOTE
Cont pepcid 40mg daily  Start Zofran 4mg SL up to tid danica while on Mounjaro  Initiate GERD precautions ie lose weight, avoid eating 3 hours prior to bed, minimize caffeine, alcohol, tobacco, spicy, greasy, fatty foods; avoid peppermint chocolates, citrus and other acidic foods. Increase exercise to help with digestion and avoid lying down immediately after eating.  Elevate head of bed if GERD awakens pt from sleep.  Eat 6 small snacks rather than 3 large meals.

## 2024-03-22 NOTE — ASSESSMENT & PLAN NOTE
Increase mounjaro 7.5mg SQ weekly; cont actos 45mg daily     Diet should be high in fiber with 30-35 gms daily, complex carbs, low saturated/trans fat diet,  Avoid fast foods, sweetened drinks, processed , white bread/pasta/rice/potatoes.  Hydate with 6-8 glasses of water daily.exercise 30 min 5 times per week

## 2024-03-25 ENCOUNTER — PATIENT MESSAGE (OUTPATIENT)
Dept: ADMINISTRATIVE | Facility: OTHER | Age: 59
End: 2024-03-25
Payer: MEDICARE

## 2024-03-28 ENCOUNTER — PATIENT OUTREACH (OUTPATIENT)
Dept: ADMINISTRATIVE | Facility: HOSPITAL | Age: 59
End: 2024-03-28
Payer: MEDICARE

## 2024-04-01 ENCOUNTER — LAB VISIT (OUTPATIENT)
Dept: LAB | Facility: HOSPITAL | Age: 59
End: 2024-04-01
Attending: INTERNAL MEDICINE
Payer: MEDICARE

## 2024-04-01 DIAGNOSIS — E11.69 HYPERLIPIDEMIA ASSOCIATED WITH TYPE 2 DIABETES MELLITUS: ICD-10-CM

## 2024-04-01 DIAGNOSIS — M79.10 MYALGIA DUE TO STATIN: ICD-10-CM

## 2024-04-01 DIAGNOSIS — E11.65 TYPE 2 DIABETES MELLITUS WITH HYPERGLYCEMIA, WITHOUT LONG-TERM CURRENT USE OF INSULIN: ICD-10-CM

## 2024-04-01 DIAGNOSIS — T46.6X5A MYALGIA DUE TO STATIN: ICD-10-CM

## 2024-04-01 DIAGNOSIS — E11.65 POORLY CONTROLLED TYPE 2 DIABETES MELLITUS WITH GASTROPARESIS: ICD-10-CM

## 2024-04-01 DIAGNOSIS — E78.5 HYPERLIPIDEMIA ASSOCIATED WITH TYPE 2 DIABETES MELLITUS: ICD-10-CM

## 2024-04-01 DIAGNOSIS — E11.43 POORLY CONTROLLED TYPE 2 DIABETES MELLITUS WITH GASTROPARESIS: ICD-10-CM

## 2024-04-01 LAB
ANION GAP SERPL CALC-SCNC: 10 MMOL/L (ref 8–16)
BUN SERPL-MCNC: 13 MG/DL (ref 6–20)
CALCIUM SERPL-MCNC: 9.3 MG/DL (ref 8.7–10.5)
CHLORIDE SERPL-SCNC: 105 MMOL/L (ref 95–110)
CO2 SERPL-SCNC: 23 MMOL/L (ref 23–29)
CREAT SERPL-MCNC: 0.9 MG/DL (ref 0.5–1.4)
EST. GFR  (NO RACE VARIABLE): >60 ML/MIN/1.73 M^2
ESTIMATED AVG GLUCOSE: 200 MG/DL (ref 68–131)
GLUCOSE SERPL-MCNC: 205 MG/DL (ref 70–110)
HBA1C MFR BLD: 8.6 % (ref 4–5.6)
MAGNESIUM SERPL-MCNC: 1.4 MG/DL (ref 1.6–2.6)
POTASSIUM SERPL-SCNC: 4.3 MMOL/L (ref 3.5–5.1)
SODIUM SERPL-SCNC: 138 MMOL/L (ref 136–145)

## 2024-04-01 PROCEDURE — 80048 BASIC METABOLIC PNL TOTAL CA: CPT | Performed by: INTERNAL MEDICINE

## 2024-04-01 PROCEDURE — 83036 HEMOGLOBIN GLYCOSYLATED A1C: CPT | Performed by: INTERNAL MEDICINE

## 2024-04-01 PROCEDURE — 36415 COLL VENOUS BLD VENIPUNCTURE: CPT | Mod: PN | Performed by: INTERNAL MEDICINE

## 2024-04-01 PROCEDURE — 83735 ASSAY OF MAGNESIUM: CPT | Performed by: INTERNAL MEDICINE

## 2024-04-01 RX ORDER — PITAVASTATIN CALCIUM 4.18 MG/1
4 TABLET, FILM COATED ORAL DAILY
Qty: 90 TABLET | Refills: 3 | Status: SHIPPED | OUTPATIENT
Start: 2024-04-01 | End: 2025-04-01

## 2024-04-01 NOTE — TELEPHONE ENCOUNTER
----- Message from Tisha Waters sent at 4/1/2024  4:03 PM CDT -----  Regarding: Patient Requesting Medication  Good afternoon, patient is requesting her Cholesterol medication be refilled and sent to her preferred pharmacy due to her not having any. Please contact patient and assist, thanks.

## 2024-04-01 NOTE — TELEPHONE ENCOUNTER
Care Due:                  Date            Visit Type   Department     Provider  --------------------------------------------------------------------------------                                EP -                              PRIMARY      [facility]  Last Visit: 03-      CARE ([facility])   Care           Tatianna Paulino  Next Visit: None Scheduled  None         None Found                                                            Last  Test          Frequency    Reason                     Performed    Due Date  --------------------------------------------------------------------------------    HBA1C.......  6 months...  tirzepatide..............  12- 06-    [facility] Embedded Care Due Messages. Reference number: [id number].   4/01/2024 4:40:54 PM CDT

## 2024-04-02 ENCOUNTER — TELEPHONE (OUTPATIENT)
Dept: ENDOSCOPY | Facility: HOSPITAL | Age: 59
End: 2024-04-02

## 2024-04-02 ENCOUNTER — TELEPHONE (OUTPATIENT)
Dept: ENDOSCOPY | Facility: HOSPITAL | Age: 59
End: 2024-04-02
Payer: MEDICARE

## 2024-04-02 DIAGNOSIS — Z12.11 SPECIAL SCREENING FOR MALIGNANT NEOPLASMS, COLON: Primary | ICD-10-CM

## 2024-04-02 DIAGNOSIS — Z12.11 ENCOUNTER FOR SCREENING COLONOSCOPY: Primary | ICD-10-CM

## 2024-04-02 RX ORDER — POLYETHYLENE GLYCOL 3350, SODIUM SULFATE, POTASSIUM CHLORIDE, MAGNESIUM SULFATE, AND SODIUM CHLORIDE FOR ORAL SOLUTION 178.7-7.3G
1 KIT ORAL DAILY
Qty: 2 EACH | Refills: 0 | Status: CANCELLED | OUTPATIENT
Start: 2024-04-02 | End: 2024-04-04

## 2024-04-02 NOTE — TELEPHONE ENCOUNTER
"----- Message from Graciela Moser RN sent at 4/2/2024  7:26 AM CDT -----  Regarding: FW: Rescheduling Colonoscopy    ----- Message -----  From: Genie Dubois MA  Sent: 4/1/2024   4:11 PM CDT  To: [facility] Schedulers  Subject: FW: Rescheduling Colonoscopy                       ----- Message -----  From: Tisha Waters  Sent: 4/1/2024   4:03 PM CDT  To: [facility] Staff  Subject: Rescheduling Colonoscopy                         Good afternoon, patient is requesting to get her colonoscopy rescheduled. Patient stated she went in for her appt and was told to get rescheduled due to her not being "clean". Please contact patient and assist with rescheduling, thanks.        "

## 2024-04-02 NOTE — TELEPHONE ENCOUNTER
Spoke to patient to schedule procedure(s) Colonoscopy       Physician to perform procedure(s) Dr. MARIA GUADALUPE Wang  Date of Procedure (s) 05/24/2024  Arrival Time 9:15 Am   Time of Procedure(s) 10:15 Am    Location of Procedure(s) [facility] [address] Floor  Type of Rx Prep sent to patient: [facility]  Instructions provided to patient via [facility]    Patient was informed on the following information and verbalized understanding. Screening questionnaire reviewed with patient and complete. If procedure requires anesthesia, a responsible adult needs to be present to accompany the patient home, patient cannot drive after receiving anesthesia. Appointment details are tentative, especially check-in time. Patient will receive a prep-op call 7 days prior to confirm check-in time for procedure. If applicable the patient should contact their pharmacy to verify Rx for procedure prep is ready for pick-up. Patient was advised to call the scheduling department at [phone] if pharmacy states no Rx is available. Patient was advised to call the endoscopy scheduling department if any questions or concerns arise.      [facility] Scheduling Department

## 2024-04-07 ENCOUNTER — TELEPHONE (OUTPATIENT)
Dept: PRIMARY CARE CLINIC | Facility: CLINIC | Age: 59
End: 2024-04-07
Payer: MEDICARE

## 2024-04-09 ENCOUNTER — OFFICE VISIT (OUTPATIENT)
Dept: ORTHOPEDICS | Facility: CLINIC | Age: 59
End: 2024-04-09
Payer: MEDICARE

## 2024-04-09 VITALS — HEIGHT: 66 IN | WEIGHT: 182.31 LBS | BODY MASS INDEX: 29.3 KG/M2

## 2024-04-09 DIAGNOSIS — G89.29 CHRONIC PAIN OF RIGHT ANKLE: ICD-10-CM

## 2024-04-09 DIAGNOSIS — M25.571 CHRONIC PAIN OF RIGHT ANKLE: ICD-10-CM

## 2024-04-09 DIAGNOSIS — S86.001A INJURY OF RIGHT ACHILLES TENDON, INITIAL ENCOUNTER: Primary | ICD-10-CM

## 2024-04-09 PROCEDURE — 99999 PR PBB SHADOW E&M-EST. PATIENT-LVL IV: CPT | Mod: PBBFAC,,, | Performed by: PHYSICIAN ASSISTANT

## 2024-04-09 PROCEDURE — 3061F NEG MICROALBUMINURIA REV: CPT | Mod: CPTII,S$GLB,, | Performed by: PHYSICIAN ASSISTANT

## 2024-04-09 PROCEDURE — 99213 OFFICE O/P EST LOW 20 MIN: CPT | Mod: S$GLB,,, | Performed by: PHYSICIAN ASSISTANT

## 2024-04-09 PROCEDURE — 3066F NEPHROPATHY DOC TX: CPT | Mod: CPTII,S$GLB,, | Performed by: PHYSICIAN ASSISTANT

## 2024-04-09 PROCEDURE — 3052F HG A1C>EQUAL 8.0%<EQUAL 9.0%: CPT | Mod: CPTII,S$GLB,, | Performed by: PHYSICIAN ASSISTANT

## 2024-04-09 PROCEDURE — 3008F BODY MASS INDEX DOCD: CPT | Mod: CPTII,S$GLB,, | Performed by: PHYSICIAN ASSISTANT

## 2024-04-10 ENCOUNTER — TELEPHONE (OUTPATIENT)
Dept: ENDOSCOPY | Facility: HOSPITAL | Age: 59
End: 2024-04-10
Payer: MEDICARE

## 2024-04-18 ENCOUNTER — PATIENT OUTREACH (OUTPATIENT)
Dept: ADMINISTRATIVE | Facility: HOSPITAL | Age: 59
End: 2024-04-18
Payer: MEDICARE

## 2024-04-18 ENCOUNTER — PATIENT MESSAGE (OUTPATIENT)
Dept: ADMINISTRATIVE | Facility: HOSPITAL | Age: 59
End: 2024-04-18
Payer: MEDICARE

## 2024-04-26 ENCOUNTER — OFFICE VISIT (OUTPATIENT)
Dept: OBSTETRICS AND GYNECOLOGY | Facility: CLINIC | Age: 59
End: 2024-04-26
Payer: MEDICARE

## 2024-04-26 VITALS
BODY MASS INDEX: 29.2 KG/M2 | DIASTOLIC BLOOD PRESSURE: 84 MMHG | HEIGHT: 66 IN | SYSTOLIC BLOOD PRESSURE: 122 MMHG | WEIGHT: 181.69 LBS

## 2024-04-26 DIAGNOSIS — Z01.419 ENCOUNTER FOR GYNECOLOGICAL EXAMINATION WITHOUT ABNORMAL FINDING: Primary | ICD-10-CM

## 2024-04-26 DIAGNOSIS — Z90.710 S/P HYSTERECTOMY: ICD-10-CM

## 2024-04-26 DIAGNOSIS — Z00.00 PREVENTATIVE HEALTH CARE: ICD-10-CM

## 2024-04-26 DIAGNOSIS — Z12.31 BREAST CANCER SCREENING BY MAMMOGRAM: ICD-10-CM

## 2024-04-26 DIAGNOSIS — N95.2 VAGINAL ATROPHY: ICD-10-CM

## 2024-04-26 PROCEDURE — 3008F BODY MASS INDEX DOCD: CPT | Mod: CPTII,S$GLB,, | Performed by: OBSTETRICS & GYNECOLOGY

## 2024-04-26 PROCEDURE — 3079F DIAST BP 80-89 MM HG: CPT | Mod: CPTII,S$GLB,, | Performed by: OBSTETRICS & GYNECOLOGY

## 2024-04-26 PROCEDURE — 99386 PREV VISIT NEW AGE 40-64: CPT | Mod: S$GLB,,, | Performed by: OBSTETRICS & GYNECOLOGY

## 2024-04-26 PROCEDURE — 3061F NEG MICROALBUMINURIA REV: CPT | Mod: CPTII,S$GLB,, | Performed by: OBSTETRICS & GYNECOLOGY

## 2024-04-26 PROCEDURE — 99999 PR PBB SHADOW E&M-EST. PATIENT-LVL IV: CPT | Mod: PBBFAC,,, | Performed by: OBSTETRICS & GYNECOLOGY

## 2024-04-26 PROCEDURE — 1160F RVW MEDS BY RX/DR IN RCRD: CPT | Mod: CPTII,S$GLB,, | Performed by: OBSTETRICS & GYNECOLOGY

## 2024-04-26 PROCEDURE — 3052F HG A1C>EQUAL 8.0%<EQUAL 9.0%: CPT | Mod: CPTII,S$GLB,, | Performed by: OBSTETRICS & GYNECOLOGY

## 2024-04-26 PROCEDURE — 3074F SYST BP LT 130 MM HG: CPT | Mod: CPTII,S$GLB,, | Performed by: OBSTETRICS & GYNECOLOGY

## 2024-04-26 PROCEDURE — 3066F NEPHROPATHY DOC TX: CPT | Mod: CPTII,S$GLB,, | Performed by: OBSTETRICS & GYNECOLOGY

## 2024-04-26 PROCEDURE — 1159F MED LIST DOCD IN RCRD: CPT | Mod: CPTII,S$GLB,, | Performed by: OBSTETRICS & GYNECOLOGY

## 2024-04-26 RX ORDER — PANTOPRAZOLE SODIUM 40 MG/1
40 TABLET, DELAYED RELEASE ORAL DAILY
COMMUNITY

## 2024-04-26 RX ORDER — ESTRADIOL 0.1 MG/G
1 CREAM VAGINAL
Qty: 42.5 G | Refills: 3 | Status: SHIPPED | OUTPATIENT
Start: 2024-04-29 | End: 2024-05-31 | Stop reason: SDUPTHER

## 2024-04-26 NOTE — PROGRESS NOTES
"CC: Well woman exam    Aisha Muñoz is a 58 y.o. female  presents for a well woman exam.  No issues, problems, or complaints.    She has a HYST for DUB      Past Medical History:   Diagnosis Date    Acute midline low back pain with right-sided sciatica 2023    Acute viral syndrome 2023    Arthritis     Brain tumor (benign)     Chronic back pain     Diabetes mellitus     Gastroesophageal reflux disease without esophagitis 2023    Hypertension     Nausea 2024    Pain aggravated by activities of daily living 2023     Past Surgical History:   Procedure Laterality Date    BACK SURGERY      no hardware  fusion    BRAIN SURGERY      benign tumor removed from brain    carpal tunnel release      rosa m hands    HYSTERECTOMY      ovaries in situ     Family History   Problem Relation Name Age of Onset    Pancreatic cancer Maternal Grandmother      Pancreatic cancer Mother      Breast cancer Other cousin     Pancreatic cancer Maternal Aunt      Pancreatic cancer Maternal Aunt      Colon cancer Neg Hx      Ovarian cancer Neg Hx       Social History     Tobacco Use    Smoking status: Former     Current packs/day: 0.00     Types: Cigarettes     Quit date: 2013     Years since quitting: 10.4    Smokeless tobacco: Never   Substance Use Topics    Alcohol use: No    Drug use: No     OB History          3    Para   3    Term   3            AB        Living             SAB        IAB        Ectopic        Multiple        Live Births                     /84   Ht 5' 6" (1.676 m)   Wt 82.4 kg (181 lb 10.5 oz)   BMI 29.32 kg/m²     ROS:  GENERAL: Denies weight gain or weight loss. Feeling well overall.   SKIN: Denies rash or lesions.   HEAD: Denies head injury or headache.   NODES: Denies enlarged lymph nodes.   CHEST: Denies chest pain or shortness of breath.   CARDIOVASCULAR: Denies palpitations or left sided chest pain.   ABDOMEN: No abdominal pain, constipation, " diarrhea, nausea, vomiting or rectal bleeding.   URINARY: No frequency, dysuria, hematuria, or burning on urination.  REPRODUCTIVE: See HPI.   BREASTS: The patient performs breast self-examination and denies pain, lumps, or nipple discharge.   HEMATOLOGIC: No easy bruisability or excessive bleeding.   MUSCULOSKELETAL: Denies joint pain or swelling.   NEUROLOGIC: Denies syncope or weakness.   PSYCHIATRIC: Denies depression, anxiety or mood swings.    PE:   APPEARANCE: Well nourished, well developed, in no acute distress.  AFFECT: WNL, alert and oriented x 3.  SKIN: No acne or hirsutism.  NECK: Neck symmetric without masses or thyromegaly.  NODES: No inguinal, cervical, axillary or femoral lymph node enlargement.  CHEST: Good respiratory effort.   ABDOMEN: Soft. No tenderness or masses. No hepatosplenomegaly. No hernias.  BREASTS: Symmetrical, no skin changes or visible lesions. No palpable masses, nipple discharge bilaterally.  PELVIC: Normal external female genitalia without lesions. Normal hair distribution. Adequate perineal body, normal urethral meatus. Vagina atrophic without lesions or discharge. No significant cystocele or rectocele. Bimanual exam shows uterus and cervix to be surgically absent. Adnexa without masses or tenderness.  RECTAL: Rectovaginal exam confirms above with normal sphincter tone, no masses.  EXTREMITIES: No edema.    Diagnosis      ICD-10-CM ICD-9-CM    1. Encounter for gynecological examination without abnormal finding  Z01.419 V72.31       2. Preventative health care  Z00.00 V70.0 Ambulatory referral/consult to Gynecology      3. S/P hysterectomy  Z90.710 V88.01 estradioL (ESTRACE) 0.01 % (0.1 mg/gram) vaginal cream      4. Vaginal atrophy  N95.2 627.3 estradioL (ESTRACE) 0.01 % (0.1 mg/gram) vaginal cream      5. Breast cancer screening by mammogram  Z12.31 V76.12 Mammo Digital Screening Bilat w/ Kiran              Patient was counseled today on A.C.S. Pap guidelines and recommendations  for yearly pelvic exams, mammograms and monthly self breast exams; to see her PCP for other health maintenance.     Follow up in about 1 year (around 4/26/2025), or if symptoms worsen or fail to improve.

## 2024-05-06 ENCOUNTER — TELEPHONE (OUTPATIENT)
Dept: PRIMARY CARE CLINIC | Facility: CLINIC | Age: 59
End: 2024-05-06
Payer: MEDICARE

## 2024-05-06 NOTE — TELEPHONE ENCOUNTER
----- Message from Raul Brantley sent at 5/6/2024 12:32 PM CDT -----  Regarding: Meds for Colon  Contact: Aisha [phone]  [email or web address]     Patient is calling for Medical Advice regarding: Medication for upcoming colonoscopy     How long has patient had these symptoms:    Pharmacy name and phone#:    Would like response via [employer]:  ##    Comments:

## 2024-05-08 ENCOUNTER — PATIENT MESSAGE (OUTPATIENT)
Dept: ENDOSCOPY | Facility: HOSPITAL | Age: 59
End: 2024-05-08
Payer: MEDICARE

## 2024-05-08 ENCOUNTER — TELEPHONE (OUTPATIENT)
Dept: ENDOSCOPY | Facility: HOSPITAL | Age: 59
End: 2024-05-08
Payer: MEDICARE

## 2024-05-08 DIAGNOSIS — Z12.11 SCREENING FOR MALIGNANT NEOPLASM OF COLON: Primary | ICD-10-CM

## 2024-05-08 RX ORDER — POLYETHYLENE GLYCOL 3350, SODIUM SULFATE, POTASSIUM CHLORIDE, MAGNESIUM SULFATE, AND SODIUM CHLORIDE FOR ORAL SOLUTION 178.7-7.3G
1 KIT ORAL DAILY
Qty: 2 EACH | Refills: 0 | Status: SHIPPED | OUTPATIENT
Start: 2024-05-08 | End: 2024-05-10

## 2024-05-17 ENCOUNTER — TELEPHONE (OUTPATIENT)
Dept: ENDOSCOPY | Facility: HOSPITAL | Age: 59
End: 2024-05-17
Payer: MEDICARE

## 2024-05-17 NOTE — TELEPHONE ENCOUNTER
Yris for colonoscopy scheduled on 5/24/24.  Confirmed new arrival time of 745am.  Updated instructions via portal.  Pt confirmed she is holding her Mounjaro.

## 2024-05-22 ENCOUNTER — ANESTHESIA EVENT (OUTPATIENT)
Dept: ENDOSCOPY | Facility: HOSPITAL | Age: 59
End: 2024-05-22
Payer: MEDICARE

## 2024-05-22 ENCOUNTER — TELEPHONE (OUTPATIENT)
Dept: ENDOSCOPY | Facility: HOSPITAL | Age: 59
End: 2024-05-22
Payer: MEDICARE

## 2024-05-22 NOTE — TELEPHONE ENCOUNTER
Received message from Eula Maria RN at , per anesthesia, Pt to be rescheduled for colonoscopy on 2nd floor at . Pt contacted and was rescheduled.   Spoke to Pt to reschedule procedure(s) Colonoscopy       Physician to perform procedure(s) Dr. HCIQUITA Luna  Date of Procedure (s) 5/24/24  Arrival Time 12:15 PM  Time of Procedure(s) 1:15 PM   Location of Procedure(s) [facility] 2nd Floor  Type of Rx Prep sent to patient: [facility]  Instructions provided to patient via [email or web address] and email    Patient was informed on the following information and verbalized understanding. Screening questionnaire reviewed with patient and complete. If procedure requires anesthesia, a responsible adult needs to be present to accompany the patient home, patient cannot drive after receiving anesthesia. Appointment details are tentative, especially check-in time. Patient will receive a prep-op call 7 days prior to confirm check-in time for procedure. If applicable the patient should contact their pharmacy to verify Rx for procedure prep is ready for pick-up. Patient was advised to call the scheduling department at [phone] if pharmacy states no Rx is available. Patient was advised to call the endoscopy scheduling department if any questions or concerns arise.       Endoscopy Scheduling Department

## 2024-05-24 ENCOUNTER — HOSPITAL ENCOUNTER (OUTPATIENT)
Facility: HOSPITAL | Age: 59
Discharge: HOME | End: 2024-05-24
Attending: INTERNAL MEDICINE | Admitting: INTERNAL MEDICINE
Payer: MEDICARE

## 2024-05-24 ENCOUNTER — ANESTHESIA (OUTPATIENT)
Dept: ENDOSCOPY | Facility: HOSPITAL | Age: 59
End: 2024-05-24
Payer: MEDICARE

## 2024-05-24 ENCOUNTER — TELEPHONE (OUTPATIENT)
Dept: ENDOSCOPY | Facility: HOSPITAL | Age: 59
End: 2024-05-24
Payer: MEDICARE

## 2024-05-24 VITALS
HEART RATE: 65 BPM | RESPIRATION RATE: 20 BRPM | DIASTOLIC BLOOD PRESSURE: 73 MMHG | OXYGEN SATURATION: 96 % | HEIGHT: 66 IN | TEMPERATURE: 97 F | BODY MASS INDEX: 29.25 KG/M2 | SYSTOLIC BLOOD PRESSURE: 120 MMHG | WEIGHT: 182 LBS

## 2024-05-24 DIAGNOSIS — Z12.11 SCREEN FOR COLON CANCER: ICD-10-CM

## 2024-05-24 DIAGNOSIS — Z80.0 FAMILY HISTORY OF PANCREATIC CANCER: Primary | ICD-10-CM

## 2024-05-24 LAB
GLUCOSE SERPL-MCNC: 149 MG/DL (ref 70–110)
POCT GLUCOSE: 145 MG/DL (ref 70–110)
POCT GLUCOSE: 149 MG/DL (ref 70–110)

## 2024-05-24 PROCEDURE — 25000003 PHARM REV CODE 250: Performed by: NURSE ANESTHETIST, CERTIFIED REGISTERED

## 2024-05-24 PROCEDURE — D9220A PRA ANESTHESIA: Mod: CRNA,,, | Performed by: NURSE ANESTHETIST, CERTIFIED REGISTERED

## 2024-05-24 PROCEDURE — 37000008 HC ANESTHESIA 1ST 15 MINUTES: Performed by: INTERNAL MEDICINE

## 2024-05-24 PROCEDURE — 63600175 PHARM REV CODE 636 W HCPCS: Performed by: NURSE ANESTHETIST, CERTIFIED REGISTERED

## 2024-05-24 PROCEDURE — G0121 COLON CA SCRN NOT HI RSK IND: HCPCS | Mod: ,,, | Performed by: INTERNAL MEDICINE

## 2024-05-24 PROCEDURE — 82962 GLUCOSE BLOOD TEST: CPT | Performed by: INTERNAL MEDICINE

## 2024-05-24 PROCEDURE — 37000009 HC ANESTHESIA EA ADD 15 MINS: Performed by: INTERNAL MEDICINE

## 2024-05-24 PROCEDURE — 45378 DIAGNOSTIC COLONOSCOPY: CPT | Performed by: INTERNAL MEDICINE

## 2024-05-24 PROCEDURE — 25000003 PHARM REV CODE 250: Performed by: INTERNAL MEDICINE

## 2024-05-24 PROCEDURE — D9220A PRA ANESTHESIA: Mod: ANES,,, | Performed by: ANESTHESIOLOGY

## 2024-05-24 RX ORDER — ONDANSETRON HYDROCHLORIDE 2 MG/ML
INJECTION, SOLUTION INTRAVENOUS
Status: DISCONTINUED | OUTPATIENT
Start: 2024-05-24 | End: 2024-05-24

## 2024-05-24 RX ORDER — SODIUM CHLORIDE 9 MG/ML
INJECTION, SOLUTION INTRAVENOUS CONTINUOUS
Status: DISCONTINUED | OUTPATIENT
Start: 2024-05-24 | End: 2024-05-24 | Stop reason: HOSPADM

## 2024-05-24 RX ORDER — PROPOFOL 10 MG/ML
VIAL (ML) INTRAVENOUS
Status: DISCONTINUED | OUTPATIENT
Start: 2024-05-24 | End: 2024-05-24

## 2024-05-24 RX ORDER — LIDOCAINE HYDROCHLORIDE 20 MG/ML
INJECTION INTRAVENOUS
Status: DISCONTINUED | OUTPATIENT
Start: 2024-05-24 | End: 2024-05-24

## 2024-05-24 RX ORDER — FENTANYL CITRATE 50 UG/ML
INJECTION, SOLUTION INTRAMUSCULAR; INTRAVENOUS
Status: DISCONTINUED | OUTPATIENT
Start: 2024-05-24 | End: 2024-05-24

## 2024-05-24 RX ADMIN — Medication 50 MG: at 02:05

## 2024-05-24 RX ADMIN — FENTANYL CITRATE 50 MCG: 50 INJECTION, SOLUTION INTRAMUSCULAR; INTRAVENOUS at 03:05

## 2024-05-24 RX ADMIN — ONDANSETRON 4 MG: 2 INJECTION INTRAMUSCULAR; INTRAVENOUS at 03:05

## 2024-05-24 RX ADMIN — LIDOCAINE HYDROCHLORIDE 80 MG: 20 INJECTION INTRAVENOUS at 02:05

## 2024-05-24 RX ADMIN — SODIUM CHLORIDE: 0.9 INJECTION, SOLUTION INTRAVENOUS at 02:05

## 2024-05-24 NOTE — H&P
[facility]  History & Physical    Subjective:      Chief Complaint/Reason for Admission:     Direct access screening colonoscopy patient is at average risk for CR by personal and family history today.    Aisha Muñoz is a 58 y.o. female.    Past Medical History:   Diagnosis Date    Acute midline low back pain with right-sided sciatica 06/07/2023    Acute viral syndrome 12/07/2023    Arthritis     Brain tumor (benign) 1997    Chronic back pain     Diabetes mellitus     Gastroesophageal reflux disease without esophagitis 08/25/2023    Hypertension     Nausea 03/21/2024    Pain aggravated by activities of daily living 06/27/2023     Past Surgical History:   Procedure Laterality Date    BACK SURGERY      no hardware  fusion    BRAIN SURGERY  1997    benign tumor removed from brain    carpal tunnel release      rosa m hands    HYSTERECTOMY      ovaries in situ     Social History     Tobacco Use    Smoking status: Former     Current packs/day: 0.00     Types: Cigarettes     Quit date: 11/8/2013     Years since quitting: 10.5    Smokeless tobacco: Never   Substance Use Topics    Alcohol use: Yes     Comment: occasionally    Drug use: No       PTA Medications   Medication Sig    lisinopril (PRINIVIL,ZESTRIL) 5 MG tablet Take 5 mg by mouth once daily.    oxyCODONE-acetaminophen (PERCOCET)  mg per tablet Take 1 tablet by mouth 3 (three) times daily as needed.    pantoprazole (PROTONIX) 40 MG tablet Take 40 mg by mouth once daily.    pioglitazone (ACTOS) 45 MG tablet Take 45 mg by mouth once daily.    acetaminophen (TYLENOL) 325 MG tablet Take 325 mg by mouth as needed for Pain.    albuterol (VENTOLIN HFA) 90 mcg/actuation inhaler Inhale 2 puffs into the lungs every 4 (four) hours as needed for Wheezing. Rescue    diclofenac sodium (VOLTAREN) 1 % Gel 4 grams to effected area 4 times daily do not exceed 32 grams    EPINEPHrine (EPIPEN) 0.3 mg/0.3 mL AtIn Inject into the muscle.    estradioL (ESTRACE) 0.01 %  (0.1 mg/gram) vaginal cream Place 1 g vaginally twice a week.    fluticasone propionate (FLONASE) 50 mcg/actuation nasal spray 1 spray (50 mcg total) by Each Nostril route 2 (two) times daily as needed for Rhinitis.    LIDOcaine (LIDODERM) 5 % SMARTSI-2 Patch(s) Topical Daily PRN    ondansetron (ZOFRAN-ODT) 4 MG TbDL Take 1 tablet (4 mg total) by mouth every 8 (eight) hours as needed (nausea).    pitavastatin calcium (LIVALO) 4 mg Tab Take 4 mg by mouth once daily.    tirzepatide 7.5 mg/0.5 mL PnIj Inject 7.5 mg into the skin every 7 days.     Review of patient's allergies indicates:   Allergen Reactions    Fish containing products Shortness Of Breath, Swelling and Rash    Grape Itching    Strawberries [strawberry] Itching    Banana Itching    Lyrica [pregabalin] Other (See Comments)     headache    Pineapple Hives and Itching        Review of Systems   Constitutional:  Negative for fever.       Objective:      Vital Signs (Most Recent)  Temp: 97.7 °F (36.5 °C) (24 1318)  Pulse: 75 (24 1318)  Resp: 17 (24 1318)  BP: 127/82 (24 1318)  SpO2: 100 % (24 1318)    Vital Signs Range (Last 24H):  Temp:  [97.7 °F (36.5 °C)]   Pulse:  [75]   Resp:  [17]   BP: (127)/(82)   SpO2:  [100 %]     Physical Exam  Cardiovascular:      Rate and Rhythm: Normal rate.   Pulmonary:      Effort: Pulmonary effort is normal.   Neurological:      Mental Status: She is alert and oriented to person, place, and time.           Assessment:      Direct access screening colonoscopy patient is at average risk for CR by personal and family history today.      Plan:      Direct access screening colonoscopy patient is at average risk for CR by personal and family history today.

## 2024-05-24 NOTE — PLAN OF CARE
..Pt alert and orientated. Spouse at bs. VSS. No distress noted. Pt denies N/V/D. Pt states they are ready for discharge.

## 2024-05-24 NOTE — ANESTHESIA PREPROCEDURE EVALUATION
05/24/2024  Aisha Muñoz is a 58 y.o., female.  Pre-operative evaluation for Procedure(s) (LRB):  COLONOSCOPY (N/A)    Aisha Muñoz is a 58 y.o. 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     Current Outpatient Medications on File Prior to Encounter   Medication Sig Dispense Refill    lisinopril (PRINIVIL,ZESTRIL) 5 MG tablet Take 5 mg by mouth once daily.      oxyCODONE-acetaminophen (PERCOCET)  mg per tablet Take 1 tablet by mouth 3 (three) times daily as needed. 90 tablet 0    pioglitazone (ACTOS) 45 MG tablet Take 45 mg by mouth once daily.      acetaminophen (TYLENOL) 325 MG tablet Take 325 mg by mouth as needed for Pain.      albuterol (VENTOLIN HFA) 90 mcg/actuation inhaler Inhale 2 puffs into the lungs every 4 (four) hours as needed for Wheezing. Rescue 18 g 1    diclofenac sodium (VOLTAREN) 1 % Gel 4 grams to effected area 4 times daily do not exceed 32 grams 1 each 0    EPINEPHrine (EPIPEN) 0.3 mg/0.3 mL AtIn Inject into the muscle.      fluticasone propionate (FLONASE) 50 mcg/actuation nasal spray 1 spray (50 mcg total) by  Each Nostril route 2 (two) times daily as needed for Rhinitis. 16 g 3    LIDOcaine (LIDODERM) 5 % SMARTSI-2 Patch(s) Topical Daily PRN      ondansetron (ZOFRAN-ODT) 4 MG TbDL Take 1 tablet (4 mg total) by mouth every 8 (eight) hours as needed (nausea). 90 tablet 3    pitavastatin calcium (LIVALO) 4 mg Tab Take 4 mg by mouth once daily. 90 tablet 3    tirzepatide 7.5 mg/0.5 mL PnIj Inject 7.5 mg into the skin every 7 days. 1 Pen 3       Past Surgical History:   Procedure Laterality Date    BACK SURGERY      no hardware  fusion    BRAIN SURGERY  1997    benign tumor removed from brain    carpal tunnel release      rosa m hands    HYSTERECTOMY      ovaries in situ             Pre-op Assessment    I have reviewed the Patient Summary Reports.     I have reviewed the Nursing Notes. I have reviewed the NPO Status.   I have reviewed the Medications.     Review of Systems  Anesthesia Hx:  No problems with previous Anesthesia                Cardiovascular:     Hypertension   Denies MI.                                         Pulmonary:     Denies Asthma.                    Hepatic/GI:     GERD             Neurological:  Denies TIA.     Denies Seizures.                                Endocrine:  Diabetes               Physical Exam  General: Cooperative    Airway:  Mallampati: II   Mouth Opening: Normal  TM Distance: Normal  Tongue: Normal  Neck ROM: Normal ROM    Dental:  Partial Dentures    Anesthesia Plan  Type of Anesthesia, risks & benefits discussed:    Anesthesia Type: Gen Natural Airway, Gen ETT  Intra-op Monitoring Plan: Standard ASA Monitors  Induction:  IV  Informed Consent: Informed consent signed with the Patient and all parties understand the risks and agree with anesthesia plan.  All questions answered.   ASA Score: 2  Day of Surgery Review of History & Physical: H&P Update referred to the surgeon/provider.    Ready For Surgery From Anesthesia Perspective.   .

## 2024-05-24 NOTE — TRANSFER OF CARE
"Anesthesia Transfer of Care Note    Patient: Aisha Muñoz    Procedure(s) Performed: Procedure(s) (LRB):  COLONOSCOPY (N/A)    Patient location: [facility]    Anesthesia Type: general    Transport from OR: Transported from OR on room air with adequate spontaneous ventilation    Post pain: adequate analgesia    Post assessment: no apparent anesthetic complications and tolerated procedure well    Post vital signs: stable    Level of consciousness: awake, alert and oriented    Nausea/Vomiting: no nausea/vomiting    Complications: none    Transfer of care protocol was followed    Last vitals: Visit Vitals  /82 (BP Location: Left arm, Patient Position: Lying)   Pulse 75   Temp 36.5 °C (97.7 °F) (Temporal)   Resp 17   Ht 5' 6" (1.676 m)   Wt 82.6 kg (182 lb)   SpO2 100%   Breastfeeding No   BMI 29.38 kg/m²     "

## 2024-05-24 NOTE — [employer]ATION PATIENT INSTRUCTIONS
Discharge Summary/Instructions after an Endoscopic Procedure  Patient Name: Aisha Ramos  Patient MRN: [id number]  Patient YOB: 1965  Friday, May 24, 2024  Alden Luna MD  Dear patient,  As a result of recent federal legislation (The Federal Cures Act), you may   receive lab or pathology results from your procedure in your [facility]   account before your physician is able to contact you. Your physician or   their representative will relay the results to you with their   recommendations at their soonest availability.  Thank you,  RESTRICTIONS:  During your procedure today, you received medications for sedation.  These   medications may affect your judgment, balance and coordination.  Therefore,   for 24 hours, you have the following restrictions:   - DO NOT drive a car, operate machinery, make legal/financial decisions,   sign important papers or drink alcohol.    ACTIVITY:  Today: no heavy lifting, straining or running due to procedural   sedation/anesthesia.  The following day: return to full activity including work.  DIET:  Eat and drink normally unless instructed otherwise.     TREATMENT FOR COMMON SIDE EFFECTS:  - Mild abdominal pain, nausea, belching, bloating or excessive gas:  rest,   eat lightly and use a heating pad.  - Sore Throat: treat with throat lozenges and/or gargle with warm salt   water.  - Because air was used during the procedure, expelling large amounts of air   from your rectum or belching is normal.  - If a bowel prep was taken, you may not have a bowel movement for 1-3 days.    This is normal.  SYMPTOMS TO WATCH FOR AND REPORT TO YOUR PHYSICIAN:  1. Abdominal pain or bloating, other than gas cramps.  2. Chest pain.  3. Back pain.  4. Signs of infection such as: chills or fever occurring within 24 hours   after the procedure.  5. Rectal bleeding, which would show as bright red, maroon, or black stools.   (A tablespoon of blood from the rectum is not serious, especially if    hemorrhoids are present.)  6. Vomiting.  7. Weakness or dizziness.  GO DIRECTLY TO THE NEAREST EMERGENCY ROOM IF YOU HAVE ANY OF THE FOLLOWING:      Difficulty breathing              Chills and/or fever over 101 F   Persistent vomiting and/or vomiting blood   Severe abdominal pain   Severe chest pain   Black, tarry stools   Bleeding- more than one tablespoon   Any other symptom or condition that you feel may need urgent attention  Your doctor recommends these additional instructions:  If any biopsies were taken, your doctors clinic will contact you in 1 to 2   weeks with any results.  - Discharge patient to home.   - Repeat colonoscopy in 10 years for screening purposes.   - THIS RECOMMENDATION of 10-Years FOR NEXT SCREENING COLONOSCOPY ASSUMES   THAT YOU WILL NOT HAVE HAD OR NOT HAD A FIRST OR SECOND DEGREE RELATIVE/S   WITH COLORECTAL CANCER OR AN ADVANCE COLON ADENOMAS BEFORE THE AGE OF 60   YEARS OF AGE OF THOSE INDIVIDUALS BY THE TIME OF YOUR NEXT SCREENING   COLONOSCOPY.   ALSO YOU CAN CONSIDER TO START IN 5-YEARS FROM TODAY TO GET Annual fecal   occult blood testing with the (FIT stool for occult blood test) with your   primary care PROVIDER.  PLEASE CONTACT YOUR PRIMARY CARE PROVIDER TO   SCHEDULE THIS.                - Refer to a genetics counselor at the next available appointment.   - Return to primary care physician.   - The findings and recommendations were discussed with the patient.  For questions, problems or results please call your physician - Alden Luna MD at Work:  [phone].  [facility], EMERGENCY ROOM PHONE NUMBER: [phone]  IF A COMPLICATION OR EMERGENCY SITUATION ARISES AND YOU ARE UNABLE TO REACH   YOUR PHYSICIAN - GO DIRECTLY TO THE EMERGENCY ROOM.  Alden Luna MD  5/24/2024 3:42:24 PM  This report has been verified and signed electronically.  Dear patient,  As a result of recent federal legislation (The Federal Cures Act), you may   receive lab or  pathology results from your procedure in your [facility]   account before your physician is able to contact you. Your physician or   their representative will relay the results to you with their   recommendations at their soonest availability.  Thank you,  [employer]OVATION

## 2024-05-24 NOTE — PROGRESS NOTES
"Subjective     Patient ID: Aisha Muñoz is a 58 y.o. female.    Chief Complaint: Follow-up (Right ankle)    HPI    Patient is a 58 year old female who presents to clinic with chief complaint of right posterior ankle pain. Pain is increased when getting up after time of rest. Has tried voltern and boot with mild relief.     Lab Results   Component Value Date    HGBA1C 8.6 (H) 04/01/2024         Estimated body mass index is 29.43 kg/m² as calculated from the following:    Height as of this encounter: 5' 6" (1.676 m).    Weight as of this encounter: 82.7 kg (182 lb 5.1 oz).        Review of Systems   Constitutional: Negative for chills and fever.   Cardiovascular:  Negative for chest pain.   Respiratory:  Negative for cough and shortness of breath.    Skin:  Negative for color change, dry skin, itching, nail changes, poor wound healing and rash.   Musculoskeletal:         Right ankle pain   Neurological:  Negative for dizziness.   Psychiatric/Behavioral:  Negative for altered mental status. The patient is not nervous/anxious.    All other systems reviewed and are negative.         Objective     General    Constitutional: She is oriented to person, place, and time. She appears well-developed and well-nourished. No distress.   HENT:   Head: Atraumatic.   Eyes: Conjunctivae are normal.   Cardiovascular:  Normal rate.            Pulmonary/Chest: Effort normal.   Neurological: She is alert and oriented to person, place, and time.   Psychiatric: She has a normal mood and affect. Her behavior is normal.         Right Ankle/Foot Exam     Tenderness   The patient is tender to palpation of the Achilles tendon.    Range of Motion   The patient has normal right ankle ROM.          Physical Exam  Constitutional:       General: She is not in acute distress.     Appearance: She is well-developed and well-nourished. She is not diaphoretic.   HENT:      Head: Atraumatic.   Eyes:      Conjunctiva/sclera: Conjunctivae normal. "   Cardiovascular:      Rate and Rhythm: Normal rate.   Pulmonary:      Effort: Pulmonary effort is normal.   Musculoskeletal:      Right ankle:      Right Achilles Tendon: Tenderness present.   Neurological:      Mental Status: She is alert and oriented to person, place, and time.   Psychiatric:         Mood and Affect: Mood and affect normal.         Behavior: Behavior normal.          RADS: reviewed by myself:     Osseous mineralization is preserved. No acute displaced fractures. No suspicious lytic or blastic lesions. Tibial plafond and talar dome are intact. Ankle mortise is symmetric. Tibiotalar, subtalar and midtarsal cartilage spaces are preserved. No subluxation or dislocation. Small distal Achilles enthesophyte. Increased soft tissue attenuation at the level of Kager's fat pad, possibly related to an accessory soleus muscle.   Encounter Diagnoses   Name Primary?    Chronic pain of right ankle     Injury of right Achilles tendon, initial encounter Yes       Discussed plan with the patient. At time time will trat with [facility] and PT. Patient is to return to clinic as needed.

## 2024-05-24 NOTE — ANESTHESIA POSTPROCEDURE EVALUATION
Anesthesia Post Evaluation    Patient: Aisha Muñoz    Procedure(s) Performed: Procedure(s) (LRB):  COLONOSCOPY (N/A)    Final Anesthesia Type: general      Patient location during evaluation: PACU  Patient participation: No - Unable to Participate, Sedation  Level of consciousness: sedated  Post-procedure vital signs: reviewed and stable  Pain management: adequate  Airway patency: patent    PONV status at discharge: No PONV  Anesthetic complications: no      Cardiovascular status: hemodynamically stable  Respiratory status: unassisted and spontaneous ventilation  Hydration status: euvolemic  Follow-up not needed.          Vitals Value Taken Time   /63 05/24/24 1602   Temp 36.5 °C (97.7 °F) 05/24/24 1535   Pulse 65 05/24/24 1615   Resp 42 05/24/24 1613   SpO2 100 % 05/24/24 1615   Vitals shown include unfiled device data.      No case tracking events are documented in the log.      Pain/Pau Score: Pau Score: 10 (5/24/2024  4:00 PM)

## 2024-05-28 ENCOUNTER — PATIENT MESSAGE (OUTPATIENT)
Dept: GASTROENTEROLOGY | Facility: CLINIC | Age: 59
End: 2024-05-28
Payer: MEDICARE

## 2024-05-28 ENCOUNTER — OFFICE VISIT (OUTPATIENT)
Dept: HEMATOLOGY/ONCOLOGY | Facility: CLINIC | Age: 59
End: 2024-05-28
Payer: MEDICARE

## 2024-05-28 ENCOUNTER — LAB VISIT (OUTPATIENT)
Dept: LAB | Facility: HOSPITAL | Age: 59
End: 2024-05-28
Payer: MEDICARE

## 2024-05-28 DIAGNOSIS — Z71.83 ENCOUNTER FOR NONPROCREATIVE GENETIC COUNSELING: Primary | ICD-10-CM

## 2024-05-28 DIAGNOSIS — Z80.0 FAMILY HISTORY OF PANCREATIC CANCER: ICD-10-CM

## 2024-05-28 PROCEDURE — 3061F NEG MICROALBUMINURIA REV: CPT | Mod: CPTII,S$GLB,, | Performed by: PHYSICIAN ASSISTANT

## 2024-05-28 PROCEDURE — 3052F HG A1C>EQUAL 8.0%<EQUAL 9.0%: CPT | Mod: CPTII,S$GLB,, | Performed by: PHYSICIAN ASSISTANT

## 2024-05-28 PROCEDURE — 36415 COLL VENOUS BLD VENIPUNCTURE: CPT | Performed by: PHYSICIAN ASSISTANT

## 2024-05-28 PROCEDURE — 99205 OFFICE O/P NEW HI 60 MIN: CPT | Mod: S$GLB,,, | Performed by: PHYSICIAN ASSISTANT

## 2024-05-28 PROCEDURE — 99999 PR PBB SHADOW E&M-EST. PATIENT-LVL II: CPT | Mod: PBBFAC,,, | Performed by: PHYSICIAN ASSISTANT

## 2024-05-28 PROCEDURE — 3066F NEPHROPATHY DOC TX: CPT | Mod: CPTII,S$GLB,, | Performed by: PHYSICIAN ASSISTANT

## 2024-05-28 NOTE — LETTER
"   Aisha Ricejulius Muñoz was seen in our cancer center for an appointment on 5/28/2024.  She may return to work on 5/28/2024 with no restrictions.     If you have any questions or concerns, please don't hesitate to call.      Best regards,   Janis Arce PA-C  Cancer Genetics  Hereditary/High Risk Clinic  Department of Hematology and Oncology  [facility]    "

## 2024-05-28 NOTE — PROGRESS NOTES
Hereditary/High Risk Clinic  Department of Hematology and Oncology  [facility]    Cancer Genetics  Initial Consultation    Date of Service:  24  Visit Provider:  Janis Arce PA-C  Collaborating Physician:  Richa Dutta MD    Patient:  Aisha Muñoz  :  1965  MRN:   [id number]     Referring Provider    Alden Luna MD  [address] [place] [address]    ASSESSMENT   Aisha Muñoz, 58 y.o., assigned female sex at birth, with a personal/family history significant for the following:   Aisha: No cancers. Benign brain tumor resected at age 31.   Mother: Cirrhosis (heavy drinker), leukemia,  55  MGM: Cirrhosis (heavy drinker),  56   Maternal aunt: Cirrhosis (heavy drinker), pancreatic cancer early 60s,    Maternal first cousin once removed: Pancreatic cancer,     Based on the information provided by Aisha, her personal and/or family history is slightly suggestive of a potential hereditary predisposition to cancer and meets current clinical guidelines for genetic testing.  The recommendation is to proceed with germline testing to include the genes commonly associated with hereditary pancreatic cancer (APC, CHANA, BRCA1, BRCA2, CDKN2A, EPCAM, MLH1, MSH2, MSH6, PALB2, PMS2, STK11, TP53). Aisha was given the option of proceeding with testing now, deferring testing to a later date, or declining testing and opted to proceed.     PLAN   Proceed with germline genetic testing as noted below.  Cancer risk-reduction strategies  Follow up in about 3 weeks (around 2024) for results review, post-test genetic counseling.    Genetics lab: [facility]   [id number]    Genetic test: [facility]   Indication:  FH pancreatic cancer   Specimen type: Blood   [facility]    Specimen collection date: 24   Results expected by: Approximately 3 weeks after the genetic testing lab receives the specimen   Results  disclosure plan: Notification by genetics team and post-test visit    Verbal informed consent: Obtained       Visit Diagnosis:   1. Encounter for nonprocreative genetic counseling    2. Family history of pancreatic cancer  - Ambulatory referral/consult to Genetics  - Genetic Misc Sendout Test, Blood; Future      Questions were encouraged and answered to the patient's satisfaction, and she verbalized understanding of information and agreement with the plan.       Approximately 35 minutes were spent face-to-face with the patient.  Approximately 60 minutes in total were spent on this encounter, which includes face-to-face time and non-face-to-face time preparing to see the patient (e.g., review of tests), obtaining and/or reviewing separately obtained history, documenting clinical information in the electronic or other health record, independently interpreting results (not separately reported) and communicating results to the patient/family/caregiver, or care coordination (not separately reported).     SUBJECTIVE   Chief Complaint: Genetic evaluation  History of Present Illness (HPI):  Aisha uMñoz is new to the [facility] Department of Hematology and Oncology and to me.  She presents for genetic evaluation due to her personal/family history of cancer.      Genetic Assessment History:  Germline cancer-genetic testing: no  Personal history of cancer:  no  Benign tumors:  yes, brain tumor at age 31  Pancreatitis:  no  Chemoprevention: Never used  Uterus and ovaries intact:  no, s/p hysterectomy; ovaries intact    Cancer Screening:   Colonoscopy: 5/2024 normal, repeat 10 years. (Dr. Luna)  Well woman exam: 4/2024  Mammogram: 9/2023 negative, average density, TC 5.66%.     Past Medical History:   Diagnosis Date    Acute midline low back pain with right-sided sciatica 06/07/2023    Acute viral syndrome 12/07/2023    Arthritis     Brain tumor (benign) 1997    Chronic back pain     Diabetes mellitus     Gastroesophageal  reflux disease without esophagitis 08/25/2023    Hypertension      Patient Active Problem List    Diagnosis Date Noted    Chronic pain of right ankle 03/21/2024    Chronic right-sided low back pain with sciatica 08/26/2023    Anaphylaxis due to fish 08/26/2023    Myalgia due to statin 08/25/2023    Diabetic gastroparesis associated with type 2 diabetes mellitus 08/25/2023    Hyperlipidemia 08/25/2023    Encounter for preventive measure 08/25/2023    Primary hypertension 06/07/2023    Poorly controlled type 2 diabetes mellitus with gastroparesis 06/07/2023    Carpal tunnel syndrome 04/16/2018      Family History  Germline cancer-genetic testing in blood relatives:  No  Ashkenazi Episcopal ancestry: No  Consanguinity in ancestors:  No  No known history of cancer of colorectal polyps in extended family other than noted below.     ** If the pedigree is small/illegible, expand this note window horizontally to view the pedigree in a larger format. A copy of the pedigree is also available in Media.**      Family History   Problem Relation Name Age of Onset    Cirrhosis Mother Constantine     Leukemia Mother Constantine     Cirrhosis Maternal Grandmother      Cirrhosis Other Lyla 30 - 39    Pancreatic cancer Other Lyla 60 - 69    Lupus Daughter      Pancreatic cancer Other      Colon cancer Neg Hx      Ovarian cancer Neg Hx        Review of Systems  See HPI.    Pain 0/10  Recent falls: None   Patient's Distress Score today was 0/10 (with 10 being the worst).      OBJECTIVE   Physical Exam  Very pleasant patient.  Unaccompanied  Vitals signs:  There were no vitals filed for this visit.   Constitutional: No apparent distress.   Pulmonary: Normal effort  Neurological: Alert and oriented. No obvious neurological deficits.   Psychiatric: Normal mood, affect, thought content, speech, behavior, judgment.  Genetics-specific: It is my assessment that the patient is ready to proceed with cancer-genetic testing from a psychosocial  perspective.    COUNSELING   Types of cancer:  Hereditary cancer: Approximately one out of ten cancers is hereditary. This means it is caused by an inherited mutation/variant in a single gene that is passed directly from parent to child. These families usually have multiple individuals in successive generations (parents and their children) with the same or related cancers occurring at a younger age than usual.   Sporadic cancer: Approximately nine out of ten cancers are sporadic or random. This means they are caused by genetic mutations acquired during a person's lifetime. These mutations can be caused by environmental, lifestyle, or medical risk factors or even random errors that occur during DNA replication. These families usually have individuals with unrelated cancers at older ages that occur randomly in the family.   Familial cancer: Some families may have what is called familial cancer. This means they have a clustering of a particular cancer that is more than you would expect to see by chance, but is not caused by an inherited mutation in a single gene. Instead, they are caused by a combination of shared risk factors.     Risk factors for cancer:   Lifestyle:  Smoking, alcohol consumption, obesity, lack of exercise, unhealthy diet.   Environmental: Chemical and radiation exposures in the workplace, contaminated air and water, UV exposure from the sun and tanning beds, and ionizing radiation from xrays and CT scans.   Medical conditions: Chronic inflammation (Crohn's disease, diabetes), viral infections (HPV, hepatitis), fatty liver disease, etc.     Possible results of genetic testing:   Positive: There is a mutation in a gene that increases the chance for certain types of cancer. While mutations increase cancer risk, not everyone with a mutation will develop cancer.    Negative: No disease-causing mutations were found in the genes that were tested.   Variant of uncertain significance: There is a genetic  variation that has an unknown impact on cancer risk and does not warrant changes to medical management. In most cases, these variants are found to be harmless.    Genetic testing logistics:  Standard method: A blood sample is collected at an [facility] lab and sent to an outside genetics lab for testing. Blood specimens can be utilized for DNA and RNA analysis.     Cost of testing:   Genetic testing is expensive, but is covered by most health insurance policies. With commercial insurance coverage, most people pay up to $100 and a max of $250 if they haven't met their deductible. If testing isn't covered by insurance, the cash price is $250.  Some genetics labs offer financial assistance.     Genetic information discrimination:  Genetic information discrimination occurs when an employer, insurance company, or other entity uses genetic information to make decisions or otherwise discriminate against an individual. Examples would include an insurance refusing to issue a policy because the individual has a genetic mutation or an employer refusing to hire or promote someone because they discovered they have a mutation or family history of cancer. A federal law called GINNY provide some protections for health insurance and employment. Other laws and policies offer additional protections against genetic discrimination.    The Genetic Information Nondiscrimination Act of 2008 (GNINY) is a federal law that expands the protections included in the Health Insurance Portability and Accountability Act of 1996 (HIPAA).  Under Title I of GINNY, group health plans cannot base premiums for a plan or a group of similarly situated individuals on genetic information. GINNY generally prohibits plans from requesting or requiring an individual to undergo genetic tests, and prohibits a plan from collecting genetic information (including family medical history) prior to or in connection with enrollment, or for underwriting purposes.  This rule does  not apply to individuals who receive their insurance through the federal government or . Those entities have their own set of rules regarding genetic information.   This rule only applies to health insurance. Other types of insurance (life, disability, long-term care, cancer, etc) are not protected. An individual can be denied coverage or charged a higher premium based on their genetic information.   Under Title II of GINNY, it is illegal to discriminate against employees or applicants because of genetic information. Title II of GINNY prohibits the use of genetic information in making employment decisions, restricts employers and other entities covered by Title II (employment agencies, labor organizations and joint labor-management training and apprenticeship  GINNY has a small business exemption for employers with less than 15 employees.    REFERENCES   National Comprehensive Cancer Network (NCCN). (2024). Genetic/familial high-risk assessment: Breast, ovarian, and pancreatic. NCCN Clinical Practice Guidelines in Oncology (NCCN Guidelines), Version 1.2022.    ALFRED Moncada, PANayC  Physician Assistant, Hereditary/High Risk Clinic  Hematology/Oncology, [facility]

## 2024-05-30 ENCOUNTER — NURSE TRIAGE (OUTPATIENT)
Dept: ADMINISTRATIVE | Facility: CLINIC | Age: 59
End: 2024-05-30
Payer: MEDICARE

## 2024-05-31 ENCOUNTER — E-VISIT (OUTPATIENT)
Dept: PRIMARY CARE CLINIC | Facility: CLINIC | Age: 59
End: 2024-05-31
Payer: MEDICARE

## 2024-05-31 ENCOUNTER — TELEPHONE (OUTPATIENT)
Dept: INTERNAL MEDICINE | Facility: CLINIC | Age: 59
End: 2024-05-31
Payer: MEDICARE

## 2024-05-31 DIAGNOSIS — N95.2 VAGINAL ATROPHY: ICD-10-CM

## 2024-05-31 DIAGNOSIS — B37.31 CANDIDA VAGINITIS: Primary | ICD-10-CM

## 2024-05-31 DIAGNOSIS — Z90.710 S/P HYSTERECTOMY: ICD-10-CM

## 2024-05-31 DIAGNOSIS — E11.65 POORLY CONTROLLED TYPE 2 DIABETES MELLITUS: Primary | ICD-10-CM

## 2024-05-31 PROCEDURE — 99421 OL DIG E/M SVC 5-10 MIN: CPT | Mod: ,,, | Performed by: INTERNAL MEDICINE

## 2024-05-31 NOTE — TELEPHONE ENCOUNTER
Spoke with pt re: yeast infection. E-visit questionnaire sent to pt to complete. Explained process to pt, Pt aware an expresses understanding.     Instructed pt to be sure to select  as provider. Pt aware.

## 2024-05-31 NOTE — TELEPHONE ENCOUNTER
Refill Routing Note   Medication(s) are not appropriate for processing by [facility] for the following reason(s):        New or recently adjusted medication    [facility] action(s):  Defer        Medication Therapy Plan: Refill too soon. Should have refills remaining      Appointments  past 12m or future 3m with PCP    Date Provider   Last Visit   4/26/2024 Jessica Durham MD   Next Visit   Visit date not found Jessica Durham MD   ED visits in past 90 days: 0        Note composed:12:16 PM 05/31/2024

## 2024-05-31 NOTE — PROGRESS NOTES
Patient ID: Aisha Muñoz is a 58 y.o. female.    Chief Complaint: Vaginal Discharge (Entered automatically based on patient selection in Patient Portal.)    The patient initiated a request through [employer] on 5/31/2024 for evaluation and management with a chief complaint of Vaginal Discharge (Entered automatically based on patient selection in Patient Portal.)     I evaluated the questionnaire submission on 5/31/2024.    [facility] Peq Evisit Vaginal Discharge    5/31/2024 10:23 AM CDT - Filed by Patient   Do you agree to participate in an E-Visit? Yes   If you have any of the following symptoms,  please do not complete an E-Visit,  schedule an appointment with your provider: I acknowledge   What is the main issue you would like addressed today? I need some medicine please for the infection   Which of the following are you experiencing? Vaginal Itching;  Vaginal discharge    Are you having pain while passing urine? Yes, I have pain while urinating.   Which of the following applies to your vaginal discharge? I have a white/milky discharge.;  I have a foul-smelling discharge.    Which of the following are you experiencing? Frequent urination   Do you have any sores on your genitals? No    Have you taken antibiotics recently? I have not been on any antibiotics    Do you use any of the following? Bubble baths   Which of the following applies to your menstrual period? I do not have menstrual periods   Have you had similar symptoms in the past? Yes, I have had similar symptoms once before.   When you had similar symptoms in the past, did any of the following work? Pills for yeast infection   Have you had a fever? No   During the last 2 months, have you had sexual contact with a specific person for the first time? No   Provide any additional information you feel is important.    Please attach any relevant images or files    Are you able to take your vital signs? No         Encounter Diagnosis   Name Primary?    Candida  vaginitis Yes     Candida vaginitis - start Diflucan 150mg today and repeat in 4 days   If not effective, will obtain vaginitis swab and U/A/UC.  Need to keep glucoses well-controlled with Fasting Glucose ;  90 min after meals <140; bedtime 100-130  If glucose <100 at bedtime, eat small snack    Diet should be high in fiber with 30-35 gms daily, complex carbs, low saturated/trans fat diet,  Avoid fast foods, sweetened drinks, processed , white bread/pasta/rice/potatoes.  Hydate with 6-8 glasses of water daily.exercise 30 min 5 times per week          No orders of the defined types were placed in this encounter.           No follow-ups on file.      E-Visit Time Trackin min

## 2024-05-31 NOTE — ASSESSMENT & PLAN NOTE
start Diflucan 150mg today and repeat in 4 days   If not effective, will obtain vaginitis swab and U/A/UC.  Need to keep glucoses well-controlled with Fasting Glucose ;  90 min after meals <140; bedtime 100-130  If glucose <100 at bedtime, eat small snack    Diet should be high in fiber with 30-35 gms daily, complex carbs, low saturated/trans fat diet,  Avoid fast foods, sweetened drinks, processed , white bread/pasta/rice/potatoes.  Hydate with 6-8 glasses of water daily.exercise 30 min 5 times per week

## 2024-05-31 NOTE — TELEPHONE ENCOUNTER
Good morning,      Bret Giles, May you please assist pt with scheduling a three month follow up along with labs.    Thanks

## 2024-05-31 NOTE — TELEPHONE ENCOUNTER
"Pt states "I'm catching a yeast infection". Declines triage at this time. Requesting medication.   Advised to call PCP office in the AM, office number provided.   Pt USHA.   Reason for Disposition   Prescription request for new medicine (not a refill)    Protocols used: Medication Refill and Renewal Call-A-AH    "

## 2024-05-31 NOTE — TELEPHONE ENCOUNTER
----- Message from Alma Ballard sent at 5/31/2024  9:00 AM CDT -----  Contact: Self/[phone]  1MEDICALADVICE     Patient is calling for Medical Advice regarding:Yeast infection     How long has patient had these symptoms: 2 days     [facility]arm[facility] and phone#: [id number] - [place] - [address] [facility] [address] [place]  [address] [address]  Phone: [phone] Fax: [phone]         Would like response via [employer]:  No, would like a return call     Comments: pt stated that she spoke with the Triage nurse on yesterday  and was told to go to the ER or call her pcp pt is requesting a rx to be sent to the pharmacy. Please advise

## 2024-06-02 RX ORDER — ESTRADIOL 0.1 MG/G
1 CREAM VAGINAL
Qty: 42.5 G | Refills: 3 | Status: SHIPPED | OUTPATIENT
Start: 2024-06-03 | End: 2025-06-03

## 2024-06-05 LAB
GENETIC COUNSELING?: YES
GENSO SPECIMEN TYPE: NORMAL
MISCELLANEOUS GENETIC TEST NAME: NORMAL
PARTENTAL OR SIBLING TESTING?: NO
REFERENCE LAB: NORMAL
TEST RESULT: NORMAL

## 2024-06-19 ENCOUNTER — PATIENT MESSAGE (OUTPATIENT)
Dept: HEMATOLOGY/ONCOLOGY | Facility: CLINIC | Age: 59
End: 2024-06-19
Payer: MEDICARE

## 2024-06-19 NOTE — PROGRESS NOTES
Hereditary/High Risk Clinic  Department of Hematology and Oncology  [facility]    Cancer Genetics  Results Disclosure & Post-Test Counseling    Date of Service:  24  Visit Provider:  Janis Arce PA-C  Collaborating Physician:  Richa Dutta MD    Patient:  Aisha Muñoz  :  1965  MRN:  [id number]     Referring Provider:   Alden Luna MD  [facility] [place] [address]     GENETIC TESTING RESULTS     NAME Aisha Muñoz   TEST [facility]    ACCESSION # [id number]   GENES ANALYZED ([employer] 34 genes) APC, CHANA, AXIN2, BARD1, BMPR1A, BRCA1, BRCA2, BRIP1, CDH1, CDK4, CDKN2A, CHEK2, DICER1, EPCAM, GREM1, HOXB13, MLH1, MSH2, MSH3, MSH6, MUTYH, NF1, NTHL1, PALB2, PMS2, POLD1, POLE, PTEN, RAD51C, RAD51D, SMAD4, SMARCA4, STK11, TP53.   RESULTED 2024     The test did not identify any variants known to cause cancer.    The test did identify a variant in MSH6 that may or may not cause cancer.     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     Variants of uncertain significance (VUS):   Genetic mutations/variants are classified as benign (harmless), pathogenic (harmful, disease-causing), or uncertain significance. Uncertain means there's an abnormality in the gene but there is insufficient evidence to determine if the abnormality affects the function of the gene and results in an increased risk for cancer or other disease.   No action is needed for a variant of uncertain significance.   The genetics lab will notify the patient and the ordering provider when the variant is reclassified as either benign or pathogenic. Relatives only need to be tested for the variant if it is reclassified as pathogenic or likely pathogenic.   Approximately 90% of all VUS's are reclassified as benign. Therefore, no clinical decisions should be made based  on a VUS, even if the variant seems to explain the cancers in the individual and/or family. In most cases, this is just a coincidence.     VARIANT DETAILS:   The p.G163S variant (also known as c.487G>A), located in coding exon 3 of the MSH6 gene, results from a G to A substitution at nucleotide position 487. The glycine at codon 163 is replaced by serine, an amino acid with similar properties. This amino acid position is highly conserved in available vertebrate species. In addition, the in silico prediction for this alteration is inconclusive. Since supporting evidence is limited at this time, the clinical significance of this alteration remains unclear.     GENE INFORMATION:   Pathogenic MSH6 mutations are associated with Andrade syndrome and an increased risk for colorectal, endometrial, and ovarian cancer. This gene is not associated with an increased risk for pancreatic cancer.    ASSESSMENT   Aisha Muñoz, 58 y.o., assigned female sex at birth, has a personal/family history of:   Aisha: No cancers. Benign brain tumor resected at age 31.   Mother: Cirrhosis (heavy drinker), leukemia,  55  MGM: Cirrhosis (heavy drinker),  56   Maternal half-aunt: Cirrhosis (heavy drinker), pancreatic cancer early 60s,    Maternal first cousin once removed: Aisha thought she  from pancreatic cancer, but just found out it was breast cancer.     Aisha underwent comprehensive germline genetic testing that included genes associated with hereditary pancreatic and other cancers. The test did not identify any mutations known to cause cancer.  It is unknown if her relatives with cancer had a mutation she did not inherit. However, it is most likely that her maternal half-aunt's pancreatic cancer was related to alcohol abuse.    The test did detect a variant of uncertain significance in MSH6. I reviewed ClinVar. Only 3 labs are reporting on this specific variant.  [facility] classifies this variant as benign with  no explanation given. [facility] classifies the variant as uncertain significance and comments that the variant is present in population database is and has not been reported in individuals with MSH6-related conditions. No change in management is recommended for Aisha or her family based on this variant.    PLAN   1. Encounter for nonprocreative genetic counseling  - No further genetic testing is indicated.   - No action is needed for the VUS. [facility] will notify the patient and me when the variant is reclassified.   - Annual well woman exam for breast and gynecologic cancer screening (ovaries in situ).  - Colonoscopy is due 5/2034.    2. Family history of pancreatic cancer  - Genetic testing negative.   - Pancreatic cancer screening is not indicated.    - Cancer risk-reduction strategies.    Questions were encouraged and answered to the patient's satisfaction, and she verbalized understanding of information and agreement with the plan.       SUBJECTIVE   Chief Complaint: Post-test genetic counseling  History of Present Illness (HPI):  Aisha Muñoz was previously seen by me for a genetic evaluation and pre-test genetic counseling. She underwent genetic testing and is seen today to review the results and recommendations.   Medical, surgical, family history: Reviewed. No pertinent changes from previous visit.     Review of Systems  See HPI.      OBJECTIVE   Physical Exam  Limited secondary to the inherent nature of a virtual visit.  Very pleasant patient.  Constitutional: No apparent distress.   Neurological: Alert and oriented. No obvious neurological deficits.   Psychiatric: Normal mood, affect, thought content, speech, behavior, judgment.    REFERENCES   National Comprehensive Cancer Network (NCCN). (2021). Genetic/familial high-risk assessment: Breast, ovarian, and pancreatic. NCCN Clinical Practice Guidelines in Oncology (NCCN Guidelines), Version 1.2022.      ALFRED Moncada, PANayC  Physician  Assistant, Hereditary/High Risk Clinic  Hematology/Oncology, [facility]      Televisit Information:  Patient location: [place]  Visit type: Audio only (telephone).   The reason for the audio only service rather than synchronous audio and video virtual visit was related to technical difficulties or patient preference/necessity.  Telephone time with patient: approximately 5 minutes.  Approximately 15 minutes in total were spent on this encounter, which includes telephone time and non-telephone time preparing to see the patient (e.g., review of tests), obtaining and/or reviewing separately obtained history, documenting clinical information in the electronic or other health record, independently interpreting results (not separately reported) and communicating results to the patient/family/caregiver, or care coordination (not separately reported). Each patient to whom I provide medical services by telemedicine is: (1) informed of the relationship between the physician and patient and the respective role of any other health care provider with respect to management of the patient; and (2) notified that they may decline to receive medical services by telemedicine and may withdraw from such care at any time. Patient verbally consented to receive this service via voice-only telephone call. This service was not originating from a related E/M service provided within the previous 7 days nor will  to an E/M service or procedure within the next 24 hours or my soonest available appointment.  Prevailing standard of care was able to be met in this audio-only visit.

## 2024-06-20 ENCOUNTER — HOSPITAL ENCOUNTER (OUTPATIENT)
Dept: RADIOLOGY | Facility: HOSPITAL | Age: 59
Discharge: HOME | End: 2024-06-20
Attending: PHYSICIAN ASSISTANT
Payer: MEDICARE

## 2024-06-20 ENCOUNTER — OFFICE VISIT (OUTPATIENT)
Dept: HEMATOLOGY/ONCOLOGY | Facility: CLINIC | Age: 59
End: 2024-06-20
Payer: MEDICARE

## 2024-06-20 DIAGNOSIS — Z80.0 FAMILY HISTORY OF PANCREATIC CANCER: ICD-10-CM

## 2024-06-20 DIAGNOSIS — Z71.83 ENCOUNTER FOR NONPROCREATIVE GENETIC COUNSELING: Primary | ICD-10-CM

## 2024-06-20 DIAGNOSIS — M25.571 CHRONIC PAIN OF RIGHT ANKLE: Primary | ICD-10-CM

## 2024-06-20 DIAGNOSIS — G89.29 CHRONIC PAIN OF RIGHT ANKLE: ICD-10-CM

## 2024-06-20 DIAGNOSIS — G89.29 CHRONIC PAIN OF RIGHT ANKLE: Primary | ICD-10-CM

## 2024-06-20 DIAGNOSIS — M25.571 CHRONIC PAIN OF RIGHT ANKLE: ICD-10-CM

## 2024-06-20 PROCEDURE — 73610 X-RAY EXAM OF ANKLE: CPT | Mod: 26,RT,, | Performed by: RADIOLOGY

## 2024-06-20 PROCEDURE — 73610 X-RAY EXAM OF ANKLE: CPT | Mod: TC,RT

## 2024-06-20 NOTE — PATIENT INSTRUCTIONS
Recommendations:   - No further genetic testing is indicated.   - No action is needed for the variant of uncertain significance. [facility] will notify you and me when the variant is reclassified. I suspect that it will be reclassified as benign (harmless), meaning it does not cause cancer.   - Annual well woman exam for breast and gynecologic cancer screening (ovaries in situ).  - Colonoscopy is due 5/2034.    Cancer risk reduction strategies  Most cancers are caused by a combination of aging, environmental exposures (chemicals, radiation), lifestyle factors (alcohol, smoking, obesity, sedentary lifestyle, poor diet), and medical conditions that cause infection or chronic inflammation in the body (diabetes, fatty liver disease, and viruses like HPV).     Don't smoke. Avoid second-hand smoke.   Limit alcohol consumption.   Exercise at least 150 minutes per week.   Maintain a healthy weight.   Eat a healthy diet.   Plant-based diets like the Mediterranean diet have been proven to reduce the risk for cancer and other medical conditions and help you live a longer, healthier life.   Eat more vegetables, fruits, nuts, beans, and whole grains. Healthy proteins include eggs, poultry, and seafood.   Limit red meat to no more than three portions per week.   Avoid processed foods. These foods can contain artificial preservatives and may be high in salt, fat, and sugar. This includes sugary drinks, processed meats (deli meat, hot dogs, sausages), frozen pizza/meals, packaged baked goods (cookies, crackers, chips), most breakfast cereals, canned/instant soup, boxed instant pasta products, and sweetened yogurt.   Take steps to control or resolve any chronic medical conditions. For example, diabetes and viral infections (HPV, Conor-Barr/mononucleosis, hepatitis B and C) are risk factors for cancer.   Protect against sexually transmitted infections.   Avoid UV exposure from the sun and tanning beds.   Wear all recommended protective  gear in the workplace and report any safety concerns.   Engage in all recommended cancer screenings.

## 2024-07-19 ENCOUNTER — CLINICAL SUPPORT (OUTPATIENT)
Dept: REHABILITATION | Facility: HOSPITAL | Age: 59
End: 2024-07-19
Payer: MEDICARE

## 2024-07-19 DIAGNOSIS — S86.001A INJURY OF RIGHT ACHILLES TENDON, INITIAL ENCOUNTER: ICD-10-CM

## 2024-07-19 DIAGNOSIS — R52 PAIN: Primary | Chronic | ICD-10-CM

## 2024-07-19 PROCEDURE — 97161 PT EVAL LOW COMPLEX 20 MIN: CPT | Performed by: PHYSICAL THERAPIST

## 2024-07-19 PROCEDURE — 97110 THERAPEUTIC EXERCISES: CPT | Performed by: PHYSICAL THERAPIST

## 2024-07-19 NOTE — PATIENT INSTRUCTIONS
Ankle Pump    With affected leg elevated, gently flex and extend ankle. Move through full range of motion. Avoid pain. Perform more frequently if having increased swelling.  Repeat 25 times. Do 1 set per session. Do 2 sessions per day.      Ankle Alphabet    Sit with leg straight out in front of you and heel off edge of bed or propped up on towel roll. Using ankle and foot only, trace the letters of the alphabet. Perform A to Z. Do not let the knee move too much side to side.  Repeat 1 times on affected side Do 2 sessions per day.      Towel Scrunches    Place affected foot flat on towel, knee pointed forward. Use forefoot and toes to pull towel backward.  Do not allow heel or knee to move. Repetitions should be slow and controlled.  Repeat 25 times Do 1 set per session. Do 2 sessions per day.      Gastroc, Sitting (Passive)     Sit with leg straight out in front of you and a towel under your heel and around ball of foot. Gently pull toward body. Hold 30 seconds.   Repeat 5 times. Do 1 sets per session. Do 2 sessions per day.    Resistance Band Ankle Movements, 4 ways    1. Wrap band around foot and attach below the foot. Pull foot up against resistance band. Slowly release for 3-5 seconds.  2. Wrap band around foot and hold band in your hand. Push foot down against resistance band. Slowly release for 3-5 seconds.  3. Wrap band around foot and loop around other foot and hold the end of the band. Pull foot out to side against resistance band. Slowly release for 3-5 seconds.   4. Cross one leg over the other, wrap band around bottom foot, step top of foot on band and hold the end of the band. Pull foot to the inside against resistance band. Slowly release for 3-5 seconds.     Use  resistance band.  Repeat 25 times Do 1 set per session. Do 2 sessions per day.      Calf Raise: (Standing)    Stand on both feet and rise on ball of foot. Do not let ankle roll out. Slowly return to start and repeat.  Repeat 25 times per  set. Do 1 set per session. Do 2 sessions per day.       Straight Leg Raise     With R/L or BL leg straight, other leg bent, raise straight leg until knees are even. Slowly lower. Roll on your side and repeat lifting top leg up, on other side, lifting bottom leg up, and on stomach kicking back (squeezing your rear end before you kick back).  Repeat 25 times. Do 1 sets per session. Do 2 sessions per day.

## 2024-07-19 NOTE — PLAN OF CARE
[facility]   Physical Therapy Initial Evaluation      Name: Aisha Muñoz  Clinic Number: [id number]    Therapy Diagnosis:   Encounter Diagnoses   Name Primary?    Injury of right Achilles tendon, initial encounter     Pain Yes        Physician: Claire Palencia PA-C    Physician Orders: PT Eval and Treat   Medical Diagnosis from Referral: S86.001A (ICD-10-CM) - Injury of right Achilles tendon  Evaluation Date: 7/19/2024  Authorization Period Expiration: 7-03-25  Plan of Care Expiration: 9-30-24  Visit # / Visits authorized: 1/ 1   FOTO: 1/5    Precautions: Standard     Time In: 8:40 am  Time Out: 9:15 am  Total Appointment Time (timed & untimed codes): 35 minutes    Subjective     Date of onset: 1 yr ago  History of current condition - Aisha reports: posterior ankle pain began insidiously.  Pt [place]  She c/o posterior ankle pain and swelling, though no pain at present.  States she wore a walking boot for 3 mo without relief.    Pt c/o pain mainly at night and with prolonged wt-bearing activities.  Pt states pain limits her ADL.      Past Medical History:   Diagnosis Date    Acute midline low back pain with right-sided sciatica 06/07/2023    Acute viral syndrome 12/07/2023    Arthritis     Brain tumor (benign) 1997    Chronic back pain     Diabetes mellitus     Gastroesophageal reflux disease without esophagitis 08/25/2023    Genetic testing 06/2024    MSH6 variant of uncertain significance, see Ambry results in Media.    Hypertension      Aisha Muñoz  has a past surgical history that includes Hysterectomy; Carpal tunnel release (Bilateral); Back surgery; Brain surgery (1997); and Colonoscopy (N/A, 05/24/2024).    Aisha has a current medication list which includes the following prescription(s): acetaminophen, albuterol, diclofenac sodium, epinephrine, estradiol, fluticasone propionate, lidocaine, lisinopril, ondansetron,  oxycodone-acetaminophen, pantoprazole, pioglitazone, pitavastatin calcium, and tirzepatide.    Review of patient's allergies indicates:   Allergen Reactions    Fish containing products Shortness Of Breath, Swelling and Rash    Grape Itching    Strawberries [strawberry] Itching    Banana Itching    Lyrica [pregabalin] Other (See Comments)     headache    Pineapple Hives and Itching        Imaging: x-ray:  No fracture dislocation bone destruction or OCD seen.  Ankle mortise is maintained.  There are spurs on the calcaneus.        Prior Therapy: na  Social History:  lives with their spouse  Occupation: works at [employer]  Prior Level of Function: walking  Current Level of Function: ADL limited    Pain:  Current 0/10, worst 9/10, best 0/10   Location: right ankle  Description: Aching, Dull, and Sharp  Aggravating Factors: Standing, Walking, and Night Time  Easing Factors: rest    Pts goals: decrease pain and swelling    Objective     Postural examination:  mild decreased arch height     Functional assessment:   - walking:   independent             AROM:  0 deg DF, 35 PF, 15 inv and 10 ev, pain with PF     MMT:   4/5 hip abductors and 4+/5 ankle    Tone:  decreased gastroc    Flexibility testing:   tight heelcords    Special tests:   neg ankle stress tests    Palpation:   TTP achilles tendon insertion on calcaneus    Joint mobility: fair subtalar joint    Swelling:  mild-MOD lateral to achilles tendon    Other:  sensation intact to light touch    CMS Impairment/Limitation/Restriction for FOTO ankle Survey    Therapist reviewed FOTO scores for Aisha Muñoz on 7/19/2024.   FOTO documents entered into [employer] - see Media section.           TREATMENT     Treatment Time In: 8:40 am  Treatment Time Out: 9:15 am  Total Treatment time separate from Evaluation time:  15 min    Treatment:  HEP    Home Exercises and Patient Education Provided    Education provided:   - ice for pain    Written Home Exercises Provided:  yes.  Exercises were reviewed and Aisha was able to demonstrate them prior to the end of the session.  Aisha demonstrated good  understanding of the education provided.     See EMR under Media for exercises provided 7/19/2024.      Assessment     Aisha is a 58 y.o. female referred to outpatient Physical Therapy with a medical diagnosis of S86.001A (ICD-10-CM) - Injury of right Achilles tendon.  Pt presents with R ankle pain with decreased ROM, weakness and decreased flexibility that limit ADL.    Pt prognosis is Good.   Pt will benefit from skilled outpatient Physical Therapy to address the deficits stated above and in the chart below, provide pt/family education, and to maximize pt's level of independence.     Plan of care discussed with patient: Yes  Pt's spiritual, cultural and educational needs considered and patient is agreeable to the plan of care and goals as stated below:     Anticipated Barriers for therapy: length of time in pain    Medical Necessity is demonstrated by the following  History  Co-morbidities and personal factors that may impact the plan of care [x] LOW: no personal factors / co-morbidities  [] MODERATE: 1-2 personal factors / co-morbidities  [] HIGH: 3+ personal factors / co-morbidities    Moderate / High Support Documentation:   Co-morbidities affecting plan of care: na    Personal Factors:   no deficits     Examination  Body Structures and Functions, activity limitations and participation restrictions that may impact the plan of care [x] LOW: addressing 1-2 elements  [] MODERATE: 3+ elements  [] HIGH: 4+ elements (please support below)    Moderate / High Support Documentation: na     Clinical Presentation [x] LOW: stable  [] MODERATE: Evolving  [] HIGH: Unstable     Decision Making/ Complexity Score: low       Goals:  Short Term Goals: 2 weeks         1.   Independent with HEP        2.  Pt will report decreased pain level of < 50% from above measure with ADL    Long Term Goals:   GOALS:     8_   weeks. Pt agrees with goals set.  Independent with HEP.  Report decreased    R ankle    pain  <   / =  3/10 with ADL such as prolonged standing/walking   Increased MMT  for  R LE to 5/5  with ADL such as stair climbing  Increased arom  for  R ankle to WNL with functional activities such walking or self-care      Plan     Certification Period/Plan of care expiration: 7/19/2024 to 9-30-24.    Outpatient Physical Therapy 1 times weekly for 8 weeks to include the following interventions: Manual Therapy, Moist Heat/ Ice, Patient Education, Therapeutic Activities, and Therapeutic Exercise and dry needling.     Mono Dupree, PT

## 2024-07-26 ENCOUNTER — OFFICE VISIT (OUTPATIENT)
Dept: ORTHOPEDICS | Facility: CLINIC | Age: 59
End: 2024-07-26
Payer: MEDICARE

## 2024-07-26 ENCOUNTER — CLINICAL SUPPORT (OUTPATIENT)
Dept: REHABILITATION | Facility: HOSPITAL | Age: 59
End: 2024-07-26
Payer: MEDICARE

## 2024-07-26 DIAGNOSIS — R52 PAIN: Primary | Chronic | ICD-10-CM

## 2024-07-26 DIAGNOSIS — M76.61 TENDONITIS, ACHILLES, RIGHT: Primary | ICD-10-CM

## 2024-07-26 PROCEDURE — 97112 NEUROMUSCULAR REEDUCATION: CPT | Performed by: PHYSICAL THERAPIST

## 2024-07-26 PROCEDURE — 97110 THERAPEUTIC EXERCISES: CPT | Performed by: PHYSICAL THERAPIST

## 2024-07-26 NOTE — PROGRESS NOTES
"Subjective     Patient ID: Aisha Mñuoz is a 58 y.o. female.    Chief Complaint: No chief complaint on file.    HPI    Patient is a 58 year old female who presents to clinic with chief complaint of right posterior ankle pain. Pain is increased at the end of the day of walking and standing. Has tried voltern and boot with mild to no relief. Becky void NSAID and surgery due to blood sugars. Has gone to PT twice.     Lab Results   Component Value Date    HGBA1C 8.6 (H) 04/01/2024         Estimated body mass index is 29.38 kg/m² as calculated from the following:    Height as of 5/24/24: 5' 6" (1.676 m).    Weight as of 5/24/24: 82.6 kg (182 lb).        Review of Systems   Constitutional: Negative for chills and fever.   Cardiovascular:  Negative for chest pain.   Respiratory:  Negative for cough and shortness of breath.    Skin:  Negative for color change, dry skin, itching, nail changes, poor wound healing and rash.   Musculoskeletal:         Right ankle pain   Neurological:  Negative for dizziness.   Psychiatric/Behavioral:  Negative for altered mental status. The patient is not nervous/anxious.    All other systems reviewed and are negative.         Objective     General    Constitutional: She is oriented to person, place, and time. She appears well-developed and well-nourished. No distress.   HENT:   Head: Atraumatic.   Eyes: Conjunctivae are normal.   Cardiovascular:  Normal rate.            Pulmonary/Chest: Effort normal.   Neurological: She is alert and oriented to person, place, and time.   Psychiatric: She has a normal mood and affect. Her behavior is normal.         Right Ankle/Foot Exam     Tenderness   The patient is tender to palpation of the Achilles tendon.    Range of Motion   The patient has normal right ankle ROM.          Physical Exam  Constitutional:       General: She is not in acute distress.     Appearance: She is well-developed and well-nourished. She is not diaphoretic.   HENT:      Head: " Atraumatic.   Eyes:      Conjunctiva/sclera: Conjunctivae normal.   Cardiovascular:      Rate and Rhythm: Normal rate.   Pulmonary:      Effort: Pulmonary effort is normal.   Musculoskeletal:      Right ankle:      Right Achilles Tendon: Tenderness present.   Neurological:      Mental Status: She is alert and oriented to person, place, and time.   Psychiatric:         Mood and Affect: Mood and affect normal.         Behavior: Behavior normal.          RADS: reviewed by myself:     Osseous mineralization is preserved. No acute displaced fractures. No suspicious lytic or blastic lesions. Tibial plafond and talar dome are intact. Ankle mortise is symmetric. Tibiotalar, subtalar and midtarsal cartilage spaces are preserved. No subluxation or dislocation. Small distal Achilles enthesophyte. Increased soft tissue attenuation at the level of Kager's fat pad, possibly related to an accessory soleus muscle.   Encounter Diagnosis   Name Primary?    Chronic pain of right ankle Yes       Discussed plan with the patient. At time time will treat with RICE, heel lift and PT. Patient is to return to clinic as needed.

## 2024-07-26 NOTE — PROGRESS NOTES
Physical Therapy Daily Treatment Note     Name: Aisha STRANGE [facility] Number: [id number]    Therapy Diagnosis:   Encounter Diagnosis   Name Primary?    Pain Yes     Physician: Claire Palencia PA-C    Visit Date: 7/26/2024  Physician Orders: PT Eval and Treat   Medical Diagnosis from Referral: S86.001A (ICD-10-CM) - Injury of right Achilles tendon  Evaluation Date: 7/19/2024  Authorization Period Expiration: 7-03-25  Plan of Care Expiration: 9-30-24  Visit # / Visits authorized: 1/ 20   FOTO: 1/5    Time In: 6:05 am  Time Out: 6:35  Total Billable Time: 25 minutes    Precautions: Standard    Subjective     Pt reports: R ankle feels the same.  No pain at present.  Pain with prolonged walking and at night.  She was not compliant with home exercise program.  Response to previous treatment: na  Functional change: na    Pain: 0/10  Location: right ankle     Objective     Aisha received therapeutic exercises to develop strength, endurance, ROM, and flexibility for 10 minutes including:  Heelcord stretch on angle board 3 x 1 min  Bike x 5 min  SLR x 25  HEP review    Aisha participated in neuromuscular re-education activities to improve: Balance and Proprioception for 15 minutes. The following activities were included:  Standing GTB hip abd and ext x 25 ea B  GTB bridges x 25  B GTB clamshells x 25  Supine hip abd with GTB x 25  Foam balance in SLS  Lateral step ups x 25    Aisha participated in dynamic functional therapeutic activities to improve functional performance for 0  minutes, including:      Aisha received cold pack for 0 minutes to R ankle.      Home Exercises Provided and Patient Education Provided     Education provided:   - ice for pain    Written Home Exercises Provided: Patient instructed to cont prior HEP.  Exercises were reviewed and Aisha was able to demonstrate them prior to the end of the session.  Aisha demonstrated good  understanding of the education provided.     See EMR under Media for exercises  provided prior visit.    Assessment     Tolerated exercises well.  Treatment shortened due to pt having another appt.  Aisha Is progressing well towards her goals.   Pt prognosis is Good.     Pt will continue to benefit from skilled outpatient physical therapy to address the deficits listed in the problem list box on initial evaluation, provide pt/family education and to maximize pt's level of independence in the home and community environment.     Pt's spiritual, cultural and educational needs considered and pt agreeable to plan of care and goals.    Anticipated barriers to physical therapy: none    Goals: Short Term Goals: 2 weeks         1.   Independent with HEP        2.  Pt will report decreased pain level of < 50% from above measure with ADL     Long Term Goals:   GOALS:    8_   weeks. Pt agrees with goals set.  Independent with HEP.  Report decreased    R ankle    pain  <   / =  3/10 with ADL such as prolonged standing/walking   Increased MMT  for  R LE to 5/5  with ADL such as stair climbing  Increased arom  for  R ankle to WNL with functional activities such walking or self-care       Plan     Continue PT per POC.    Mono Dupree, PT

## 2024-08-09 ENCOUNTER — CLINICAL SUPPORT (OUTPATIENT)
Dept: REHABILITATION | Facility: HOSPITAL | Age: 59
End: 2024-08-09
Payer: MEDICARE

## 2024-08-09 DIAGNOSIS — R52 PAIN: Primary | Chronic | ICD-10-CM

## 2024-08-09 PROCEDURE — 97112 NEUROMUSCULAR REEDUCATION: CPT | Performed by: PHYSICAL THERAPIST

## 2024-08-09 PROCEDURE — 97110 THERAPEUTIC EXERCISES: CPT | Performed by: PHYSICAL THERAPIST

## 2024-08-16 ENCOUNTER — LAB VISIT (OUTPATIENT)
Dept: LAB | Facility: HOSPITAL | Age: 59
End: 2024-08-16
Attending: INTERNAL MEDICINE
Payer: MEDICARE

## 2024-08-16 DIAGNOSIS — E11.65 POORLY CONTROLLED TYPE 2 DIABETES MELLITUS: ICD-10-CM

## 2024-08-16 LAB
ALBUMIN SERPL BCP-MCNC: 3.2 G/DL (ref 3.5–5.2)
ALP SERPL-CCNC: 77 U/L (ref 55–135)
ALT SERPL W/O P-5'-P-CCNC: 17 U/L (ref 10–44)
ANION GAP SERPL CALC-SCNC: 11 MMOL/L (ref 8–16)
AST SERPL-CCNC: 19 U/L (ref 10–40)
BILIRUB SERPL-MCNC: 0.6 MG/DL (ref 0.1–1)
BUN SERPL-MCNC: 10 MG/DL (ref 6–20)
CALCIUM SERPL-MCNC: 9.5 MG/DL (ref 8.7–10.5)
CHLORIDE SERPL-SCNC: 106 MMOL/L (ref 95–110)
CHOLEST SERPL-MCNC: 145 MG/DL (ref 120–199)
CHOLEST/HDLC SERPL: 2.2 {RATIO} (ref 2–5)
CO2 SERPL-SCNC: 21 MMOL/L (ref 23–29)
CREAT SERPL-MCNC: 0.8 MG/DL (ref 0.5–1.4)
ERYTHROCYTE [DISTWIDTH] IN BLOOD BY AUTOMATED COUNT: 13 % (ref 11.5–14.5)
EST. GFR  (NO RACE VARIABLE): >60 ML/MIN/1.73 M^2
ESTIMATED AVG GLUCOSE: 206 MG/DL (ref 68–131)
GLUCOSE SERPL-MCNC: 159 MG/DL (ref 70–110)
HBA1C MFR BLD: 8.8 % (ref 4–5.6)
HCT VFR BLD AUTO: 43.1 % (ref 37–48.5)
HDLC SERPL-MCNC: 65 MG/DL (ref 40–75)
HDLC SERPL: 44.8 % (ref 20–50)
HGB BLD-MCNC: 14 G/DL (ref 12–16)
LDLC SERPL CALC-MCNC: 70 MG/DL (ref 63–159)
MAGNESIUM SERPL-MCNC: 1.6 MG/DL (ref 1.6–2.6)
MCH RBC QN AUTO: 30.6 PG (ref 27–31)
MCHC RBC AUTO-ENTMCNC: 32.5 G/DL (ref 32–36)
MCV RBC AUTO: 94 FL (ref 82–98)
NONHDLC SERPL-MCNC: 80 MG/DL
PLATELET # BLD AUTO: 260 K/UL (ref 150–450)
PMV BLD AUTO: 11.3 FL (ref 9.2–12.9)
POTASSIUM SERPL-SCNC: 4.2 MMOL/L (ref 3.5–5.1)
PROT SERPL-MCNC: 6.7 G/DL (ref 6–8.4)
RBC # BLD AUTO: 4.58 M/UL (ref 4–5.4)
SODIUM SERPL-SCNC: 138 MMOL/L (ref 136–145)
TRIGL SERPL-MCNC: 50 MG/DL (ref 30–150)
WBC # BLD AUTO: 5.98 K/UL (ref 3.9–12.7)

## 2024-08-16 PROCEDURE — 80053 COMPREHEN METABOLIC PANEL: CPT | Performed by: INTERNAL MEDICINE

## 2024-08-16 PROCEDURE — 83735 ASSAY OF MAGNESIUM: CPT | Performed by: INTERNAL MEDICINE

## 2024-08-16 PROCEDURE — 36415 COLL VENOUS BLD VENIPUNCTURE: CPT | Performed by: INTERNAL MEDICINE

## 2024-08-16 PROCEDURE — 83036 HEMOGLOBIN GLYCOSYLATED A1C: CPT | Performed by: INTERNAL MEDICINE

## 2024-08-16 PROCEDURE — 85027 COMPLETE CBC AUTOMATED: CPT | Performed by: INTERNAL MEDICINE

## 2024-08-16 PROCEDURE — 80061 LIPID PANEL: CPT | Performed by: INTERNAL MEDICINE

## 2024-08-19 ENCOUNTER — TELEPHONE (OUTPATIENT)
Dept: PRIMARY CARE CLINIC | Facility: CLINIC | Age: 59
End: 2024-08-19
Payer: MEDICARE

## 2024-08-19 ENCOUNTER — TELEPHONE (OUTPATIENT)
Dept: GASTROENTEROLOGY | Facility: CLINIC | Age: 59
End: 2024-08-19
Payer: MEDICARE

## 2024-08-19 NOTE — TELEPHONE ENCOUNTER
----- Message from Sunil Champion MA sent at 8/19/2024 10:27 AM CDT -----  Contact: [email or web address]@ [phone]  Pt called                Pt is requesting a sooner appt if possible  within next week for left shoulder being swollen.

## 2024-08-19 NOTE — TELEPHONE ENCOUNTER
----- Message from Natasha Lucero sent at 8/19/2024  2:07 PM CDT -----  Type:  Patient Returning Call    Who Called:pt   Who Left Message for Patient:  Does the patient know what this is regarding?:appt reschedule   Would the patient rather a call back or a response via [employer]? call  Best Call Back Number:[phone]  Additional Information: states she needs to reschedule appt and that she needs an appt on a Friday

## 2024-08-30 ENCOUNTER — PATIENT MESSAGE (OUTPATIENT)
Dept: PRIMARY CARE CLINIC | Facility: CLINIC | Age: 59
End: 2024-08-30
Payer: MEDICARE

## 2024-08-30 DIAGNOSIS — E11.65 POORLY CONTROLLED TYPE 2 DIABETES MELLITUS: Primary | ICD-10-CM

## 2024-09-10 ENCOUNTER — PATIENT OUTREACH (OUTPATIENT)
Dept: ADMINISTRATIVE | Facility: HOSPITAL | Age: 59
End: 2024-09-10
Payer: MEDICARE

## 2024-09-17 ENCOUNTER — OFFICE VISIT (OUTPATIENT)
Dept: PRIMARY CARE CLINIC | Facility: CLINIC | Age: 59
End: 2024-09-17
Payer: MEDICARE

## 2024-09-17 VITALS
DIASTOLIC BLOOD PRESSURE: 80 MMHG | OXYGEN SATURATION: 98 % | HEART RATE: 66 BPM | WEIGHT: 189.38 LBS | HEIGHT: 66 IN | BODY MASS INDEX: 30.44 KG/M2 | SYSTOLIC BLOOD PRESSURE: 122 MMHG

## 2024-09-17 DIAGNOSIS — Z00.00 PREVENTATIVE HEALTH CARE: Primary | ICD-10-CM

## 2024-09-17 DIAGNOSIS — G89.29 CHRONIC PAIN OF RIGHT ANKLE: ICD-10-CM

## 2024-09-17 DIAGNOSIS — Z87.891 HISTORY OF TOBACCO USE: ICD-10-CM

## 2024-09-17 DIAGNOSIS — T46.6X5A MYALGIA DUE TO STATIN: ICD-10-CM

## 2024-09-17 DIAGNOSIS — B37.31 CANDIDA VAGINITIS: ICD-10-CM

## 2024-09-17 DIAGNOSIS — E78.5 HYPERLIPIDEMIA ASSOCIATED WITH TYPE 2 DIABETES MELLITUS: ICD-10-CM

## 2024-09-17 DIAGNOSIS — E11.43 POORLY CONTROLLED TYPE 2 DIABETES MELLITUS WITH GASTROPARESIS: ICD-10-CM

## 2024-09-17 DIAGNOSIS — M75.52 CHRONIC SHOULDER BURSITIS, LEFT: ICD-10-CM

## 2024-09-17 DIAGNOSIS — Z12.31 ENCOUNTER FOR SCREENING MAMMOGRAM FOR MALIGNANT NEOPLASM OF BREAST: ICD-10-CM

## 2024-09-17 DIAGNOSIS — M54.42 CHRONIC BILATERAL LOW BACK PAIN WITH BILATERAL SCIATICA: ICD-10-CM

## 2024-09-17 DIAGNOSIS — M54.41 CHRONIC BILATERAL LOW BACK PAIN WITH BILATERAL SCIATICA: ICD-10-CM

## 2024-09-17 DIAGNOSIS — Z88.8 ALLERGY TO ACE INHIBITORS: ICD-10-CM

## 2024-09-17 DIAGNOSIS — E11.65 POORLY CONTROLLED TYPE 2 DIABETES MELLITUS WITH GASTROPARESIS: ICD-10-CM

## 2024-09-17 DIAGNOSIS — G89.29 CHRONIC BILATERAL LOW BACK PAIN WITH BILATERAL SCIATICA: ICD-10-CM

## 2024-09-17 DIAGNOSIS — E11.69 HYPERLIPIDEMIA ASSOCIATED WITH TYPE 2 DIABETES MELLITUS: ICD-10-CM

## 2024-09-17 DIAGNOSIS — I10 PRIMARY HYPERTENSION: ICD-10-CM

## 2024-09-17 DIAGNOSIS — M79.10 MYALGIA DUE TO STATIN: ICD-10-CM

## 2024-09-17 DIAGNOSIS — E11.65 POORLY CONTROLLED TYPE 2 DIABETES MELLITUS: ICD-10-CM

## 2024-09-17 DIAGNOSIS — M25.571 CHRONIC PAIN OF RIGHT ANKLE: ICD-10-CM

## 2024-09-17 PROBLEM — R52 PAIN: Chronic | Status: RESOLVED | Noted: 2024-07-19 | Resolved: 2024-09-17

## 2024-09-17 PROBLEM — K31.84 DIABETIC GASTROPARESIS ASSOCIATED WITH TYPE 2 DIABETES MELLITUS: Status: RESOLVED | Noted: 2023-08-25 | Resolved: 2024-09-17

## 2024-09-17 LAB — GLUCOSE SERPL-MCNC: 174 MG/DL (ref 70–110)

## 2024-09-17 PROCEDURE — 99999 PR PBB SHADOW E&M-EST. PATIENT-LVL V: CPT | Mod: PBBFAC,,, | Performed by: INTERNAL MEDICINE

## 2024-09-17 RX ORDER — EZETIMIBE 10 MG/1
10 TABLET ORAL DAILY
Qty: 90 TABLET | Refills: 3 | Status: SHIPPED | OUTPATIENT
Start: 2024-09-17 | End: 2025-09-17

## 2024-09-17 RX ORDER — PITAVASTATIN CALCIUM 4.18 MG/1
4 TABLET, FILM COATED ORAL DAILY
Qty: 90 TABLET | Refills: 3 | Status: SHIPPED | OUTPATIENT
Start: 2024-09-17 | End: 2025-09-17

## 2024-09-17 RX ORDER — KETOROLAC TROMETHAMINE 30 MG/ML
60 INJECTION, SOLUTION INTRAMUSCULAR; INTRAVENOUS
Status: COMPLETED | OUTPATIENT
Start: 2024-09-17 | End: 2024-09-17

## 2024-09-17 RX ORDER — PANTOPRAZOLE SODIUM 40 MG/1
40 TABLET, DELAYED RELEASE ORAL DAILY
Qty: 90 TABLET | Refills: 3 | Status: SHIPPED | OUTPATIENT
Start: 2024-09-17

## 2024-09-17 RX ORDER — LOSARTAN POTASSIUM 25 MG/1
25 TABLET ORAL DAILY
Qty: 90 TABLET | Refills: 3 | Status: SHIPPED | OUTPATIENT
Start: 2024-09-17 | End: 2025-09-17

## 2024-09-17 RX ORDER — FLUCONAZOLE 150 MG/1
150 TABLET ORAL DAILY
Qty: 2 TABLET | Refills: 1 | Status: SHIPPED | OUTPATIENT
Start: 2024-09-17 | End: 2024-09-21

## 2024-09-17 RX ORDER — DICYCLOMINE HYDROCHLORIDE 20 MG/1
20 TABLET ORAL EVERY 6 HOURS
Qty: 120 TABLET | Refills: 0 | Status: SHIPPED | OUTPATIENT
Start: 2024-09-17 | End: 2024-10-17

## 2024-09-17 RX ORDER — PIOGLITAZONEHYDROCHLORIDE 45 MG/1
45 TABLET ORAL DAILY
Qty: 90 TABLET | Refills: 3 | Status: SHIPPED | OUTPATIENT
Start: 2024-09-17

## 2024-09-17 RX ADMIN — KETOROLAC TROMETHAMINE 60 MG: 30 INJECTION, SOLUTION INTRAMUSCULAR; INTRAVENOUS at 10:09

## 2024-09-17 NOTE — PATIENT INSTRUCTIONS
Refill Mounjaro 10mg SQ weekly   Refill Actos 45 mg daily  Fasting Glucose ;  90 min after meals <140; bedtime 100-130  If glucose <100 at bedtime, eat small snack    Diet should be high in fiber with 30-35 gms daily, complex carbs, low saturated/trans fat diet,  Avoid fast foods, sweetened drinks, processed , white bread/pasta/rice/potatoes.  Hydate with 6-8 glasses of water daily.exercise 30 min 5 times per week      Never go barefeet, moisturize feet, avoid cutting toenails too deep  See podiatrist at least annually - refer in [place]   See [facility]mology annually - [facility] [place]     Refer to ortho for left shoulder     Toradol 60mg IM    Get FLU, COVID, Shingrex    Make podiatry (foot) appt at [place]     Schedule MMG at [place]     Start losartan 25 mg po daily  -Check Bps at home weekly and prn symptoms for goal BP <130/80.    eep appt with GI on 11/18 at 8am with Dr. Luna   Avoid Fancy Hands's table salt; use Mrs. Painter or Davion Greer no salt   -Start healthy diet high in fiber (25-35 gm daily), low fat dairy, lean protein, low in saturated/trans fat (minimize cheese, creamy salad dressings); high in calcium/magnesium/potassium; 1.5 gm sodium diet; low in processed sugars and foods, ie  WHITE sugars, bread, pasta, rice, ice cream, cookies, cake, doughnuts  -Drink 6-8 glasses of water daily  -Moderate exercise 30 min 5 times per week or 75 min intense exercise biweekly   -Start 8-10 hour intermittent fasting diet   -Avoid eating 3 hours prior to bedtime  -Reduce alcohol to 1-2 servings (1 serving = 1 oz wine, 1 oz hard liquor, 12 oz beer) per day  -Avoid smoking, vaping, stimulant drugs/mediations ie illicit drugs, pseudo-ephedrine  -Use ADD/ADHD meds sparingly  -Minimize NSAIDs    Start livalo and zetia for cholesterol     Get LDCT for lung cancer screening     FAX eye report to Dr. Paulino at [phone]    RTC every 3 months with armando

## 2024-09-17 NOTE — ASSESSMENT & PLAN NOTE
Refill Mounjaro 10mg SQ weekly   Refill Actos 45 mg daily  Fasting Glucose ;  90 min after meals <140; bedtime 100-130  If glucose <100 at bedtime, eat small snack    POCT glucose today non-fasting 174   Pt encouraged to use sugar substitute, not real sugar     Diet should be high in fiber with 30-35 gms daily, complex carbs, low saturated/trans fat diet,  Avoid fast foods, sweetened drinks, processed , white bread/pasta/rice/potatoes.  Hydate with 6-8 glasses of water daily.exercise 30 min 5 times per week      Keep appt with GI on 11/18 at 8am with Dr. Luna   Never go barefeet, moisturize feet, avoid cutting toenails too deep  See podiatrist at least annually - refer in [place]   See ophthalmology annually - [facility] [place]  - fax visit to Dr. Paulino's office     Consider starting Bentyl 20mg qid

## 2024-09-17 NOTE — ASSESSMENT & PLAN NOTE
RTC every 3 mo  POCT glucose     Refill Mounjaro 10mg SQ weekly   Refill Actos 45 mg daily  Fasting Glucose ;  90 min after meals <140; bedtime 100-130  If glucose <100 at bedtime, eat small snack    Diet should be high in fiber with 30-35 gms daily, complex carbs, low saturated/trans fat diet,  Avoid fast foods, sweetened drinks, processed , white bread/pasta/rice/potatoes.  Hydate with 6-8 glasses of water daily.exercise 30 min 5 times per week      Never go barefeet, moisturize feet, avoid cutting toenails too deep  See podiatrist at least annually - refer in [place]   See ophthalmology annually [facility] [place]       Refer to ortho for left shoulder     Toradol 60mg IM    Get FLU, COVID, Shingrex    Make podiatry (foot) appt at [place]     Schedule MMG at [place]     Start losartan 25 mg po daily  -Check Bps at home weekly and prn symptoms for goal BP <130/80.  Avoid Price's table salt; use Mrs. Painter or Davion Greer no salt   -Start healthy diet high in fiber (25-35 gm daily), low fat dairy, lean protein, low in saturated/trans fat (minimize cheese, creamy salad dressings); high in calcium/magnesium/potassium; 1.5 gm sodium diet; low in processed sugars and foods, ie  WHITE sugars, bread, pasta, rice, ice cream, cookies, cake, doughnuts  -Drink 6-8 glasses of water daily  -Moderate exercise 30 min 5 times per week or 75 min intense exercise biweekly   -Start 8-10 hour intermittent fasting diet   -Avoid eating 3 hours prior to bedtime  -Reduce alcohol to 1-2 servings (1 serving = 1 oz wine, 1 oz hard liquor, 12 oz beer) per day  -Avoid smoking, vaping, stimulant drugs/mediations ie illicit drugs, pseudo-ephedrine  -Use ADD/ADHD meds sparingly  -Minimize NSAIDs    Start livalo and zetia for cholesterol

## 2024-09-17 NOTE — ASSESSMENT & PLAN NOTE
Start Cozaar 25mg po daily   -Check Bps at home weekly and prn symptoms for goal BP <130/80.  Avoid Price's table salt; use Mrs. Painter or Davion Greer no salt   -Start healthy diet high in fiber (25-35 gm daily), low fat dairy, lean protein, low in saturated/trans fat (minimize cheese, creamy salad dressings); high in calcium/magnesium/potassium; 1.5 gm sodium diet; low in processed sugars and foods, ie  WHITE sugars, bread, pasta, rice, ice cream, cookies, cake, doughnuts  -Drink 6-8 glasses of water daily  -Moderate exercise 30 min 5 times per week or 75 min intense exercise biweekly   -Start 8-10 hour intermittent fasting diet   -Avoid eating 3 hours prior to bedtime  -Reduce alcohol to 1-2 servings (1 serving = 1 oz wine, 1 oz hard liquor, 12 oz beer) per day  -Avoid smoking, vaping, stimulant drugs/mediations ie illicit drugs, pseudo-ephedrine  -Use ADD/ADHD meds sparingly  -Minimize NSAIDs

## 2024-09-17 NOTE — PROGRESS NOTES
[facility] Progress Note      Chief Complaint     Follow-up and Diabetes      Subjective:      History of Present Illness:  Aisha Muñoz is a 59 y.o. female had labs 2024     Dry hacking cough all day and night x years: on Lisinopril .    GERD: needs refill of protonix 40mg in am; has GI appt 2024     HTN: needs replacement of lisinopril    Candida vaginitis: needs Diflucan     FH: pancreatic cancer (mom and maternal 1st cousin once removed): referred go genetics with labs drawn 2024 with variant of undetermined significance     Chronic right ankle pain: sees Ortho Claire Zuñiga  last 2024 who recommended RICE, heel lift and PT     DM:  HbA1C 8.8 (8.6) needs Actos 45mg daily; needs refill mounjaro 10mg SQ weekly off x 4 wks; eating baked potatoes, adds sugar 5 tsp in his cofffee, sweetened iced tea; drinks a lot of water  -trying to eat healthier foods ie avoiding fried foods    Mixed HLD: did not tolerate statins due to cramps; insurance did NOT pay for Livalo   HLD: LDL 70, HDL 65, TG 50 on no lipid meds    Tobacco: stopped 1ppd x 14 years; needs LDCT now *smoked x 18 years     Left shoulder pain with decreased ROM x 1 year:  9/10 taking lidoderm 5% patches; no trauma; does not sleep on left side; would like to be evaluated     Chronic bilateral LBP due to spondylosis with sciatica: gets epidural injections by Dr. Villeda prn approx every 3 mo i last seen 2024   -has hx L5-S1 anterior instrumented fusion     SH: works at [employer]   SH: no drinking, drugs, alcohol; 9 children; works at [employer] as a    FH: pancreatic cancer -mom, MGM, maternal aunt all  from it 56 y/o, 50s from pancreatic cancer .     Past Medical History:  Past Medical History:   Diagnosis Date    Acute midline low back pain with right-sided sciatica 2023    Acute viral syndrome 2023    Arthritis     Brain tumor (benign)     Chronic back pain     Diabetes  mellitus     Diabetic gastroparesis associated with type 2 diabetes mellitus 2023    Gastroesophageal reflux disease without esophagitis 2023    Genetic testing 2024    MSH6 variant of uncertain significance, see [facility] results in [facility].    Hypertension     Pain 2024       Past Surgical History:  Past Surgical History:   Procedure Laterality Date    BACK SURGERY      no hardware  fusion    BRAIN SURGERY      benign tumor removed from brain    CARPAL TUNNEL RELEASE Bilateral     COLONOSCOPY N/A 2024    Procedure: COLONOSCOPY;  Surgeon: Alden Luna MD;  Location: [address]);  Service: Endoscopy;  Laterality: N/A;  Ref By Dr. Hudson  Procedure: Colonoscopy  Diagnosis: Screening colonoscopy  procedure timein-12 weeks  Prep: Suflav   Diabetes  24-Precall complete, instr portal, confirmed new arrrival time of 745am and pt holding Mounjaro-DS  24- per message from Eula Lee    HYSTERECTOMY      ovaries in situ       Allergies:  Review of patient's allergies indicates:   Allergen Reactions    Fish containing products Shortness Of Breath, Swelling and Rash    Lisinopril Other (See Comments)     Pt has dry cough continuously    Grape Itching    Strawberries [strawberry] Itching    Banana Itching    Lyrica [pregabalin] Other (See Comments)     headache    Pineapple Hives and Itching       Home Medications:  Current Outpatient Medications   Medication Sig Dispense Refill    acetaminophen (TYLENOL) 325 MG tablet Take 325 mg by mouth as needed for Pain.      albuterol (VENTOLIN HFA) 90 mcg/actuation inhaler Inhale 2 puffs into the lungs every 4 (four) hours as needed for Wheezing. Rescue 18 g 1    diclofenac sodium (VOLTAREN) 1 % Gel 4 grams to effected area 4 times daily do not exceed 32 grams 1 each 0    EPINEPHrine (EPIPEN) 0.3 mg/0.3 mL AtIn Inject into the muscle.      fluticasone propionate (FLONASE) 50 mcg/actuation nasal spray 1 spray (50 mcg total) by Each  Nostril route 2 (two) times daily as needed for Rhinitis. 16 g 3    LIDOcaine (LIDODERM) 5 % SMARTSI-2 Patch(s) Topical Daily PRN      ondansetron (ZOFRAN-ODT) 4 MG TbDL Take 1 tablet (4 mg total) by mouth every 8 (eight) hours as needed (nausea). 90 tablet 3    oxyCODONE-acetaminophen (PERCOCET)  mg per tablet Take 1 tablet by mouth 3 (three) times daily as needed. 90 tablet 0    dicyclomine (BENTYL) 20 mg tablet Take 1 tablet (20 mg total) by mouth every 6 (six) hours. 120 tablet 0    ezetimibe (ZETIA) 10 mg tablet Take 1 tablet (10 mg total) by mouth once daily. 90 tablet 3    fluconazole (DIFLUCAN) 150 MG Tab Take 1 tablet (150 mg total) by mouth once daily. Take one tab po today and repeat in 4 days for 4 days 2 tablet 1    losartan (COZAAR) 25 MG tablet Take 1 tablet (25 mg total) by mouth once daily. 90 tablet 3    pantoprazole (PROTONIX) 40 MG tablet Take 1 tablet (40 mg total) by mouth once daily. 90 tablet 3    pioglitazone (ACTOS) 45 MG tablet Take 1 tablet (45 mg total) by mouth once daily. 90 tablet 3    pitavastatin calcium (LIVALO) 4 mg Tab Take 4 mg by mouth once daily. 90 tablet 3    tirzepatide 10 mg/0.5 mL PnIj Inject 10 mg into the skin every 7 days. 0.5 mL 3     No current facility-administered medications for this visit.        Family History:  Family History   Problem Relation Name Age of Onset    Cirrhosis Mother Constantine     Leukemia Mother Constantine     Cirrhosis Maternal Grandmother      Cirrhosis Other Lyla 30 - 39    Pancreatic cancer Other Lyla 60 - 69    Lupus Daughter      Breast cancer Other      Colon cancer Neg Hx      Ovarian cancer Neg Hx         Social History:  Social History     Tobacco Use    Smoking status: Former     Current packs/day: 0.00     Types: Cigarettes     Quit date: 2013     Years since quitting: 10.8    Smokeless tobacco: Never   Substance Use Topics    Alcohol use: Yes     Comment: occasionally    Drug use: No       Review of Systems:  Pertinent  "positives and negatives listed in HPI. All other systems are reviewed and are negative.    Health Maintaince :   Health Maintenance Topics with due status: Not Due       Topic Last Completion Date    TETANUS VACCINE 05/25/2019    Colorectal Cancer Screening 05/24/2024    Diabetes Urine Screening 08/16/2024    Lipid Panel 08/16/2024    Hemoglobin A1c 08/16/2024           [facility]: this Friday at [facility], Friday on [facility] [place]   Needs foot referral   Dental: all pulled out in March 2023     IHepatitis C:   Cancer Screening:  PAP: UTD   Mammogram: UTD 9/30/2024 at 7:30am   Colonoscopy: UTD5/24/2024  DEXA:  UTD  LDCT: needs    The 10-year ASCVD risk score (Sara BROWN, et al., 2019) is: 8.6%    Values used to calculate the score:      Age: 59 years      Sex: Female      Is Non- : Yes      Diabetic: Yes      Tobacco smoker: No      Systolic Blood Pressure: 122 mmHg      Is BP treated: Yes      HDL Cholesterol: 65 mg/dL      Total Cholesterol: 145 mg/dL      Objective:   /80 (BP Location: Left arm, Patient Position: Sitting, BP Method: Medium (Manual))   Pulse 66   Ht 5' 6" (1.676 m)   Wt 85.9 kg (189 lb 6 oz)   SpO2 98%   BMI 30.57 kg/m²      Body mass index is 30.57 kg/m².       Physical Examination:  General: Alert and awake in no apparent distress  HEENT: Normocephalic and atraumatic; Tms WNL  Eyes:  PERRL; EOMi with anicteric sclera and clear conjunctivae  Mouth:  Oropharynx clear and without exudate; moist mucous membranes  Neck:   Cervical nodes not enlarged (No submental, submandibular, preauricular, posterior auricular or occipital adenopathy); supple; no bruits  Cardio:  Regular rate and rhythm with normal S1 and S2; no murmurs or rubs  Resp:  CTAB; respirations unlabored; no wheezes, crackles or rhonchi  Abdom: Soft, NTND with normoactive bowel sounds; negative HSM  Extrem: Warm and well-perfused with no clubbing, cyanosis or edema  Skin:  No rashes, lesions, or color " changes  Pulses:  2+ and symmetric distally  Neuro:  AAOx3; cooperative and pleasant with no focal deficits    Laboratory:      Most Recent Data:  Lab Results   Component Value Date    WBC 5.98 08/16/2024    HGB 14.0 08/16/2024    HCT 43.1 08/16/2024     08/16/2024    CHOL 145 08/16/2024    TRIG 50 08/16/2024    HDL 65 08/16/2024    ALT 17 08/16/2024    AST 19 08/16/2024     08/16/2024    K 4.2 08/16/2024     08/16/2024    BUN 10 08/16/2024    CO2 21 (L) 08/16/2024    TSH 2.7 08/24/2006    HGBA1C 8.8 (H) 08/16/2024              CBC:   WBC   Date Value Ref Range Status   08/16/2024 5.98 3.90 - 12.70 K/uL Final     Hemoglobin   Date Value Ref Range Status   08/16/2024 14.0 12.0 - 16.0 g/dL Final     Hematocrit   Date Value Ref Range Status   08/16/2024 43.1 37.0 - 48.5 % Final     Platelets   Date Value Ref Range Status   08/16/2024 260 150 - 450 K/uL Final     MCV   Date Value Ref Range Status   08/16/2024 94 82 - 98 fL Final     RDW   Date Value Ref Range Status   08/16/2024 13.0 11.5 - 14.5 % Final     BMP:   Sodium   Date Value Ref Range Status   08/16/2024 138 136 - 145 mmol/L Final     Potassium   Date Value Ref Range Status   08/16/2024 4.2 3.5 - 5.1 mmol/L Final     Chloride   Date Value Ref Range Status   08/16/2024 106 95 - 110 mmol/L Final     CO2   Date Value Ref Range Status   08/16/2024 21 (L) 23 - 29 mmol/L Final     BUN   Date Value Ref Range Status   08/16/2024 10 6 - 20 mg/dL Final     Creatinine   Date Value Ref Range Status   08/16/2024 0.8 0.5 - 1.4 mg/dL Final     Glucose   Date Value Ref Range Status   08/16/2024 159 (H) 70 - 110 mg/dL Final     Calcium   Date Value Ref Range Status   08/16/2024 9.5 8.7 - 10.5 mg/dL Final     Magnesium   Date Value Ref Range Status   08/16/2024 1.6 1.6 - 2.6 mg/dL Final     LFTs:   Total Protein   Date Value Ref Range Status   08/16/2024 6.7 6.0 - 8.4 g/dL Final     Albumin   Date Value Ref Range Status   08/16/2024 3.2 (L) 3.5 - 5.2 g/dL  "Final     Total Bilirubin   Date Value Ref Range Status   08/16/2024 0.6 0.1 - 1.0 mg/dL Final     Comment:     For infants and newborns, interpretation of results should be based  on gestational age, weight and in agreement with clinical  observations.    Premature Infant recommended reference ranges:  Up to 24 hours.............<8.0 mg/dL  Up to 48 hours............<12.0 mg/dL  3-5 days..................<15.0 mg/dL  6-29 days.................<15.0 mg/dL       AST   Date Value Ref Range Status   08/16/2024 19 10 - 40 U/L Final     Alkaline Phosphatase   Date Value Ref Range Status   08/16/2024 77 55 - 135 U/L Final     ALT   Date Value Ref Range Status   08/16/2024 17 10 - 44 U/L Final     Coags: No results found for: "INR", "PROTIME", "PTT"  FLP:      Lab Results   Component Value Date    CHOL 145 08/16/2024    CHOL 159 08/25/2023    CHOL 129 08/24/2006     Lab Results   Component Value Date    HDL 65 08/16/2024    HDL 75 08/25/2023    HDL 30.0 (L) 08/24/2006     Lab Results   Component Value Date    LDLCALC 70.0 08/16/2024    LDLCALC 75.0 08/25/2023    LDLCALC 65.4 08/24/2006     Lab Results   Component Value Date    TRIG 50 08/16/2024    TRIG 45 08/25/2023    TRIG 168 (H) 08/24/2006     Lab Results   Component Value Date    CHOLHDL 44.8 08/16/2024    CHOLHDL 47.2 08/25/2023    CHOLHDL 23.3 08/24/2006      DM:      Hemoglobin A1C   Date Value Ref Range Status   08/16/2024 8.8 (H) 4.0 - 5.6 % Final     Comment:     ADA Screening Guidelines:  5.7-6.4%  Consistent with prediabetes  >or=6.5%  Consistent with diabetes    High levels of fetal hemoglobin interfere with the HbA1C  assay. Heterozygous hemoglobin variants (HbS, HgC, etc)do  not significantly interfere with this assay.   However, presence of multiple variants may affect accuracy.     04/01/2024 8.6 (H) 4.0 - 5.6 % Final     Comment:     ADA Screening Guidelines:  5.7-6.4%  Consistent with prediabetes  >or=6.5%  Consistent with diabetes    High levels of " "fetal hemoglobin interfere with the HbA1C  assay. Heterozygous hemoglobin variants (HbS, HgC, etc)do  not significantly interfere with this assay.   However, presence of multiple variants may affect accuracy.     12/07/2023 8.1 (H) 4.0 - 5.6 % Final     Comment:     ADA Screening Guidelines:  5.7-6.4%  Consistent with prediabetes  >or=6.5%  Consistent with diabetes    High levels of fetal hemoglobin interfere with the HbA1C  assay. Heterozygous hemoglobin variants (HbS, HgC, etc)do  not significantly interfere with this assay.   However, presence of multiple variants may affect accuracy.       LDL Cholesterol   Date Value Ref Range Status   08/16/2024 70.0 63.0 - 159.0 mg/dL Final     Comment:     The National Cholesterol Education Program (NCEP) has set the  following guidelines (reference values) for LDL Cholesterol:  Optimal.......................<130 mg/dL  Borderline High...............130-159 mg/dL  High..........................160-189 mg/dL  Very High.....................>190 mg/dL       Creatinine   Date Value Ref Range Status   08/16/2024 0.8 0.5 - 1.4 mg/dL Final     Thyroid:   TSH   Date Value Ref Range Status   08/24/2006 2.7 0.4 - 4.0 uIU/ml Final     Anemia: No results found for: "IRON", "TIBC", "FERRITIN", "[id number]", "FOLATE"  Cardiac:   Troponin I   Date Value Ref Range Status   05/26/2017 <0.006 0.000 - 0.026 ng/mL Final     Comment:     The reference interval for Troponin I represents the 99th percentile   cutoff   for our facility and is consistent with 3rd generation assay   performance.       BNP   Date Value Ref Range Status   05/26/2017 <10 0 - 99 pg/mL Final     Comment:     Values of less than 100 pg/ml are consistent with non-CHF populations.     Urinalysis:   Urine Culture, Routine   Date Value Ref Range Status   04/23/2007   Final    >100,000 ORGANISMS/ML -ESCHERICHIA COLI  Amikacin             <=16       SENSITIVE     Amox/K Clav          <=8/4      SENSITIVE     Ceftriaxone          " <=8        SENSITIVE     Cefazolin            <=8        SENSITIVE     Ciprofloxacin        <=1        SENSITIVE     Nitrofurantoin       <=32       SENSITIVE     Gentamicin           <=4        SENSITIVE     Bactrim              <=2/38     SENSITIVE     Tetracycline         <=4        SENSITIVE     Timentin             <=16       SENSITIVE     Tobramycin           <=4        SENSITIVE          Color, UA   Date Value Ref Range Status   08/16/2024 Yellow Yellow, Straw, Nelyl Final     Specific Gravity, UA   Date Value Ref Range Status   08/16/2024 1.020 1.005 - 1.030 Final     Nitrite, UA   Date Value Ref Range Status   08/16/2024 Positive (A) Negative Final     Ketones, UA   Date Value Ref Range Status   08/16/2024 Negative Negative Final     Urobilinogen, UA   Date Value Ref Range Status   10/23/2017 1.0 <2.0 EU/dL Final     WBC, UA   Date Value Ref Range Status   08/16/2024 3 0 - 5 /hpf Final       Other Results:  EKG (my interpretation):     Radiology:  X-Ray Ankle Complete Right  EXAMINATION:  XR ANKLE COMPLETE 3 VIEW RIGHT    CLINICAL HISTORY:  Pain in right ankle and joints of right foot    FINDINGS:  Ankle complete three views right.    No fracture dislocation bone destruction or OCD seen.  Ankle mortise is maintained.  There are spurs on the calcaneus.    Electronically signed by: Kirby Bartlett MD  Date:    06/20/2024  Time:    11:26          Assessment/Plan     Aisha Muñoz is a 59 y.o. female with:  1. Preventative health care  Assessment & Plan:  RTC every 3 mo  POCT glucose     Refill Mounjaro 10mg SQ weekly   Refill Actos 45 mg daily  Fasting Glucose ;  90 min after meals <140; bedtime 100-130  If glucose <100 at bedtime, eat small snack    Diet should be high in fiber with 30-35 gms daily, complex carbs, low saturated/trans fat diet,  Avoid fast foods, sweetened drinks, processed , white bread/pasta/rice/potatoes.  Hydate with 6-8 glasses of water daily.exercise 30 min 5 times per week       Never go barefeet, moisturize feet, avoid cutting toenails too deep  See podiatrist at least annually - refer in [place]   See ophthalmology annually - [facility] [place]       Refer to ortho for left shoulder     Toradol 60mg IM    Get FLU, COVID, Sh[facility]rex    Make podiatry (foot) appt at [place]     Schedule MMG at [place]     Start losartan 25 mg po daily  -Check Bps at home weekly and prn symptoms for goal BP <130/80.  Avoid Price's table salt; use Mrs. Painter or Davion Greer no salt   -Start healthy diet high in fiber (25-35 gm daily), low fat dairy, lean protein, low in saturated/trans fat (minimize cheese, creamy salad dressings); high in calcium/magnesium/potassium; 1.5 gm sodium diet; low in processed sugars and foods, ie  WHITE sugars, bread, pasta, rice, ice cream, cookies, cake, doughnuts  -Drink 6-8 glasses of water daily  -Moderate exercise 30 min 5 times per week or 75 min intense exercise biweekly   -Start 8-10 hour intermittent fasting diet   -Avoid eating 3 hours prior to bedtime  -Reduce alcohol to 1-2 servings (1 serving = 1 oz wine, 1 oz hard liquor, 12 oz beer) per day  -Avoid smoking, vaping, stimulant drugs/mediations ie illicit drugs, pseudo-ephedrine  -Use ADD/ADHD meds sparingly  -Minimize NSAIDs    Start livalo and zetia for cholesterol           2. Poorly controlled type 2 diabetes mellitus  -     tirzepatide 10 mg/0.5 mL PnIj; Inject 10 mg into the skin every 7 days.  Dispense: 0.5 mL; Refill: 3  -     pioglitazone (ACTOS) 45 MG tablet; Take 1 tablet (45 mg total) by mouth once daily.  Dispense: 90 tablet; Refill: 3  -     Ambulatory referral/consult to Podiatry; Future; Expected date: 09/17/2024  -     POCT Glucose, Hand-Held Device  -     dicyclomine (BENTYL) 20 mg tablet; Take 1 tablet (20 mg total) by mouth every 6 (six) hours.  Dispense: 120 tablet; Refill: 0    3. Hyperlipidemia associated with type 2 diabetes mellitus  -     pitavastatin calcium  (LIVALO) 4 mg Tab; Take 4 mg by mouth once daily.  Dispense: 90 tablet; Refill: 3  -     ezetimibe (ZETIA) 10 mg tablet; Take 1 tablet (10 mg total) by mouth once daily.  Dispense: 90 tablet; Refill: 3    4. Myalgia due to statin  Assessment & Plan:  Did not tolerate statins in the past      5. History of tobacco use  -     CT Chest Lung Screening Low Dose; Future; Expected date: 09/17/2024    6. Chronic shoulder bursitis, left  Assessment & Plan:  Refer to ortho for evaluation   Use voltaren gel, aspercreme, ice qid x 20 min   Avoid heavy lifting, pushing, sleeping on left side   Toradol 60mg IM today     Orders:  -     Cancel: Ambulatory referral/consult to Orthopedics; Future; Expected date: 09/17/2024  -     ketorolac injection 60 mg  -     Cancel: Ambulatory referral/consult to Orthopedics; Future; Expected date: 09/17/2024  -     Ambulatory referral/consult to Orthopedics; Future; Expected date: 09/17/2024    7. Encounter for screening mammogram for malignant neoplasm of breast    8. Poorly controlled type 2 diabetes mellitus with gastroparesis  Assessment & Plan:  Refill Mounjaro 10mg SQ weekly   Refill Actos 45 mg daily  Fasting Glucose ;  90 min after meals <140; bedtime 100-130  If glucose <100 at bedtime, eat small snack    POCT glucose today non-fasting 174   Pt encouraged to use sugar substitute, not real sugar     Diet should be high in fiber with 30-35 gms daily, complex carbs, low saturated/trans fat diet,  Avoid fast foods, sweetened drinks, processed , white bread/pasta/rice/potatoes.  Hydate with 6-8 glasses of water daily.exercise 30 min 5 times per week      Keep appt with GI on 11/18 at 8am with Dr. Luna   Never go barefeet, moisturize feet, avoid cutting toenails too deep  See podiatrist at least annually - refer in [place]   See ophthalmology annually - [facility] [place]  - fax visit to Dr. Paulino's office     Consider starting Bentyl 20mg qid                9.  Candida vaginitis  Assessment & Plan:  Start diflucan 150mg today and in 4 days      10. Primary hypertension  Assessment & Plan:  Start Cozaar 25mg po daily   -Check Bps at home weekly and prn symptoms for goal BP <130/80.  Avoid Price's table salt; use Mrs. Painter or Davion Greer no salt   -Start healthy diet high in fiber (25-35 gm daily), low fat dairy, lean protein, low in saturated/trans fat (minimize cheese, creamy salad dressings); high in calcium/magnesium/potassium; 1.5 gm sodium diet; low in processed sugars and foods, ie  WHITE sugars, bread, pasta, rice, ice cream, cookies, cake, doughnuts  -Drink 6-8 glasses of water daily  -Moderate exercise 30 min 5 times per week or 75 min intense exercise biweekly   -Start 8-10 hour intermittent fasting diet   -Avoid eating 3 hours prior to bedtime  -Reduce alcohol to 1-2 servings (1 serving = 1 oz wine, 1 oz hard liquor, 12 oz beer) per day  -Avoid smoking, vaping, stimulant drugs/mediations ie illicit drugs, pseudo-ephedrine  -Use ADD/ADHD meds sparingly  -Minimize NSAIDs       Orders:  -     losartan (COZAAR) 25 MG tablet; Take 1 tablet (25 mg total) by mouth once daily.  Dispense: 90 tablet; Refill: 3    11. Chronic bilateral low back pain with bilateral sciatica  Overview:  MRI LS without contrast 5/2020:   Changes of multilevel degenerative facet disease as described.   There is no evidence of high-grade spinal stenosis or direct nerve   root impingement.   2. Changes of previous instrumented fusion at L5-S1.       Assessment & Plan:  Cont epidural injections by Dr. Cano 8/2024 approx every  3 mo       12. 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 Allergy to ACE inhibitors  Assessment & Plan:  Stop Lisinopril and start Losartan 25mg po daily       Other orders  -     fluconazole  (DIFLUCAN) 150 MG Tab; Take 1 tablet (150 mg total) by mouth once daily. Take one tab po today and repeat in 4 days for 4 days  Dispense: 2 tablet; Refill: 1  -     pantoprazole (PROTONIX) 40 MG tablet; Take 1 tablet (40 mg total) by mouth once daily.  Dispense: 90 tablet; Refill: 3             I spent a total of 76 minutes on the day of the visit.This includes face to face time and non-face to face time preparing to see the patient (eg, review of tests), obtaining and/or reviewing separately obtained history, documenting clinical information in the electronic or other health record, independently interpreting results and communicating results to the patient/family/caregiver, or care coordinator.   Code Status:     Tatianna Paulino MD History of Present Illness

## 2024-09-18 NOTE — ASSESSMENT & PLAN NOTE
Refer to ortho for evaluation   Use voltaren gel, aspercreme, ice qid x 20 min   Avoid heavy lifting, pushing, sleeping on left side   Toradol 60mg IM today

## 2024-09-20 LAB
LEFT EYE DM RETINOPATHY: NEGATIVE
RIGHT EYE DM RETINOPATHY: NEGATIVE

## 2024-09-25 ENCOUNTER — TELEPHONE (OUTPATIENT)
Dept: PRIMARY CARE CLINIC | Facility: CLINIC | Age: 59
End: 2024-09-25
Payer: MEDICARE

## 2024-09-26 NOTE — TELEPHONE ENCOUNTER
CELE Solis:  Did you scan Ms. Valencia's recent eye exam at [facility] so Roxy can update her diabetic eye exam?  Thanks!

## 2024-09-26 NOTE — TELEPHONE ENCOUNTER
Pt had eye exam done at [facility] this month - they faxed a copy to our office which Irma has scanned.   Hopefully, you will be able to see it soon.  Dr. CARLSON

## 2024-09-27 ENCOUNTER — PATIENT OUTREACH (OUTPATIENT)
Dept: ADMINISTRATIVE | Facility: HOSPITAL | Age: 59
End: 2024-09-27
Payer: MEDICARE

## 2024-10-15 ENCOUNTER — PATIENT MESSAGE (OUTPATIENT)
Dept: GASTROENTEROLOGY | Facility: CLINIC | Age: 59
End: 2024-10-15
Payer: MEDICARE

## 2024-10-18 ENCOUNTER — HOSPITAL ENCOUNTER (OUTPATIENT)
Dept: RADIOLOGY | Facility: HOSPITAL | Age: 59
Discharge: HOME | End: 2024-10-18
Attending: INTERNAL MEDICINE
Payer: MEDICARE

## 2024-10-18 ENCOUNTER — HOSPITAL ENCOUNTER (OUTPATIENT)
Dept: RADIOLOGY | Facility: HOSPITAL | Age: 59
Discharge: HOME | End: 2024-10-18
Attending: OBSTETRICS & GYNECOLOGY
Payer: MEDICARE

## 2024-10-18 ENCOUNTER — HOSPITAL ENCOUNTER (OUTPATIENT)
Dept: RADIOLOGY | Facility: HOSPITAL | Age: 59
Discharge: HOME | End: 2024-10-18
Attending: ORTHOPAEDIC SURGERY
Payer: MEDICARE

## 2024-10-18 ENCOUNTER — OFFICE VISIT (OUTPATIENT)
Dept: SPORTS MEDICINE | Facility: CLINIC | Age: 59
End: 2024-10-18
Payer: MEDICARE

## 2024-10-18 VITALS
BODY MASS INDEX: 31.85 KG/M2 | HEART RATE: 75 BPM | DIASTOLIC BLOOD PRESSURE: 82 MMHG | HEIGHT: 66 IN | SYSTOLIC BLOOD PRESSURE: 135 MMHG | WEIGHT: 198.19 LBS

## 2024-10-18 VITALS — HEIGHT: 66 IN | WEIGHT: 198 LBS | BODY MASS INDEX: 31.82 KG/M2

## 2024-10-18 DIAGNOSIS — M75.52 CHRONIC SHOULDER BURSITIS, LEFT: ICD-10-CM

## 2024-10-18 DIAGNOSIS — M54.12 CERVICAL RADICULOPATHY: ICD-10-CM

## 2024-10-18 DIAGNOSIS — Z12.31 BREAST CANCER SCREENING BY MAMMOGRAM: ICD-10-CM

## 2024-10-18 DIAGNOSIS — Z87.891 HISTORY OF TOBACCO USE: ICD-10-CM

## 2024-10-18 DIAGNOSIS — M75.52 CHRONIC SHOULDER BURSITIS, LEFT: Primary | ICD-10-CM

## 2024-10-18 PROCEDURE — 3075F SYST BP GE 130 - 139MM HG: CPT | Mod: CPTII,S$GLB,, | Performed by: ORTHOPAEDIC SURGERY

## 2024-10-18 PROCEDURE — 77067 SCR MAMMO BI INCL CAD: CPT | Mod: TC

## 2024-10-18 PROCEDURE — 1159F MED LIST DOCD IN RCRD: CPT | Mod: CPTII,S$GLB,, | Performed by: ORTHOPAEDIC SURGERY

## 2024-10-18 PROCEDURE — 4010F ACE/ARB THERAPY RXD/TAKEN: CPT | Mod: CPTII,S$GLB,, | Performed by: ORTHOPAEDIC SURGERY

## 2024-10-18 PROCEDURE — 3061F NEG MICROALBUMINURIA REV: CPT | Mod: CPTII,S$GLB,, | Performed by: ORTHOPAEDIC SURGERY

## 2024-10-18 PROCEDURE — 3066F NEPHROPATHY DOC TX: CPT | Mod: CPTII,S$GLB,, | Performed by: ORTHOPAEDIC SURGERY

## 2024-10-18 PROCEDURE — 71271 CT THORAX LUNG CANCER SCR C-: CPT | Mod: TC

## 2024-10-18 PROCEDURE — 99204 OFFICE O/P NEW MOD 45 MIN: CPT | Mod: S$GLB,,, | Performed by: ORTHOPAEDIC SURGERY

## 2024-10-18 PROCEDURE — 73030 X-RAY EXAM OF SHOULDER: CPT | Mod: TC,LT

## 2024-10-18 PROCEDURE — 3008F BODY MASS INDEX DOCD: CPT | Mod: CPTII,S$GLB,, | Performed by: ORTHOPAEDIC SURGERY

## 2024-10-18 PROCEDURE — 71271 CT THORAX LUNG CANCER SCR C-: CPT | Mod: 26,,, | Performed by: RADIOLOGY

## 2024-10-18 PROCEDURE — 3052F HG A1C>EQUAL 8.0%<EQUAL 9.0%: CPT | Mod: CPTII,S$GLB,, | Performed by: ORTHOPAEDIC SURGERY

## 2024-10-18 PROCEDURE — 77063 BREAST TOMOSYNTHESIS BI: CPT | Mod: TC

## 2024-10-18 PROCEDURE — 99999 PR PBB SHADOW E&M-EST. PATIENT-LVL IV: CPT | Mod: PBBFAC,,, | Performed by: ORTHOPAEDIC SURGERY

## 2024-10-18 PROCEDURE — 73030 X-RAY EXAM OF SHOULDER: CPT | Mod: 26,LT,, | Performed by: RADIOLOGY

## 2024-10-18 PROCEDURE — 3079F DIAST BP 80-89 MM HG: CPT | Mod: CPTII,S$GLB,, | Performed by: ORTHOPAEDIC SURGERY

## 2024-10-18 RX ORDER — MELOXICAM 7.5 MG/1
7.5 TABLET ORAL DAILY
Qty: 60 TABLET | Refills: 1 | Status: SHIPPED | OUTPATIENT
Start: 2024-10-18
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
## 2024-10-22 NOTE — PROGRESS NOTES
DATE: 2024  PATIENT: Aisha Muñoz    Supervising Physician: Husam Keith M.D.    CHIEF COMPLAINT: neck pain    HISTORY:  Aisha Muñoz is a 59 y.o. female with pmhx of L4-S1 TLIF in  here for initial evaluation of neck and left shoulder pain (Neck - 1, Arm - 5). The pain has been present for about 2 months. The patient describes the pain as dull and sharp. The pain is worse with nothing in particular and improved by nothing. There is associated numbness and tingling. There is subjective weakness. Prior treatments have included tylenol, Lidoderm, but no PT, DAVID or surgery.   The patient denies myelopathic symptoms such as handwriting changes or difficulty with buttons/coins/keys. Denies perineal paresthesias, bowel/bladder dysfunction.    PAST MEDICAL/SURGICAL HISTORY:  Past Medical History:   Diagnosis Date    Acute midline low back pain with right-sided sciatica 2023    Acute viral syndrome 2023    Arthritis     Brain tumor (benign)     Chronic back pain     Diabetes mellitus     Diabetic gastroparesis associated with type 2 diabetes mellitus 2023    Gastroesophageal reflux disease without esophagitis 2023    Genetic testing 2024    MSH6 variant of uncertain significance, see Ambry results in Media.    Hypertension     Pain 2024     Past Surgical History:   Procedure Laterality Date    BACK SURGERY      no hardware  fusion    BRAIN SURGERY      benign tumor removed from brain    CARPAL TUNNEL RELEASE Bilateral     COLONOSCOPY N/A 2024    Procedure: COLONOSCOPY;  Surgeon: Alden Luna MD;  Location: [address]);  Service: Endoscopy;  Laterality: N/A;  Ref By Dr. Hudson  Procedure: Colonoscopy  Diagnosis: Screening colonoscopy  procedure timein-12 weeks  Prep: Suflav   Diabetes  24-Precall complete, instr portal, confirmed new arrrival time of 745am and pt holding Mounjaro-DS  24- per message from Eula CASH       ovaries in situ       Medications:  Current Outpatient Medications on File Prior to Visit   Medication Sig Dispense Refill    acetaminophen (TYLENOL) 325 MG tablet Take 325 mg by mouth as needed for Pain.      albuterol (VENTOLIN HFA) 90 mcg/actuation inhaler Inhale 2 puffs into the lungs every 4 (four) hours as needed for Wheezing. Rescue 18 g 1    diclofenac sodium (VOLTAREN) 1 % Gel 4 grams to effected area 4 times daily do not exceed 32 grams 1 each 0    EPINEPHrine (EPIPEN) 0.3 mg/0.3 mL AtIn Inject into the muscle.      ezetimibe (ZETIA) 10 mg tablet Take 1 tablet (10 mg total) by mouth once daily. 90 tablet 3    fluticasone propionate (FLONASE) 50 mcg/actuation nasal spray 1 spray (50 mcg total) by Each Nostril route 2 (two) times daily as needed for Rhinitis. 16 g 3    LIDOcaine (LIDODERM) 5 % SMARTSI-2 Patch(s) Topical Daily PRN      losartan (COZAAR) 25 MG tablet Take 1 tablet (25 mg total) by mouth once daily. 90 tablet 3    ondansetron (ZOFRAN-ODT) 4 MG TbDL Take 1 tablet (4 mg total) by mouth every 8 (eight) hours as needed (nausea). 90 tablet 3    oxyCODONE-acetaminophen (PERCOCET)  mg per tablet Take 1 tablet by mouth 3 (three) times daily as needed. 90 tablet 0    pantoprazole (PROTONIX) 40 MG tablet Take 1 tablet (40 mg total) by mouth once daily. 90 tablet 3    pioglitazone (ACTOS) 45 MG tablet Take 1 tablet (45 mg total) by mouth once daily. 90 tablet 3    pitavastatin calcium (LIVALO) 4 mg Tab Take 4 mg by mouth once daily. 90 tablet 3    tirzepatide 10 mg/0.5 mL PnIj Inject 10 mg into the skin every 7 days. 0.5 mL 3    [DISCONTINUED] meloxicam (MOBIC) 7.5 MG tablet Take 1 tablet (7.5 mg total) by mouth once daily. 60 tablet 1     No current facility-administered medications on file prior to visit.       Social History:   Social History     Socioeconomic History    Marital status:    Tobacco Use    Smoking status: Former     Current packs/day: 0.00     Types: Cigarettes      "Quit date: 2013     Years since quittin.0    Smokeless tobacco: Never   Substance and Sexual Activity    Alcohol use: Yes     Comment: occasionally    Drug use: No    Sexual activity: Yes     Partners: Male     Birth control/protection: None     Social Drivers of Health     Financial Resource Strain: High Risk (2024)    Overall Financial Resource Strain (CARDIA)     Difficulty of Paying Living Expenses: Hard   Food Insecurity: No Food Insecurity (2024)    Hunger Vital Sign     Worried About Running Out of Food in the Last Year: Never true     Ran Out of Food in the Last Year: Never true   Physical Activity: Unknown (2024)    Exercise Vital Sign     Days of Exercise per Week: 7 days   Stress: Stress Concern Present (2024)    [place] [employer] - Occupational Stress Questionnaire     Feeling of Stress : Rather much   Housing Stability: Unknown (2024)    Housing Stability Vital Sign     Unable to Pay for Housing in the Last Year: No       REVIEW OF SYSTEMS:  Constitution: Negative. Negative for chills, fever and night sweats.   Cardiovascular: Negative for chest pain and syncope.   Respiratory: Negative for cough and shortness of breath.   Gastrointestinal: See HPI. Negative for nausea/vomiting. Negative for abdominal pain.  Genitourinary: See HPI. Negative for discoloration or dysuria.  Skin: Negative for dry skin, itching and rash.   Hematologic/Lymphatic: Negative for bleeding problem. Does not bruise/bleed easily.   Musculoskeletal: Negative for falls and muscle weakness.   Neurological: See HPI. No seizures.   Endocrine: Negative for polydipsia, polyphagia and polyuria.   Allergic/Immunologic: Negative for hives and persistent infections.  Psychiatric/Behavioral: Negative for depression and insomnia.         EXAM:  Ht 5' 4" (1.626 m)   Wt 89.4 kg (197 lb)   BMI 33.81 kg/m²     General: The patient is a 59 y.o. female in no apparent distress, the patient is " oriented to person, place and time.  Psych: Normal mood and affect  HEENT: Vision grossly intact, hearing intact to the spoken word.  Lungs: Respirations unlabored.  Gait: Normal station and gait, no difficulty with toe or heel walk.   Skin: Cervical skin negative for rashes, lesions, hairy patches and surgical scars.  Range of motion: Cervical range of motion is acceptable. There is moderate tenderness to palpation.  Spinal Balance: Global saggital and coronal spinal balance acceptable, no significant for scoliosis and kyphosis.  Musculoskeletal: No pain with the range of motion of the bilateral shoulders and elbows. Normal bulk and contour of the bilateral hands.  Vascular: Bilateral hands warm and well perfused, radial pulses 2+ bilaterally.  Neurological: Normal strength and tone in all major motor groups in the bilateral upper and lower extremities. decreased sensation to light touch in the C5-T1 dermatomes in the LUE.  Deep tendon reflexes symmetric 2+ in the bilateral upper and lower extremities.  Negative Inverted Radial Reflex and Starks's bilaterally. Negative Babinski bilaterally.     IMAGING:   Today I personally reviewed AP, Lat and Flex/Ex  upright C-spine films that demonstrate mild DDD at C5-6.      Body mass index is 33.81 kg/m².    Hemoglobin A1C   Date Value Ref Range Status   08/16/2024 8.8 (H) 4.0 - 5.6 % Final     Comment:     ADA Screening Guidelines:  5.7-6.4%  Consistent with prediabetes  >or=6.5%  Consistent with diabetes    High levels of fetal hemoglobin interfere with the HbA1C  assay. Heterozygous hemoglobin variants (HbS, HgC, etc)do  not significantly interfere with this assay.   However, presence of multiple variants may affect accuracy.     04/01/2024 8.6 (H) 4.0 - 5.6 % Final     Comment:     ADA Screening Guidelines:  5.7-6.4%  Consistent with prediabetes  >or=6.5%  Consistent with diabetes    High levels of fetal hemoglobin interfere with the HbA1C  assay. Heterozygous  hemoglobin variants (HbS, HgC, etc)do  not significantly interfere with this assay.   However, presence of multiple variants may affect accuracy.     12/07/2023 8.1 (H) 4.0 - 5.6 % Final     Comment:     ADA Screening Guidelines:  5.7-6.4%  Consistent with prediabetes  >or=6.5%  Consistent with diabetes    High levels of fetal hemoglobin interfere with the HbA1C  assay. Heterozygous hemoglobin variants (HbS, HgC, etc)do  not significantly interfere with this assay.   However, presence of multiple variants may affect accuracy.             ASSESSMENT/PLAN:    Aisha was seen today for neck pain and shoulder pain.    Diagnoses and all orders for this visit:    DDD (degenerative disc disease), cervical  -     gabapentin (NEURONTIN) 100 MG capsule; Take 1-3 capsules (100-300 mg total) by mouth every evening.  -     meloxicam (MOBIC) 15 MG tablet; Take 1 tablet (15 mg total) by mouth once daily.  -     MRI Cervical Spine Without Contrast; Future  -     tiZANidine (ZANAFLEX) 4 MG tablet; Take 1 tablet (4 mg total) by mouth nightly as needed (muscle tension).    Cervical radiculopathy  -     Ambulatory referral/consult to [facility]  -     gabapentin (NEURONTIN) 100 MG capsule; Take 1-3 capsules (100-300 mg total) by mouth every evening.  -     meloxicam (MOBIC) 15 MG tablet; Take 1 tablet (15 mg total) by mouth once daily.  -     MRI Cervical Spine Without Contrast; Future  -     tiZANidine (ZANAFLEX) 4 MG tablet; Take 1 tablet (4 mg total) by mouth nightly as needed (muscle tension).      Today we discussed at length all of the different treatment options including anti-inflammatories, acetaminophen, rest, ice, heat, physical therapy including strengthening and stretching exercises, home exercises, ROM, aerobic conditioning, aqua therapy, other modalities including ultrasound, massage, and dry needling, epidural steroid injections and finally surgical intervention.  MRI ordered, I will call with results

## 2024-10-25 ENCOUNTER — PATIENT MESSAGE (OUTPATIENT)
Dept: PODIATRY | Facility: CLINIC | Age: 59
End: 2024-10-25
Payer: MEDICARE

## 2024-10-29 ENCOUNTER — TELEPHONE (OUTPATIENT)
Dept: PODIATRY | Facility: CLINIC | Age: 59
End: 2024-10-29
Payer: MEDICARE

## 2024-11-01 ENCOUNTER — OFFICE VISIT (OUTPATIENT)
Dept: GASTROENTEROLOGY | Facility: CLINIC | Age: 59
End: 2024-11-01
Payer: MEDICARE

## 2024-11-01 VITALS
WEIGHT: 201.06 LBS | BODY MASS INDEX: 34.33 KG/M2 | HEART RATE: 81 BPM | DIASTOLIC BLOOD PRESSURE: 86 MMHG | HEIGHT: 64 IN | SYSTOLIC BLOOD PRESSURE: 157 MMHG

## 2024-11-01 DIAGNOSIS — R13.10 DYSPHAGIA, UNSPECIFIED TYPE: Primary | ICD-10-CM

## 2024-11-01 DIAGNOSIS — Z80.0 FAMILY HISTORY OF PANCREATIC CANCER: ICD-10-CM

## 2024-11-01 PROCEDURE — 3061F NEG MICROALBUMINURIA REV: CPT | Mod: CPTII,S$GLB,,

## 2024-11-01 PROCEDURE — 99213 OFFICE O/P EST LOW 20 MIN: CPT | Mod: S$GLB,,,

## 2024-11-01 PROCEDURE — 4010F ACE/ARB THERAPY RXD/TAKEN: CPT | Mod: CPTII,S$GLB,,

## 2024-11-01 PROCEDURE — 3008F BODY MASS INDEX DOCD: CPT | Mod: CPTII,S$GLB,,

## 2024-11-01 PROCEDURE — 3077F SYST BP >= 140 MM HG: CPT | Mod: CPTII,S$GLB,,

## 2024-11-01 PROCEDURE — 3052F HG A1C>EQUAL 8.0%<EQUAL 9.0%: CPT | Mod: CPTII,S$GLB,,

## 2024-11-01 PROCEDURE — 99999 PR PBB SHADOW E&M-EST. PATIENT-LVL IV: CPT | Mod: PBBFAC,,,

## 2024-11-01 PROCEDURE — 3066F NEPHROPATHY DOC TX: CPT | Mod: CPTII,S$GLB,,

## 2024-11-01 PROCEDURE — 3079F DIAST BP 80-89 MM HG: CPT | Mod: CPTII,S$GLB,,

## 2024-11-01 NOTE — PROGRESS NOTES
[facility]    Reason for Visit:  The primary encounter diagnosis was Dysphagia, unspecified type. A diagnosis of Family history of pancreatic cancer was also pertinent to this visit.    PCP:   Tatianna Paulino   [address] / [place] [id number]      Initial HPI   This is a 59 y.o. female presenting for discussion regarding need for pancreatic cancer screening. She was referred to to genetic testing done d/t extensive family history of cancer. At that time, she reported having multiple relatives with history of pancreatic cancer. Today she reports that she was mistaken and she only has one maternal aunt with history of pancreatic cancer. She recently found out the other two had breast cancer. Her genetic testing resulted in one mutation of unknown significance. 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 Denies symptoms such as abdominal pain, NVD, fever, jaundice, unintentional weight loss. Denies history of pancreatitis. Denies tobacco use.     She additionally reports a few year history of solid and liquid dysphagia. Denies odynophagia. She has a history of GERD on protonix. She has never had this dysphagia addressed     ROS:  Review of Systems   Constitutional:  Negative for chills, fever and weight loss.   Gastrointestinal:  Positive for heartburn. Negative for abdominal pain, blood in stool, constipation, diarrhea, melena, nausea and vomiting.        Medical History:  has a past medical history of Acute midline low back pain with right-sided sciatica (06/07/2023), Acute viral syndrome (12/07/2023), Arthritis, Brain tumor (benign) (1997), Chronic back pain, Diabetes mellitus, Diabetic gastroparesis associated with type 2 diabetes mellitus (08/25/2023), Gastroesophageal reflux disease without esophagitis (08/25/2023), Genetic testing (06/2024), Hypertension, and Pain (07/19/2024).    Surgical History:  has a past  surgical history that includes Hysterectomy; Carpal tunnel release (Bilateral); Back surgery; Brain surgery (); and Colonoscopy (N/A, 2024).    Family History: family history includes Breast cancer in an other family member; Cirrhosis in her maternal grandmother and mother; Cirrhosis (age of onset: 30 - 39) in an other family member; Leukemia in her mother; Lupus in her daughter; Pancreatic cancer (age of onset: 60 - 69) in an other family member..       Review of patient's allergies indicates:   Allergen Reactions    Fish containing products Shortness Of Breath, Swelling and Rash    Lisinopril Other (See Comments)     Pt has dry cough continuously    Grape Itching    Strawberries [strawberry] Itching    Banana Itching    Lyrica [pregabalin] Other (See Comments)     headache    Pineapple Hives and Itching       Current Outpatient Medications on File Prior to Visit   Medication Sig Dispense Refill    acetaminophen (TYLENOL) 325 MG tablet Take 325 mg by mouth as needed for Pain.      albuterol (VENTOLIN HFA) 90 mcg/actuation inhaler Inhale 2 puffs into the lungs every 4 (four) hours as needed for Wheezing. Rescue 18 g 1    diclofenac sodium (VOLTAREN) 1 % Gel 4 grams to effected area 4 times daily do not exceed 32 grams 1 each 0    EPINEPHrine (EPIPEN) 0.3 mg/0.3 mL AtIn Inject into the muscle.      ezetimibe (ZETIA) 10 mg tablet Take 1 tablet (10 mg total) by mouth once daily. 90 tablet 3    fluticasone propionate (FLONASE) 50 mcg/actuation nasal spray 1 spray (50 mcg total) by Each Nostril route 2 (two) times daily as needed for Rhinitis. 16 g 3    LIDOcaine (LIDODERM) 5 % SMARTSI-2 Patch(s) Topical Daily PRN      losartan (COZAAR) 25 MG tablet Take 1 tablet (25 mg total) by mouth once daily. 90 tablet 3    ondansetron (ZOFRAN-ODT) 4 MG TbDL Take 1 tablet (4 mg total) by mouth every 8 (eight) hours as needed (nausea). 90 tablet 3    oxyCODONE-acetaminophen (PERCOCET)  mg per tablet Take 1 tablet  by mouth 3 (three) times daily as needed. 90 tablet 0    pantoprazole (PROTONIX) 40 MG tablet Take 1 tablet (40 mg total) by mouth once daily. 90 tablet 3    pioglitazone (ACTOS) 45 MG tablet Take 1 tablet (45 mg total) by mouth once daily. 90 tablet 3    pitavastatin calcium (LIVALO) 4 mg Tab Take 4 mg by mouth once daily. 90 tablet 3    tirzepatide 10 mg/0.5 mL PnIj Inject 10 mg into the skin every 7 days. 0.5 mL 3    [DISCONTINUED] meloxicam (MOBIC) 7.5 MG tablet Take 1 tablet (7.5 mg total) by mouth once daily. 60 tablet 1     No current facility-administered medications on file prior to visit.           Physical Exam:  Physical Exam  Constitutional:       General: She is not in acute distress.     Appearance: Normal appearance. She is not ill-appearing.   Eyes:      General: No scleral icterus.  Abdominal:      General: Abdomen is flat. Bowel sounds are normal. There is no distension.      Palpations: Abdomen is soft. There is no mass.      Tenderness: There is no abdominal tenderness. There is no guarding or rebound.      Hernia: No hernia is present.   Skin:     General: Skin is warm and dry.      Coloration: Skin is not jaundiced.   Neurological:      Mental Status: She is alert and oriented to person, place, and time.             Labs:  Lab Results   Component Value Date    WBC 5.98 08/16/2024    HGB 14.0 08/16/2024    HCT 43.1 08/16/2024     08/16/2024    CHOL 145 08/16/2024    TRIG 50 08/16/2024    HDL 65 08/16/2024    ALKPHOS 77 08/16/2024    LIPASE 40 10/23/2017    ALT 17 08/16/2024    AST 19 08/16/2024     08/16/2024    K 4.2 08/16/2024     08/16/2024    CREATININE 0.8 08/16/2024    BUN 10 08/16/2024    CO2 21 (L) 08/16/2024    TSH 2.7 08/24/2006    HGBA1C 8.8 (H) 08/16/2024       Imaging reviewed:      Endoscopy reviewed:      Assessment:  1. Dysphagia, unspecified type    2. Family history of pancreatic cancer             Recommendations:  Genetic mutations/variants are classified  as benign (harmless), pathogenic (harmful, disease-causing), or uncertain significance. Uncertain means there's an abnormality in the gene but there is insufficient evidence to determine if the abnormality affects the function of the gene and results in an increased risk for cancer or other disease. No action is needed for a variant of uncertain significance. She also reports only one second degree relative with hx of pancreatic cancer. I do not believe this patient requires pancreatic cancer screening at this time  Approximately 90% of all VUS's are reclassified as benign. Therefore, no clinical decisions should be made based on a VUS, even if the variant seems to explain the cancers in the individual and/or family. In most cases, this is just a coincidence.   Placed a referral to general GI for dysphagia and ordered EGD          Thank you so much for allowing me to participate in the care of Aisha Tong PA-C  Advanced Endoscopy Physician Assistant  [facility]

## 2024-11-07 ENCOUNTER — TELEPHONE (OUTPATIENT)
Dept: ORTHOPEDICS | Facility: CLINIC | Age: 59
End: 2024-11-07
Payer: MEDICARE

## 2024-11-07 DIAGNOSIS — M50.30 DDD (DEGENERATIVE DISC DISEASE), CERVICAL: Primary | ICD-10-CM

## 2024-11-07 NOTE — TELEPHONE ENCOUNTER
Spoke to patient regarding x-ray. Patient is aware of x-ray scheduled for 8:45 am, but will arrive for 9:00 am at the [facility] [place]. Patient stated thank you. Thanks.

## 2024-11-08 ENCOUNTER — OFFICE VISIT (OUTPATIENT)
Dept: ORTHOPEDICS | Facility: CLINIC | Age: 59
End: 2024-11-08
Payer: MEDICARE

## 2024-11-08 ENCOUNTER — TELEPHONE (OUTPATIENT)
Dept: PODIATRY | Facility: CLINIC | Age: 59
End: 2024-11-08
Payer: MEDICARE

## 2024-11-08 ENCOUNTER — HOSPITAL ENCOUNTER (OUTPATIENT)
Dept: RADIOLOGY | Facility: HOSPITAL | Age: 59
Discharge: HOME | End: 2024-11-08
Attending: REGISTERED NURSE
Payer: MEDICARE

## 2024-11-08 VITALS — HEIGHT: 64 IN | WEIGHT: 197 LBS | BODY MASS INDEX: 33.63 KG/M2

## 2024-11-08 DIAGNOSIS — M50.30 DDD (DEGENERATIVE DISC DISEASE), CERVICAL: Primary | ICD-10-CM

## 2024-11-08 DIAGNOSIS — M50.30 DDD (DEGENERATIVE DISC DISEASE), CERVICAL: ICD-10-CM

## 2024-11-08 DIAGNOSIS — M54.12 CERVICAL RADICULOPATHY: ICD-10-CM

## 2024-11-08 PROCEDURE — 72050 X-RAY EXAM NECK SPINE 4/5VWS: CPT | Mod: TC

## 2024-11-08 PROCEDURE — 99999 PR PBB SHADOW E&M-EST. PATIENT-LVL III: CPT | Mod: PBBFAC,,, | Performed by: REGISTERED NURSE

## 2024-11-08 PROCEDURE — 72050 X-RAY EXAM NECK SPINE 4/5VWS: CPT | Mod: 26,,, | Performed by: RADIOLOGY

## 2024-11-08 RX ORDER — GABAPENTIN 100 MG/1
100-300 CAPSULE ORAL NIGHTLY
Qty: 90 CAPSULE | Refills: 1 | Status: SHIPPED | OUTPATIENT
Start: 2024-11-08 | End: 2025-01-07

## 2024-11-08 RX ORDER — TIZANIDINE 4 MG/1
4 TABLET ORAL NIGHTLY PRN
Qty: 60 TABLET | Refills: 0 | Status: SHIPPED | OUTPATIENT
Start: 2024-11-08

## 2024-11-08 RX ORDER — MELOXICAM 15 MG/1
15 TABLET ORAL DAILY
Qty: 14 TABLET | Refills: 0 | Status: SHIPPED | OUTPATIENT
Start: 2024-11-08

## 2024-11-08 NOTE — TELEPHONE ENCOUNTER
Call pt. To rescheduled appointment with dr. Joel on 11/15/2024 due to her being out of office.pt. verbally confirmed new appointment date and time. New appointment will be mailed out.

## 2024-11-13 NOTE — H&P (VIEW-ONLY)
Established Patient - Audio Only Telehealth Visit     The patient location is: [place]  The chief complaint leading to consultation is: MRI results  Visit type: Virtual visit with audio only (telephone)  Total time spent with patient: 15min     The reason for the audio only service rather than synchronous audio and video virtual visit was related to technical difficulties or patient preference/necessity.     Each patient to whom I provide medical services by telemedicine is:  (1) informed of the relationship between the physician and patient and the respective role of any other health care provider with respect to management of the patient; and (2) notified that they may decline to receive medical services by telemedicine and may withdraw from such care at any time. Patient verbally consented to receive this service via voice-only telephone call.    DATE: 11/26/2024  PATIENT: Aisha Muñoz    Attending Physician: Husam Keith M.D.    HISTORY:  Aisha Muñoz is a 59 y.o. female who returns to me today for MRI results.  She was last seen by me 11/8/2024.  Today she is doing well but notes neck and left shoulder pain (Neck - 1, Arm - 5).    The Patient denies myelopathic symptoms such as handwriting changes or difficulty with buttons/coins/keys. Denies perineal paresthesias, bowel/bladder dysfunction.    IMAGING:  Today I personally re-reviewed AP, Lat and Flex/Ex  upright C-spine films that demonstrate mild DDD at C5-6.      Cervical MRI shows moderate stenosis at C3-4 with multilevel foraminal narrowing    There is no height or weight on file to calculate BMI.    Hemoglobin A1C   Date Value Ref Range Status   08/16/2024 8.8 (H) 4.0 - 5.6 % Final     Comment:     ADA Screening Guidelines:  5.7-6.4%  Consistent with prediabetes  >or=6.5%  Consistent with diabetes    High levels of fetal hemoglobin interfere with the HbA1C  assay. Heterozygous hemoglobin variants (HbS, HgC, etc)do  not significantly interfere with  this assay.   However, presence of multiple variants may affect accuracy.     04/01/2024 8.6 (H) 4.0 - 5.6 % Final     Comment:     ADA Screening Guidelines:  5.7-6.4%  Consistent with prediabetes  >or=6.5%  Consistent with diabetes    High levels of fetal hemoglobin interfere with the HbA1C  assay. Heterozygous hemoglobin variants (HbS, HgC, etc)do  not significantly interfere with this assay.   However, presence of multiple variants may affect accuracy.     12/07/2023 8.1 (H) 4.0 - 5.6 % Final     Comment:     ADA Screening Guidelines:  5.7-6.4%  Consistent with prediabetes  >or=6.5%  Consistent with diabetes    High levels of fetal hemoglobin interfere with the HbA1C  assay. Heterozygous hemoglobin variants (HbS, HgC, etc)do  not significantly interfere with this assay.   However, presence of multiple variants may affect accuracy.           ASSESSMENT/PLAN:    Diagnoses and all orders for this visit:    Cervical radiculopathy  -     Procedure Order to Pain Management; Future      DAVID ordered; follow up 2 weeks after if pain  persists.      This service was not originating from a related E/M service provided within the previous 7 days nor will  to an E/M service or procedure within the next 24 hours or my soonest available appointment.  Prevailing standard of care was able to be met in this audio-only visit.

## 2024-11-15 ENCOUNTER — HOSPITAL ENCOUNTER (OUTPATIENT)
Dept: RADIOLOGY | Facility: HOSPITAL | Age: 59
Discharge: HOME | End: 2024-11-15
Attending: REGISTERED NURSE
Payer: MEDICARE

## 2024-11-15 DIAGNOSIS — M50.30 DDD (DEGENERATIVE DISC DISEASE), CERVICAL: ICD-10-CM

## 2024-11-15 DIAGNOSIS — M54.12 CERVICAL RADICULOPATHY: ICD-10-CM

## 2024-11-15 PROCEDURE — 72141 MRI NECK SPINE W/O DYE: CPT | Mod: TC

## 2024-11-15 PROCEDURE — 72141 MRI NECK SPINE W/O DYE: CPT | Mod: 26,,, | Performed by: RADIOLOGY

## 2024-11-19 RX ORDER — FLUCONAZOLE 150 MG/1
TABLET ORAL
Qty: 2 TABLET | Refills: 5 | Status: SHIPPED | OUTPATIENT
Start: 2024-11-19

## 2024-11-19 NOTE — TELEPHONE ENCOUNTER
Refill Routing Note   Medication(s) are not appropriate for processing by [facility] for the following reason(s):        Outside of protocol    [facility] action(s):  Route               Appointments  past 12m or future 3m with PCP    Date Provider   Last Visit   9/17/2024 Tatianna Paulino MD   Next Visit   12/20/2024 Tatianna Paulino MD   ED visits in past 90 days: 0        Note composed:9:18 AM 11/19/2024

## 2024-11-20 DIAGNOSIS — E11.9 TYPE 2 DIABETES MELLITUS WITHOUT COMPLICATION: ICD-10-CM

## 2024-11-21 ENCOUNTER — TELEPHONE (OUTPATIENT)
Dept: GASTROENTEROLOGY | Facility: CLINIC | Age: 59
End: 2024-11-21
Payer: MEDICARE

## 2024-11-26 ENCOUNTER — OFFICE VISIT (OUTPATIENT)
Dept: ORTHOPEDICS | Facility: CLINIC | Age: 59
End: 2024-11-26
Payer: MEDICARE

## 2024-11-26 ENCOUNTER — TELEPHONE (OUTPATIENT)
Dept: PAIN MEDICINE | Facility: CLINIC | Age: 59
End: 2024-11-26
Payer: MEDICARE

## 2024-11-26 DIAGNOSIS — M54.12 CERVICAL RADICULOPATHY: Primary | ICD-10-CM

## 2024-11-26 NOTE — TELEPHONE ENCOUNTER
----- Message from AMALIA Napoles NP sent at 2024  9:27 AM CST -----  Regarding: Order for ESSIE DICKSON    Patient Name: ESSIE DICKSON([id number])  Sex: Female  : 1965      PCP: CEM CARLSON    Center: [facility] [place]     Level of Service:44811     [place] OFFICE/OUTPT VISIT, EST, LEVL III, 20-29 MIN    Types of orders made on 2024: Procedure Request    Order Date:2024  Ordering User:ALEXANDR NAPOLES [[id number]]  Encounter Provider:AMALIA Napoles NP [[id number]]  Authorizing Provider: AMALIA Napoles NP [[id number]]  Supervising Provider:MARIO AVILA [[id number]]  Type of Supervision:Collaborating Physician  Department:[facility][[id number]]    Common Order Information  Procedure -> Epidural Injection (specify level) Cmt: C7-T1    Order Specific Information  Order: Procedure Order to Pain Management [Custom: [id number]]  Order #:          [id number]: 1 FUTURE    Priority: Routine  Class: Clinic Performed    Future Order Information      Expires on:2025            Expected by:2024                   Associated Diagnoses      M54.12 Cervical radiculopathy      Facility Name: -> [facility] [place]           Priority: Routine  Class: Clinic Performed    Future Order Information      Expires on:2025            Expected by:2024                   Associated Diagnoses      M54.12 Cervical radiculopathy      Procedure -> Epidural Injection (specify level) Cmt: C7-T1        Facility Name: -> [facility] [place]

## 2024-12-13 ENCOUNTER — TELEPHONE (OUTPATIENT)
Dept: PAIN MEDICINE | Facility: CLINIC | Age: 59
End: 2024-12-13
Payer: MEDICARE

## 2024-12-18 NOTE — PRE-PROCEDURE INSTRUCTIONS
Unable to reach pt via phone.  Left voicemail with arrival time also informing pt of need for responsible  accompaniment and instructing pt to follow pre-procedure instructions provided via [facility] portal.  The following message was sent to pt's portal.      Dear Aisha,     Please read over the following pre-procedure instructions in it's entirety as there is helpful information here to get you well prepared for your upcoming procedure.     You are scheduled for a procedure with Dr. Neri on 12/20/2024.     [facility] at the corner of [facility] and [facility]. It is in the [place] next to [employer]. The address is: [address] [place] [address]. Take the elevator to the 2nd floor.       Registration check in time: 8:40 am  Procedure scheduled for time: 9:40 am     If you are receiving sedation, you CANNOT drive yourself and must have a responsible friend or family member (no rideshare) to drive you home.        You should take any medications that you routinely take for blood pressure, heart medications, thyroid, cholesterol, etc.      The fasting restrictions are dependent on whether or not you are receiving sedation. Sedation is not available for all procedures.      Your fasting instructions/Sedation type are as follow:  IV sedation.   Nothing to eat after midnight the night prior to procedure.   Patients are encouraged to consume clear liquids up to 2 hours prior to scheduled arrival time.  -Clear liquids include Gatorade, water, soda, black coffee or tea (no milk or creamer), and clear juices. - Clear liquids do NOT include anything with pulp or food particles (chicken broth, ice cream, yogurt, Jello, etc.) You CANNOT drive yourself and must have a .           If you are on blood thinners, you need to follow the anticoagulation instructions that had been discussed previously. You should only stop the blood thinners if it was approved by your primary care physician or  your cardiologist. In the event that you are not able to stop your blood thinners, a blood thinner was not listed on your medication list, or we were not able to get clearance from your cardiologist, then the procedure may have to be postponed/canceled.      IF you were told to stop your blood thinners, this is how long you should generally hold some of the more common ones. Remember that stopping blood thinners is only necessary for certain procedures. If you are unsure of your instructions, please call us.   Aspirin - 5 days  Plavix/Clopidogrel - 7 days  Warfarin / Coumadin - 5 days  Eliquis - 3 days  Pradaxa/Dabigatran - 4 days  Xarelto/Rivaroxaban - 3 days     If you are a diabetic, do not take your medication if you will be fasting, but bring it with you. Please plan on being here for roughly 2-3 hours.      HOLD all non-insulin injections (shots) until after surgery (Ozempic, Mounjaro, Trulicity, Victoza, Byetta, Wegovy and Adlyxin) (Total of 7 days prior)       Please call us if you have been sick (running fever, having any flu-like symptoms) or have been taking ANTIBIOTICS in the past 2 weeks or had any outpatient procedures other than with us (colonoscopy, endoscopy, OBGYN, dental, etc.).      If you have been previously COVID positive, you will need to hold off on your procedure until you are symptom free for 10 days. If you did not have any symptoms, you can have your procedure 10 days from your positive test result.         On the morning of your procedure:  *HOLD ALL VITAMINS, MINERALS, HERBS (INCLUDING HERBAL TEAS) AND SUPPLEMENTS  *SHOWER WITH ANTIBACTERIAL SOAP (EX. DIAL) NIGHT BEFORE AND MORNING OF PROCEDURE  *DO NOT APPLY ANY LOTIONS, OILS, POWDERS, PERFUME/COLOGNE, OINTMENTS, GELS, CREAMS, MAKEUP OR DEODORANT TO YOUR SKIN MORNING OF PROCEDURE  *LEAVE JEWELRY AND ANY VALUABLES AT HOME  *WEAR LOOSE COMFORTABLE CLOTHING         Please reply to this portal message as receipt of delivery.     Thank  you,  [facility]  [place], LPN [facility]  Pre-Admit

## 2024-12-20 ENCOUNTER — HOSPITAL ENCOUNTER (OUTPATIENT)
Facility: HOSPITAL | Age: 59
Discharge: HOME | End: 2024-12-20
Attending: STUDENT IN AN ORGANIZED HEALTH CARE EDUCATION/TRAINING PROGRAM | Admitting: STUDENT IN AN ORGANIZED HEALTH CARE EDUCATION/TRAINING PROGRAM
Payer: MEDICARE

## 2024-12-20 VITALS
RESPIRATION RATE: 16 BRPM | TEMPERATURE: 98 F | HEART RATE: 75 BPM | OXYGEN SATURATION: 97 % | BODY MASS INDEX: 33.63 KG/M2 | DIASTOLIC BLOOD PRESSURE: 64 MMHG | WEIGHT: 197 LBS | HEIGHT: 64 IN | SYSTOLIC BLOOD PRESSURE: 133 MMHG

## 2024-12-20 DIAGNOSIS — M54.12 CERVICAL RADICULOPATHY: Primary | ICD-10-CM

## 2024-12-20 DIAGNOSIS — G89.29 CHRONIC PAIN: ICD-10-CM

## 2024-12-20 LAB — POCT GLUCOSE: 135 MG/DL (ref 70–110)

## 2024-12-20 PROCEDURE — 62321 NJX INTERLAMINAR CRV/THRC: CPT | Performed by: STUDENT IN AN ORGANIZED HEALTH CARE EDUCATION/TRAINING PROGRAM

## 2024-12-20 PROCEDURE — 62321 NJX INTERLAMINAR CRV/THRC: CPT | Mod: ,,, | Performed by: STUDENT IN AN ORGANIZED HEALTH CARE EDUCATION/TRAINING PROGRAM

## 2024-12-20 PROCEDURE — 63600175 PHARM REV CODE 636 W HCPCS: Performed by: STUDENT IN AN ORGANIZED HEALTH CARE EDUCATION/TRAINING PROGRAM

## 2024-12-20 PROCEDURE — 25500020 PHARM REV CODE 255: Performed by: STUDENT IN AN ORGANIZED HEALTH CARE EDUCATION/TRAINING PROGRAM

## 2024-12-20 PROCEDURE — 82962 GLUCOSE BLOOD TEST: CPT | Performed by: STUDENT IN AN ORGANIZED HEALTH CARE EDUCATION/TRAINING PROGRAM

## 2024-12-20 RX ORDER — MIDAZOLAM HYDROCHLORIDE 1 MG/ML
INJECTION INTRAMUSCULAR; INTRAVENOUS
Status: DISCONTINUED | OUTPATIENT
Start: 2024-12-20 | End: 2024-12-20 | Stop reason: HOSPADM

## 2024-12-20 RX ORDER — SODIUM CHLORIDE 9 MG/ML
500 INJECTION, SOLUTION INTRAVENOUS CONTINUOUS
Status: DISCONTINUED | OUTPATIENT
Start: 2024-12-20 | End: 2024-12-20 | Stop reason: HOSPADM

## 2024-12-20 RX ORDER — DEXAMETHASONE SODIUM PHOSPHATE 10 MG/ML
INJECTION INTRAMUSCULAR; INTRAVENOUS
Status: DISCONTINUED | OUTPATIENT
Start: 2024-12-20 | End: 2024-12-20 | Stop reason: HOSPADM

## 2024-12-20 RX ORDER — LIDOCAINE HYDROCHLORIDE 10 MG/ML
1 INJECTION, SOLUTION EPIDURAL; INFILTRATION; INTRACAUDAL; PERINEURAL ONCE
Status: DISCONTINUED | OUTPATIENT
Start: 2024-12-20 | End: 2024-12-20 | Stop reason: HOSPADM

## 2024-12-20 RX ORDER — FENTANYL CITRATE 50 UG/ML
INJECTION, SOLUTION INTRAMUSCULAR; INTRAVENOUS
Status: DISCONTINUED | OUTPATIENT
Start: 2024-12-20 | End: 2024-12-20 | Stop reason: HOSPADM

## 2024-12-20 RX ORDER — LIDOCAINE HYDROCHLORIDE 20 MG/ML
INJECTION, SOLUTION EPIDURAL; INFILTRATION; INTRACAUDAL; PERINEURAL
Status: DISCONTINUED | OUTPATIENT
Start: 2024-12-20 | End: 2024-12-20 | Stop reason: HOSPADM

## 2024-12-20 NOTE — PLAN OF CARE
Aisha Muñoz has met all discharge criteria from Phase II. Vital Signs are stable, ambulating  without difficulty. Discharge instructions given, patient verbalized understanding. Discharged from facility via wheelchair in stable condition.

## 2024-12-20 NOTE — DISCHARGE INSTRUCTIONS
[facility]  Dr. Eduar Neri  Messaging service # [phone]    POST-PROCEDURE INSTRUCTIONS:  HELPFUL TIPS:    Except for block procedures (medial branch blocks, genicular blocks, hip/shoulder blocks), most procedures take about 2 weeks to start seeing the full effect toward your pain.  If you feel like the pain relief goes away after a day, please wait up to 2 weeks to decide if the procedure provided you any relief    If you had a block procedure (medial branch blocks, genicular blocks, hip/shoulder blocks), you MUST submit your pain diary and percentage relief scores to proceed with the next block and/or procedure.  Please submit the diary/percentages through the [facility] OR you can call [phone] ([place]) OR [phone] ([place]) during clinic hours (Monday - Friday, 8AM - 5PM)    Follow up in 2 weeks with either the provider that suggested this procedure OR with Dr. Neri (including his team members at [facility]).  You may already have a follow up appointment scheduled.  If not, you may make an appointment through the [facility] or you can contact the office that suggested your procedure.  If you would like to make an appointment with Dr. Neri, you may do so through the [facility] OR you can call [phone] ([place]) OR [phone] ([place]) during clinic hours (Monday - Friday, 8AM - 5PM).      What you need to do:    Keep a record of your response to the injection you had today.    How much relief did you get?   When did the relief start and how long did it last?  Were you able to decrease the use of any of your pain medications?  Were you able to increase your level of activity?  How long did the relief last?    What to watch out for:    If you experience any of the following symptoms after your procedure, please notify the messaging service immediately (see above for contact information):   fever (increased oral temperature)   bleeding or  swelling at the injection site,    drainage, rash or redness at the injection site    possible signs of infection    increased pain at the injection site   worsening of your usual pain   severe headache   new or worsening numbness    new arm and/or leg weakness, or    changes in bowel and/or bladder function: urinating or defecating on yourself and not knowing that you did it.    PLEASE FOLLOW ALL INSTRUCTIONS CAREFULLY     Do not engage in strenuous activity (e.g., lifting or pushing heavy objects or repeated bending) for 24 hours.     Do not take a bath, swim or use Jacuzzi for 24 hours after procedure. (A shower is fine).   Remove any Band-Aids when you get home.    Use cold/ice, as needed for comfort.  We recommend the use of cold therapy alternating on for 20 minutes, off for 20 minutes.    Do not apply direct heat (heating pad or heat packs) to the injection site for 24 hours.     Resume your usual medications, unless instructed otherwise by your Pain Physician.     If you are on warfarin (Coumadin) or other blood thinner, resume this medication as instructed by your prescribing Physician.    IF AT ANY POINT YOU ARE VERY CONCERNED ABOUT YOUR SYMPTOMS, PLEASE GO TO THE EMERGENCY ROOM.    If you develop worsening pain, weakness, numbness, lose bowel or bladder control (i.e., having an accident where you did not even know you had to go to the bathroom and suddenly noticed you soiled yourself), saddle anesthesia (a loss of sensation restricted to the area of the buttocks, anus and between the legs -- i.e., those parts of your body that would touch a saddle if you were sitting on one) you need to go immediately to the emergency department for evaluation and treatment.    ----------------------------------------------------------------------------------------------------------------------------------------------------------------  If you received Sedation please read the following instructions:  POST SEDATION  INSTRUCTIONS    Today you received intravenous medication (also known as sedation) that was used to help you relax and/or decrease discomfort during your procedure. This medication will be acting in your body for the next 24 hours, so you might feel a little tired or sleepy. This feeling will slowly wear off.   Common side effects associated with these medications include: drowsiness, dizziness, sleepiness, confusion, feeling excited, difficulty remembering things, lack of steadiness with walking or balance, loss of fine muscle control, slowed reflexes, difficulty focusing, and blurred vision.  Some over-the-counter and prescription medications (e.g., muscle relaxants, opioids, mood-altering medications, sedatives/hypnotics, antihistamines) can interact with the intravenous medication you received and cause an increased risk of the side effects listed above in addition to other potentially life threatening side effects. Use extreme caution if you are taking such medications, and consult with your Pain Physician or prescribing physician if you have any questions.  For the next 12-24 hours:    DO NOT--Drive a car, operate machinery or power tools   DO NOT--Drink any alcoholic beverages (not even beer), they may dangerously increase the risk of side effects.    DO NOT--Make any important legal or business decisions or sign important documents.  We advise you to have someone to assist you at home. Move slowly and carefully. Do not make sudden changes in position. Be aware of dizziness or light-headedness and move accordingly.   If you seek medical treatment within 24 hours, let the nurse or doctor caring for you know that you have received the above medications. If you have any questions or concerns related to your sedation or treatment today please contact us.

## 2024-12-20 NOTE — DISCHARGE SUMMARY
Discharge Note  Short Stay      SUMMARY     Admit Date: 12/20/2024    Attending Physician: Eduar Neri MD PhD    Discharge Physician: Eduar Neri      Discharge Date: 12/20/2024 9:58 AM    Procedure(s) (LRB):  DAVID C7-T1 (N/A)    Final Diagnosis: Cervical radiculopathy [M54.12]    Disposition: Home or self care    Patient Instructions:   Current Discharge Medication List        CONTINUE these medications which have NOT CHANGED    Details   fluconazole (DIFLUCAN) 150 MG Tab TAKE 1 TABLET BY MOUTH ONCE. CAN REPEAT IN 4 DAYS  Qty: 2 tablet, Refills: 5      losartan (COZAAR) 25 MG tablet Take 1 tablet (25 mg total) by mouth once daily.  Qty: 90 tablet, Refills: 3    Comments: .  Associated Diagnoses: Primary hypertension      pantoprazole (PROTONIX) 40 MG tablet Take 1 tablet (40 mg total) by mouth once daily.  Qty: 90 tablet, Refills: 3      pioglitazone (ACTOS) 45 MG tablet Take 1 tablet (45 mg total) by mouth once daily.  Qty: 90 tablet, Refills: 3    Associated Diagnoses: Poorly controlled type 2 diabetes mellitus      tirzepatide 10 mg/0.5 mL PnIj Inject 10 mg into the skin every 7 days.  Qty: 0.5 mL, Refills: 3    Associated Diagnoses: Poorly controlled type 2 diabetes mellitus      acetaminophen (TYLENOL) 325 MG tablet Take 325 mg by mouth as needed for Pain.      albuterol (VENTOLIN HFA) 90 mcg/actuation inhaler Inhale 2 puffs into the lungs every 4 (four) hours as needed for Wheezing. Rescue  Qty: 18 g, Refills: 1    Associated Diagnoses: Acute viral syndrome      diclofenac sodium (VOLTAREN) 1 % Gel 4 grams to effected area 4 times daily do not exceed 32 grams  Qty: 1 each, Refills: 0    Associated Diagnoses: Prepatellar bursitis of right knee; Injury of right Achilles tendon, initial encounter      EPINEPHrine (EPIPEN) 0.3 mg/0.3 mL AtIn Inject into the muscle.      ezetimibe (ZETIA) 10 mg tablet Take 1 tablet (10 mg total) by mouth once daily.  Qty: 90 tablet, Refills: 3    Associated Diagnoses:  Hyperlipidemia associated with type 2 diabetes mellitus      fluticasone propionate (FLONASE) 50 mcg/actuation nasal spray 1 spray (50 mcg total) by Each Nostril route 2 (two) times daily as needed for Rhinitis.  Qty: 16 g, Refills: 3      gabapentin (NEURONTIN) 100 MG capsule Take 1-3 capsules (100-300 mg total) by mouth every evening.  Qty: 90 capsule, Refills: 1    Associated Diagnoses: Cervical radiculopathy; DDD (degenerative disc disease), cervical      LIDOcaine (LIDODERM) 5 % SMARTSI-2 Patch(s) Topical Daily PRN      meloxicam (MOBIC) 15 MG tablet Take 1 tablet (15 mg total) by mouth once daily.  Qty: 14 tablet, Refills: 0    Associated Diagnoses: Cervical radiculopathy; DDD (degenerative disc disease), cervical      ondansetron (ZOFRAN-ODT) 4 MG TbDL Take 1 tablet (4 mg total) by mouth every 8 (eight) hours as needed (nausea).  Qty: 90 tablet, Refills: 3    Associated Diagnoses: Nausea      oxyCODONE-acetaminophen (PERCOCET)  mg per tablet Take 1 tablet by mouth 3 (three) times daily as needed.  Qty: 90 tablet, Refills: 0    Comments: Quantity prescribed more than 7 day supply? Press F2 and select one:[id number]        pitavastatin calcium (LIVALO) 4 mg Tab Take 4 mg by mouth once daily.  Qty: 90 tablet, Refills: 3    Associated Diagnoses: Hyperlipidemia associated with type 2 diabetes mellitus      tiZANidine (ZANAFLEX) 4 MG tablet Take 1 tablet (4 mg total) by mouth nightly as needed (muscle tension).  Qty: 60 tablet, Refills: 0    Associated Diagnoses: Cervical radiculopathy; DDD (degenerative disc disease), cervical                 Discharge Diagnosis: Cervical radiculopathy [M54.12]  Condition on Discharge: Stable with no complications to procedure   Diet on Discharge: Same as before.  Activity: as per instruction sheet.  Discharge to: Home with a responsible adult.  Follow up: 2-4 weeks       Please call my office or pager at [phone] if experienced any weakness or loss of sensation, fever >  101.5, pain uncontrolled with oral medications, persistent nausea/vomiting/or diarrhea, redness or drainage from the incisions, or any other worrisome concerns. If physician on call was not reached or could not communicate with our office for any reason please go to the nearest emergency department      Eduar Neri MD PhD

## 2024-12-20 NOTE — OP NOTE
Cervical Interlaminar Epidural Steroid Injection under Fluoroscopic Guidance    The procedure, risks, benefits, and options were discussed with the patient. There are no contraindications to the procedure. The patent expressed understanding and agreed to the procedure. Informed written consent was obtained prior to the start of the procedure and can be found in the patient's chart.     PATIENT NAME: Aisha Muñoz   MRN: [id number]     DATE OF PROCEDURE: 12/20/2024    PROCEDURE: Cervical Interlaminar Epidural Steroid Injection C7/T1 under Fluoroscopic Guidance    PRE-OP DIAGNOSIS: Cervical radiculopathy [M54.12] Cervical radiculopathy [M54.12]    POST-OP DIAGNOSIS: Same    PHYSICIAN: Eduar Neri MD     ASSISTANTS: None    MEDICATIONS INJECTED: Preservative-free Decadron 10mg with 1cc of Lidocaine 1% MPF and preservative free normal saline    LOCAL ANESTHETIC INJECTED: Xylocaine 2%     SEDATION: Versed 1mg and Fentanyl 50mcg                                                                                                                                                                                     Conscious sedation ordered by M.D. Patient re-evaluation prior to administration of conscious sedation. No changes noted in patient's status from initial evaluation. The patient's vital signs were monitored by RN and patient remained hemodynamically stable throughout the procedure.    Event Time In   Sedation Start 0952   Sedation End 0957       ESTIMATED BLOOD LOSS: None    COMPLICATIONS: None    TECHNIQUE: Time-out was performed to identify the patient and procedure to be performed. With the patient laying in a prone position, the surgical area was prepped and draped in the usual sterile fashion using ChloraPrep and a fenestrated drape. The level was determined under fluoroscopy guidance. Skin anesthesia was achieved by injecting Lidocaine 2% over the injection site.  The interlaminar space was then approached with a  20 gauge, 3.5 inch Tuohy needle that was introduced under fluoroscopic guidance with AP, lateral and/or contralateral oblique imaging. Once the Ligamentum flavum was encountered loss of resistance to saline was used to enter the epidural space. With positive loss of resistance and negative aspiration for CSF or Blood, contrast dye  Omnipaque (300mg/mL) was injected to confirm placement and there was no vascular runoff. Then 3 mL of the medication mixture listed above was then injected slowly. Displacement of the radio opaque contrast after injection of the medication confirmed that the medication went into the area of the epidural space. The needles were removed, and bleeding was nil. A sterile dressing was applied. No specimens collected. The patient tolerated the procedure well.     The patient was monitored after the procedure in the recovery area. They were given post-procedure and discharge instructions to follow at home. The patient was discharged in a stable condition.    Eduar Neri MD

## 2024-12-20 NOTE — INTERVAL H&P NOTE
The patient was examined and no significant changes were noted from the updated H&P or last clinic note.    The risks and benefits of this procedure, including alternative therapies, were discussed with the patient.  The discussion of risks included infection, bleeding, need for additional procedures or surgery, nerve damage, paralysis, adverse medication reaction(s), stroke, and if appropriate for the procedure, death.  Questions regarding the procedure, risks, expected outcome, and possible side effects were solicited and answered to Aisha's satisfaction.  Aisha Wanda wishes to proceed with the injection or procedure as confirmed by written consent.

## 2025-01-01 ENCOUNTER — HOSPITAL ENCOUNTER (EMERGENCY)
Facility: HOSPITAL | Age: 60
Discharge: HOME | End: 2025-01-01
Attending: EMERGENCY MEDICINE
Payer: MEDICARE

## 2025-01-01 VITALS
TEMPERATURE: 98 F | WEIGHT: 201 LBS | BODY MASS INDEX: 34.31 KG/M2 | SYSTOLIC BLOOD PRESSURE: 167 MMHG | RESPIRATION RATE: 16 BRPM | HEIGHT: 64 IN | OXYGEN SATURATION: 98 % | DIASTOLIC BLOOD PRESSURE: 91 MMHG | HEART RATE: 78 BPM

## 2025-01-01 DIAGNOSIS — S86.001A INJURY OF RIGHT ACHILLES TENDON, INITIAL ENCOUNTER: ICD-10-CM

## 2025-01-01 DIAGNOSIS — M25.572 ACUTE LEFT ANKLE PAIN: ICD-10-CM

## 2025-01-01 DIAGNOSIS — M25.571 CHRONIC PAIN OF RIGHT ANKLE: Primary | ICD-10-CM

## 2025-01-01 DIAGNOSIS — G89.29 CHRONIC PAIN OF RIGHT ANKLE: Primary | ICD-10-CM

## 2025-01-01 DIAGNOSIS — M70.41 PREPATELLAR BURSITIS OF RIGHT KNEE: ICD-10-CM

## 2025-01-01 PROCEDURE — 63600175 PHARM REV CODE 636 W HCPCS: Performed by: PHYSICIAN ASSISTANT

## 2025-01-01 PROCEDURE — 96372 THER/PROPH/DIAG INJ SC/IM: CPT | Performed by: PHYSICIAN ASSISTANT

## 2025-01-01 PROCEDURE — 99284 EMERGENCY DEPT VISIT MOD MDM: CPT | Mod: 25

## 2025-01-01 RX ORDER — DICLOFENAC SODIUM 10 MG/G
GEL TOPICAL
Qty: 1 EACH | Refills: 0 | Status: SHIPPED | OUTPATIENT
Start: 2025-01-01

## 2025-01-01 RX ORDER — KETOROLAC TROMETHAMINE 30 MG/ML
10 INJECTION, SOLUTION INTRAMUSCULAR; INTRAVENOUS
Status: COMPLETED | OUTPATIENT
Start: 2025-01-01 | End: 2025-01-01

## 2025-01-01 RX ADMIN — KETOROLAC TROMETHAMINE 10 MG: 30 INJECTION, SOLUTION INTRAMUSCULAR; INTRAVENOUS at 10:01

## 2025-01-01 NOTE — ED NOTES
Patient identifiers for Aisha Muñoz 59 y.o. female checked and correct.  Chief Complaint   Patient presents with    Ankle Pain     Both ankles hurting for long time     Past Medical History:   Diagnosis Date    Acute midline low back pain with right-sided sciatica 06/07/2023    Acute viral syndrome 12/07/2023    Arthritis     Brain tumor (benign) 1997    Chronic back pain     Diabetes mellitus     Diabetic gastroparesis associated with type 2 diabetes mellitus 08/25/2023    Gastroesophageal reflux disease without esophagitis 08/25/2023    Genetic testing 06/2024    MSH6 variant of uncertain significance, see Ambry results in Media.    Hypertension     Pain 07/19/2024     Allergies reported:   Review of patient's allergies indicates:   Allergen Reactions    Fish containing products Shortness Of Breath, Swelling and Rash    Lisinopril Other (See Comments)     Pt has dry cough continuously    Grape Itching    Strawberries [strawberry] Itching    Banana Itching    Lyrica [pregabalin] Other (See Comments)     headache    Pineapple Hives and Itching     Most recent vital signs:  Vitals:    01/01/25 0943   BP: (!) 167/91   Pulse: 78   Resp: 16   Temp: 98.2 °F (36.8 °C)      LOC: The patient is awake, alert and aware of environment with an appropriate affect, the patient is oriented x 3 and speaking appropriately.   APPEARANCE: Patient appears comfortable and in no acute distress, patient is clean and well groomed.  SKIN: The skin is warm and dry, color consistent with ethnicity, patient has normal skin turgor and moist mucus membranes, skin intact, no breakdown or bruising noted.   MUSCULOSKELETAL: Patient moving all extremities spontaneously, no swelling noted.  RESPIRATORY: Airway is open and patent, respirations are spontaneous, patient has a normal effort and rate, no accessory muscle use noted.  CARDIAC: Patient has a normal rate and regular rhythm, no edema noted, capillary refill < 3 seconds.   GASTRO: Soft and  non tender to palpation, no distention noted, normoactive bowel sounds present in all four quadrants. Pt states bowel movements have been regular.  : Pt denies any pain or frequency with urination.  NEURO: Pt opens eyes spontaneously, behavior appropriate to situation, follows commands, facial expression symmetrical, bilateral hand grasp equal and even, purposeful motor response noted, normal sensation in all extremities when touched with a finger.

## 2025-01-01 NOTE — Clinical Note
"Aisha"Brady Muñoz was seen and treated in our emergency department on 1/1/2025.  She may return to work on 01/02/2025.       If you have any questions or concerns, please don't hesitate to call.      Brigitte Broussard PA-C"

## 2025-01-01 NOTE — DISCHARGE INSTRUCTIONS
You do not have signs of fracture, dislocation, joint infection, or cellulitis of your bilateral feet or ankles.  You likely have acute pain from degenerative joint disease.  Your left ankle is promptly hurting from favoring the left leg since your right ankle has been bothering you lately.  Take Mobic once a day for anti-inflammatory relief.  When you run out, you can Take ibuprofen 600-800 mg every 6 hours as needed with food for anti-inflammatory relief.  You can take acetaminophen/tylenol 650 mg every 6 hours or 1000 mg every 8 hours for added relief.  Apply ice to the area for 10-20 minutes every 4 hours. You can apply heat 2 days after for the same duration and frequency.  Keep joints wrapped in ace wrap for comfort.   Elevate joint if you see swelling.  Follow up with Orthopedics for re-evaluation.  Return to the ER for new or worsening symptoms.  Future Appointments   Date Time Provider Department Center   1/10/2025  9:30 AM Tatianna Paulino MD [facility] [place]   1/21/2025 10:30 AM Malachi Zacarias DPM [facility] [place] [facility]   2/3/2025  7:30 AM Claire Palencia PA-C [facility] [place] [facility]   2/7/2025 11:20 AM Marixa Traore, ANDREWP-C [facility] [place] [facility]

## 2025-01-01 NOTE — ED PROVIDER NOTES
Encounter Date: 1/1/2025       History     Chief Complaint   Patient presents with    Ankle Pain     Both ankles hurting for long time     10:40 AM  Patient is a 59-year-old female with a history of DM, diabetic gastroparesis, GERD, HTN, arthritis, back pain who presents to [facility] ED via POV for emergent evaluation of bilateral ankle pain.    Patient reports chronic right ankle pain mainly to her posterior ankle and lateral right foot.  She feels like her foot is swollen again.  2 days ago she noted acute pain to her left ankle.  She denies any injury, trauma, heavy lifting.  Tried Tylenol, last dose 2 days ago.  Has Mobic from previous back pain at home but is not taking it.    Past R foot and ankle imaging obtained for chronic pain has shown calcaneus spurs, achilles enthesophyte, and degenerative change at the 1st MTP joint.        Review of patient's allergies indicates:   Allergen Reactions    Fish containing products Shortness Of Breath, Swelling and Rash    Lisinopril Other (See Comments)     Pt has dry cough continuously    Grape Itching    Strawberries [strawberry] Itching    Banana Itching    Lyrica [pregabalin] Other (See Comments)     headache    Pineapple Hives and Itching     Past Medical History:   Diagnosis Date    Acute midline low back pain with right-sided sciatica 06/07/2023    Acute viral syndrome 12/07/2023    Arthritis     Brain tumor (benign) 1997    Chronic back pain     Diabetes mellitus     Diabetic gastroparesis associated with type 2 diabetes mellitus 08/25/2023    Gastroesophageal reflux disease without esophagitis 08/25/2023    Genetic testing 06/2024    MSH6 variant of uncertain significance, see Ambry results in Media.    Hypertension     Pain 07/19/2024     Past Surgical History:   Procedure Laterality Date    BACK SURGERY      no hardware  fusion    BRAIN SURGERY  1997    benign tumor removed from brain    CARPAL TUNNEL RELEASE Bilateral     COLONOSCOPY N/A 05/24/2024    Procedure:  COLONOSCOPY;  Surgeon: Alden Luna MD;  Location: [facility] ([address] [place] [address]);  Service: Endoscopy;  Laterality: N/A;  Ref By Dr. Hudson  Procedure: Colonoscopy  Diagnosis: Screening colonoscopy  procedure timein-12 weeks  Prep: Suflav   Diabetes  24-Precall complete, instr portal, confirmed new arrrival time of 745am and pt holding Mounjaro-DS  24- per message from Eula Lee    EPIDURAL STEROID INJECTION INTO CERVICAL SPINE N/A 2024    Procedure: DAVID C7-T1;  Surgeon: Eduar Neri MD;  Location: [facility];  Service: Pain Management;  Laterality: N/A;  no ac-not taking Tirzepatide any longer    HYSTERECTOMY      ovaries in situ     Family History   Problem Relation Name Age of Onset    Cirrhosis Mother Constantine     Leukemia Mother Constantine     Cirrhosis Maternal Grandmother      Cirrhosis Other Lyla 30 - 39    Pancreatic cancer Other Lyla 60 - 69    Lupus Daughter      Breast cancer Other      Colon cancer Neg Hx      Ovarian cancer Neg Hx       Social History     Tobacco Use    Smoking status: Former     Current packs/day: 0.00     Types: Cigarettes     Quit date: 2013     Years since quittin.1    Smokeless tobacco: Never   Substance Use Topics    Alcohol use: Yes     Comment: occasionally    Drug use: No     Review of Systems   Constitutional:  Positive for activity change. Negative for appetite change and fever.   HENT:  Negative for sore throat.    Respiratory:  Negative for shortness of breath.    Cardiovascular:  Negative for chest pain.   Gastrointestinal:  Negative for nausea.   Genitourinary:  Negative for dysuria.   Musculoskeletal:  Positive for arthralgias and joint swelling. Negative for back pain.   Skin:  Negative for rash.   Neurological:  Negative for weakness.   Hematological:  Does not bruise/bleed easily.       Physical Exam     Initial Vitals [25 0943]   BP Pulse Resp Temp SpO2   (!) 167/91 78 16 98.2 °F (36.8 °C) 98 %      MAP       --          Physical Exam    Vitals reviewed.  Constitutional: She appears well-developed and well-nourished. She is not diaphoretic. She is cooperative.  Non-toxic appearance. She does not have a sickly appearance. She does not appear ill. No distress.   HENT:   Head: Normocephalic and atraumatic.   Nose: Nose normal. Mouth/Throat: No trismus in the jaw.   Eyes: Conjunctivae and EOM are normal.   Neck:   Normal range of motion.  Pulmonary/Chest: No accessory muscle usage. No tachypnea. No respiratory distress.   Abdominal: She exhibits no distension.   Musculoskeletal:         General: Normal range of motion.      Cervical back: Normal range of motion.      Right knee: Normal.      Left knee: Normal.      Right ankle: No swelling, deformity, ecchymosis or lacerations. Tenderness present. No lateral malleolus, medial malleolus or base of 5th metatarsal tenderness. Normal range of motion.      Left ankle: Normal.      Comments: She endorses tenderness to palpation of right Achilles tendon and generalized ankle noting that she has normal range of motion of bilateral ankles and foot.  Endorses left ankle pain, but nothing reproducible on exam.  Skin without erythema, edema, warmth, abrasions, rashes, laceration.  She is able to bear weight and take multiple steps an exam room without assistance.     Neurological: She is alert. She has normal strength.   Skin: Skin is warm and dry. No erythema. No pallor.         ED Course   Procedures  Labs Reviewed - No data to display       Imaging Results    None          Medications   ketorolac injection 9.999 mg (9.999 mg Intramuscular Given 1/1/25 5660)     Medical Decision Making  Differential diagnosis includes but isn't limited to degenerative bone disease, arthritis, other inflammatory arthritis such as gouty arthritis, sprain, tendinopathy.  No signs of cellulitis, septic arthritis, neuroma, acute fractures or dislocations, acute ischemic limb.    Patient with a reassuring physical  exam.  She does not need any x-rays at this time as it is of little utility and will not change my management.  Last creatinine within normal limits.  Will give Toradol intramuscular injection.  Resume NSAIDs at home.  States that she has Mobic.  Encourage ibuprofen use after Mobic but only 1 NSAID at a time.  I personally Ace wrapped her left ankle.  Continue compression.  Elevation for edema.  Follow up closely with sports medicine if symptoms do not improve.  All of her questions were answered.  Patient comfortable with plan and stable for discharge.    Amount and/or Complexity of Data Reviewed  External Data Reviewed: labs and radiology.    Risk  Prescription drug management.               ED Course as of 01/01/25 1103   Wed Jan 01, 2025   1016 BP(!): 167/91 [CL]   1016 Temp: 98.2 °F (36.8 °C) [CL]   1016 Resp: 16 [CL]   1016 Pulse: 78 [CL]   1016 SpO2: 98 % [CL]      ED Course User Index  [CL] Brigitte Broussard PA-C                           Clinical Impression:  Final diagnoses:  [M25.571, G89.29] Chronic pain of right ankle (Primary)  [M25.572] Acute left ankle pain          ED Disposition Condition    Discharge Stable          ED Prescriptions       Medication Sig Dispense Start Date End Date Auth. Provider    diclofenac sodium (VOLTAREN) 1 % Gel 4 grams to effected area 4 times daily do not exceed 32 grams 1 each 1/1/2025 -- Brigitte Broussard PA-C          Follow-up Information       Follow up With Specialties Details Why Contact Info    Claire Palencia PA-C Orthopedic Surgery Schedule an appointment as soon as possible for a visit   [address] [facility] [address]  [phone]      [facility] - Emergency Dept Emergency Medicine  If symptoms worsen [address] [facility] [id number] [phone]          Future Appointments   Date Time Provider Department Center   1/10/2025  9:30 AM Tatianna Paulino MD [facility] [place]   1/21/2025 10:30 AM Malachi Zacarias, DPM [facility] [place]  [facility]   2/3/2025  7:30 AM Claire Palencia PA-C [facility] [address] [facility]   2/7/2025 11:20 AM Marixa Traore FNP-C [facility] Brigitte Johnson PA-C  01/01/25 1103

## 2025-01-09 NOTE — PROGRESS NOTES
[facility]ediatric[facility] Progress Note      Chief Complaint     Diabetes and Follow-up  Bilateral ankle pain     Subjective:      History of Present Illness:  Aisha Muñoz is a 59 y.o. female     Bilateral Achilles Tendonitis as per podiatry, Dr. Palencia : S/p right ankle pain and went to ER on 1/01/2025: sees Dr. Danilo Palencia who says pain is due to inflamed spurs calcaneus spurs, achilles enthesophyte, and degenerative change at the 1st MTP joint 6/2024. Got Toradol injection which improved  -takes Tylenol and oxycodone and gabapentin 100mg qhs   -did not take mobic prescribed for her neck   Has appt 2/03 with Dr. Palencia     Left Labial abscess x 5 days: increasingly painful; tried hot water with epsom salt but caused significant burning     Hypertension: stable on losartan 25mg daily but pt does not check her Bps consistently/    DM II with gastroparesis: on mounjaro 10mg SQ weekly; takes zofran, PPI with improve epigastric pain; vomited x 2 days after eating quarter pounder and french fries; has GI appt 2/7   -needs labs     HLD: had stopped livalo and zetia since she thought she didn't need it since her  last LDL 70 off of meds in 8/2024    Chronic left shoulder pain x 9 mo likely with rotator cuff pathology:   S/p Cervical DAVID C7-T1 on 12/20/2024: did not improve her left shoulder pain which is persistent -refused subacromial injection on 10/2024 as per recommended by Ortho   -never took Mobic for pain; only takes Tylenol 325mg and percocet tid     GERD: needs refill of protonix 40mg in am; has GI appt 11/18/2024     HTN: needs replacement of lisinopril    Candida vaginitis: needs Diflucan     FH: pancreatic cancer (mom and maternal 1st cousin once removed): referred go genetics with labs drawn 5/2024 with variant of undetermined significance     Chronic right ankle pain: sees Ortho Claire Palencia-PA  last 7/2024 who recommended RICE, heel lift and PT     Poorly-controlled DM:  HbA1C 8.8 (8.6) needs Actos  45mg daily; needs refill mounjaro 10mg SQ weekly off x 4 wks; eating baked potatoes, adds sugar 5 tsp in his cofffee, sweetened iced tea; drinks a lot of water  -trying to eat healthier foods ie avoiding fried foods    Mixed HLD: did not tolerate statins due to cramps; insurance did NOT pay for Livalo   HLD: LDL 70, HDL 65, TG 50 on no lipid meds    Tobacco: stopped 1ppd x 14 years; needs LDCT now *smoked x 18 years     Left shoulder pain with decreased ROM x 1 year:  /10 taking lidoderm 5% patches; no trauma; does not sleep on left side; would like to be evaluated     Chronic bilateral LBP due to spondylosis with sciatica: gets epidural injections by Dr. Ronal alfred approx every 3 mo i last seen 2024   -has hx L5-S1 anterior instrumented fusion     SH: works at [employer]   SH: no drinking, drugs, alcohol; 9 children; works at [employer] as a    FH: pancreatic cancer -mom, MGM, maternal aunt all  from it 54 y/o, 50s from pancreatic cancer .     Past Medical History:  Past Medical History:   Diagnosis Date    Acute midline low back pain with right-sided sciatica 2023    Acute viral syndrome 2023    Arthritis     Brain tumor (benign)     Chronic back pain     Chronic shoulder bursitis, left 2024    Diabetes mellitus     Diabetic gastroparesis associated with type 2 diabetes mellitus 2023    Gastroesophageal reflux disease without esophagitis 2023    Genetic testing 2024    MSH6 variant of uncertain significance, see [facility] results in [facility].    Hypertension     Pain 2024       Past Surgical History:  Past Surgical History:   Procedure Laterality Date    BACK SURGERY      no hardware  fusion    BRAIN SURGERY      benign tumor removed from brain    CARPAL TUNNEL RELEASE Bilateral     COLONOSCOPY N/A 2024    Procedure: COLONOSCOPY;  Surgeon: Alden Luna MD;  Location: [address];  Service: Endoscopy;  Laterality: N/A;  Ref By   Spera  Procedure: Colonoscopy  Diagnosis: Screening colonoscopy  procedure timein-12 weeks  Prep: Suflav   Diabetes  24-Precall complete, instr portal, confirmed new arrrival time of 745am and pt holding Mounjaro-DS  24- per message from Eula Lee    EPIDURAL STEROID INJECTION INTO CERVICAL SPINE N/A 2024    Procedure: DAVID C7-T1;  Surgeon: Eduar Neri MD;  Location: [facility];  Service: Pain Management;  Laterality: N/A;  no ac-not taking Tirzepatide any longer    HYSTERECTOMY      ovaries in situ       Allergies:  Review of patient's allergies indicates:   Allergen Reactions    Fish containing products Shortness Of Breath, Swelling and Rash    Lisinopril Other (See Comments)     Pt has dry cough continuously    Grape Itching    Strawberries [strawberry] Itching    Banana Itching    Lyrica [pregabalin] Other (See Comments)     headache    Pineapple Hives and Itching       Home Medications:  Current Outpatient Medications   Medication Sig Dispense Refill    acetaminophen (TYLENOL) 325 MG tablet Take 325 mg by mouth as needed for Pain.      diclofenac sodium (VOLTAREN) 1 % Gel 4 grams to effected area 4 times daily do not exceed 32 grams 1 each 0    EPINEPHrine (EPIPEN) 0.3 mg/0.3 mL AtIn Inject into the muscle.      fluticasone propionate (FLONASE) 50 mcg/actuation nasal spray 1 spray (50 mcg total) by Each Nostril route 2 (two) times daily as needed for Rhinitis. 16 g 3    LIDOcaine (LIDODERM) 5 % SMARTSI-2 Patch(s) Topical Daily PRN      losartan (COZAAR) 25 MG tablet Take 1 tablet (25 mg total) by mouth once daily. 90 tablet 3    meloxicam (MOBIC) 15 MG tablet Take 1 tablet (15 mg total) by mouth once daily. 14 tablet 0    ondansetron (ZOFRAN-ODT) 4 MG TbDL Take 1 tablet (4 mg total) by mouth every 8 (eight) hours as needed (nausea). 90 tablet 3    oxyCODONE-acetaminophen (PERCOCET)  mg per tablet Take 1 tablet by mouth 3 (three) times daily as needed. 90 tablet 0     pantoprazole (PROTONIX) 40 MG tablet Take 1 tablet (40 mg total) by mouth once daily. 90 tablet 3    pioglitazone (ACTOS) 45 MG tablet Take 1 tablet (45 mg total) by mouth once daily. 90 tablet 3    tirzepatide 10 mg/0.5 mL PnIj Inject 10 mg into the skin every 7 days. 0.5 mL 3    tiZANidine (ZANAFLEX) 4 MG tablet Take 1 tablet (4 mg total) by mouth nightly as needed (muscle tension). 60 tablet 0    albuterol (VENTOLIN HFA) 90 mcg/actuation inhaler Inhale 2 puffs into the lungs every 4 (four) hours as needed for Wheezing. Rescue 18 g 1    cephALEXin (KEFLEX) 500 MG capsule Take 1 capsule (500 mg total) by mouth 3 (three) times daily. 30 capsule 1    ergocalciferol (ERGOCALCIFEROL) 50,000 unit Cap Take 1 capsule (50,000 Units total) by mouth every 7 days. 12 capsule 3    ezetimibe (ZETIA) 10 mg tablet Take 1 tablet (10 mg total) by mouth once daily. (Patient not taking: Reported on 1/10/2025) 90 tablet 3    fluconazole (DIFLUCAN) 150 MG Tab Take 1 tablet (150 mg total) by mouth once a week. 12 tablet 5    gabapentin (NEURONTIN) 100 MG capsule Take 1-3 capsules (100-300 mg total) by mouth every evening. 90 capsule 1    magnesium oxide (MAG-OX) 400 mg (241.3 mg magnesium) tablet Take 1 tablet (400 mg total) by mouth 2 (two) times daily. 60 tablet 5    pitavastatin calcium (LIVALO) 4 mg Tab Take 4 mg by mouth once daily. (Patient not taking: Reported on 1/10/2025) 90 tablet 3    tirzepatide (MOUNJARO) 12.5 mg/0.5 mL PnIj Inject 12.5 mg into the skin every 7 days. 0.5 mL 3     No current facility-administered medications for this visit.        Family History:  Family History   Problem Relation Name Age of Onset    Cirrhosis Mother Constantine     Leukemia Mother Constantine     Cirrhosis Maternal Grandmother      Cirrhosis Other Lyla 30 - 39    Pancreatic cancer Other Lyla 60 - 69    Lupus Daughter      Breast cancer Other      Colon cancer Neg Hx      Ovarian cancer Neg Hx         Social History:  Social History     Tobacco  "Use    Smoking status: Former     Current packs/day: 0.00     Average packs/day: 0.5 packs/day for 1 year (0.5 ttl pk-yrs)     Types: Cigarettes     Start date: 2012     Quit date: 2013     Years since quittin.1    Smokeless tobacco: Never   Substance Use Topics    Alcohol use: Yes     Comment: occasionally    Drug use: No       Review of Systems:  Pertinent positives and negatives listed in HPI. All other systems are reviewed and are negative.    Health Maintaince :   Health Maintenance Topics with due status: Not Due       Topic Last Completion Date    TETANUS VACCINE 2019    Colorectal Cancer Screening 2024    Diabetes Urine Screening 2024    Diabetic Eye Exam 2024    Mammogram 10/18/2024    Lipid Panel 01/10/2025    Hemoglobin A1c 01/10/2025    RSV Vaccine (Age 60+ and Pregnant patients) Not Due           [facility] Friday [facility], Friday on [address] [facility] [place]   Needs foot referral   Dental: all pulled out in 2023     IHepatitis C:  Cancer Screening:  refuses flu and COVID   PAP: UTD  Mammogram: UTD 10/2024   Colonoscopy: UTD2024  DEXA:   LDCT: 10/2024 WNL    The 10-year ASCVD risk score (Sara BROWN, et al., 2019) is: 12%    Values used to calculate the score:      Age: 59 years      Sex: Female      Is Non- : Yes      Diabetic: Yes      Tobacco smoker: No      Systolic Blood Pressure: 134 mmHg      Is BP treated: Yes      HDL Cholesterol: 57 mg/dL      Total Cholesterol: 140 mg/dL      Objective:   /76 (BP Location: Left arm, Patient Position: Sitting)   Pulse 77   Resp 14   Ht 5' 4" (1.626 m)   Wt 89.8 kg (197 lb 15.6 oz)   SpO2 98%   BMI 33.98 kg/m²      Body mass index is 33.98 kg/m².       Physical Examination:  General: Alert and awake in no apparent distress  Neck:   Cervical nodes not enlarged (No submental, submandibular, preauricular, posterior auricular or occipital adenopathy); supple; no bruits  Cardio:  Regular " rate and rhythm with normal S1 and S2; no murmurs or rubs  Resp:  CTAB; respirations unlabored; no wheezes, crackles or rhonchi  Shoulders:      left shoulder with limited external rotation and tender left anterior shoulder   Neuro:  AAOx3; cooperative and pleasant with no focal deficits    Laboratory:      Most Recent Data:  Lab Results   Component Value Date    WBC 5.80 01/10/2025    HGB 13.1 01/10/2025    HCT 40.3 01/10/2025     01/10/2025    CHOL 140 01/10/2025    TRIG 50 01/10/2025    HDL 57 01/10/2025    ALT 14 01/10/2025    AST 13 01/10/2025     01/10/2025    K 4.1 01/10/2025     01/10/2025    BUN 12 01/10/2025    CO2 23 01/10/2025    TSH 2.7 08/24/2006    HGBA1C 7.7 (H) 01/10/2025              CBC:   WBC   Date Value Ref Range Status   01/10/2025 5.80 3.90 - 12.70 K/uL Final     Hemoglobin   Date Value Ref Range Status   01/10/2025 13.1 12.0 - 16.0 g/dL Final     Hematocrit   Date Value Ref Range Status   01/10/2025 40.3 37.0 - 48.5 % Final     Platelets   Date Value Ref Range Status   01/10/2025 299 150 - 450 K/uL Final     MCV   Date Value Ref Range Status   01/10/2025 95 82 - 98 fL Final     RDW   Date Value Ref Range Status   01/10/2025 13.6 11.5 - 14.5 % Final     BMP:   Sodium   Date Value Ref Range Status   01/10/2025 140 136 - 145 mmol/L Final     Potassium   Date Value Ref Range Status   01/10/2025 4.1 3.5 - 5.1 mmol/L Final     Chloride   Date Value Ref Range Status   01/10/2025 109 95 - 110 mmol/L Final     CO2   Date Value Ref Range Status   01/10/2025 23 23 - 29 mmol/L Final     BUN   Date Value Ref Range Status   01/10/2025 12 6 - 20 mg/dL Final     Creatinine   Date Value Ref Range Status   01/10/2025 0.8 0.5 - 1.4 mg/dL Final     Glucose   Date Value Ref Range Status   01/10/2025 161 (H) 70 - 110 mg/dL Final     Calcium   Date Value Ref Range Status   01/10/2025 9.3 8.7 - 10.5 mg/dL Final     Magnesium   Date Value Ref Range Status   01/10/2025 1.4 (L) 1.6 - 2.6 mg/dL Final  "    LFTs:   Total Protein   Date Value Ref Range Status   01/10/2025 6.9 6.0 - 8.4 g/dL Final     Albumin   Date Value Ref Range Status   01/10/2025 3.5 3.5 - 5.2 g/dL Final     Total Bilirubin   Date Value Ref Range Status   01/10/2025 0.8 0.1 - 1.0 mg/dL Final     Comment:     For infants and newborns, interpretation of results should be based  on gestational age, weight and in agreement with clinical  observations.    Premature Infant recommended reference ranges:  Up to 24 hours.............<8.0 mg/dL  Up to 48 hours............<12.0 mg/dL  3-5 days..................<15.0 mg/dL  6-29 days.................<15.0 mg/dL       AST   Date Value Ref Range Status   01/10/2025 13 10 - 40 U/L Final     Alkaline Phosphatase   Date Value Ref Range Status   01/10/2025 70 40 - 150 U/L Final     ALT   Date Value Ref Range Status   01/10/2025 14 10 - 44 U/L Final     Coags: No results found for: "INR", "PROTIME", "PTT"  FLP:      Lab Results   Component Value Date    CHOL 140 01/10/2025    CHOL 145 08/16/2024    CHOL 159 08/25/2023     Lab Results   Component Value Date    HDL 57 01/10/2025    HDL 65 08/16/2024    HDL 75 08/25/2023     Lab Results   Component Value Date    LDLCALC 73.0 01/10/2025    LDLCALC 70.0 08/16/2024    LDLCALC 75.0 08/25/2023     Lab Results   Component Value Date    TRIG 50 01/10/2025    TRIG 50 08/16/2024    TRIG 45 08/25/2023     Lab Results   Component Value Date    CHOLHDL 40.7 01/10/2025    CHOLHDL 44.8 08/16/2024    CHOLHDL 47.2 08/25/2023      DM:      Hemoglobin A1C   Date Value Ref Range Status   01/10/2025 7.7 (H) 4.0 - 5.6 % Final     Comment:     ADA Screening Guidelines:  5.7-6.4%  Consistent with prediabetes  >or=6.5%  Consistent with diabetes    High levels of fetal hemoglobin interfere with the HbA1C  assay. Heterozygous hemoglobin variants (HbS, HgC, etc)do  not significantly interfere with this assay.   However, presence of multiple variants may affect accuracy.     08/16/2024 8.8 (H) " "4.0 - 5.6 % Final     Comment:     ADA Screening Guidelines:  5.7-6.4%  Consistent with prediabetes  >or=6.5%  Consistent with diabetes    High levels of fetal hemoglobin interfere with the HbA1C  assay. Heterozygous hemoglobin variants (HbS, HgC, etc)do  not significantly interfere with this assay.   However, presence of multiple variants may affect accuracy.     04/01/2024 8.6 (H) 4.0 - 5.6 % Final     Comment:     ADA Screening Guidelines:  5.7-6.4%  Consistent with prediabetes  >or=6.5%  Consistent with diabetes    High levels of fetal hemoglobin interfere with the HbA1C  assay. Heterozygous hemoglobin variants (HbS, HgC, etc)do  not significantly interfere with this assay.   However, presence of multiple variants may affect accuracy.       LDL Cholesterol   Date Value Ref Range Status   01/10/2025 73.0 63.0 - 159.0 mg/dL Final     Comment:     The National Cholesterol Education Program (NCEP) has set the  following guidelines (reference values) for LDL Cholesterol:  Optimal.......................<130 mg/dL  Borderline High...............130-159 mg/dL  High..........................160-189 mg/dL  Very High.....................>190 mg/dL       Creatinine   Date Value Ref Range Status   01/10/2025 0.8 0.5 - 1.4 mg/dL Final     Thyroid:   TSH   Date Value Ref Range Status   08/24/2006 2.7 0.4 - 4.0 uIU/ml Final     Anemia: No results found for: "IRON", "TIBC", "FERRITIN", "[id number]", "FOLATE"  Cardiac:   Troponin I   Date Value Ref Range Status   05/26/2017 <0.006 0.000 - 0.026 ng/mL Final     Comment:     The reference interval for Troponin I represents the 99th percentile   cutoff   for our facility and is consistent with 3rd generation assay   performance.       BNP   Date Value Ref Range Status   05/26/2017 <10 0 - 99 pg/mL Final     Comment:     Values of less than 100 pg/ml are consistent with non-CHF populations.     Urinalysis:   Urine Culture, Routine   Date Value Ref Range Status   04/23/2007   Final    " >100,000 ORGANISMS/ML -ESCHERICHIA COLI  Amikacin             <=16       SENSITIVE     Amox/K Clav          <=8/4      SENSITIVE     Ceftriaxone          <=8        SENSITIVE     Cefazolin            <=8        SENSITIVE     Ciprofloxacin        <=1        SENSITIVE     Nitrofurantoin       <=32       SENSITIVE     Gentamicin           <=4        SENSITIVE     Bactrim              <=2/38     SENSITIVE     Tetracycline         <=4        SENSITIVE     Timentin             <=16       SENSITIVE     Tobramycin           <=4        SENSITIVE          Color, UA   Date Value Ref Range Status   08/16/2024 Yellow Yellow, Straw, Nelly Final     Specific Gravity, UA   Date Value Ref Range Status   08/16/2024 1.020 1.005 - 1.030 Final     Nitrite, UA   Date Value Ref Range Status   08/16/2024 Positive (A) Negative Final     Ketones, UA   Date Value Ref Range Status   08/16/2024 Negative Negative Final     Urobilinogen, UA   Date Value Ref Range Status   10/23/2017 1.0 <2.0 EU/dL Final     WBC, UA   Date Value Ref Range Status   08/16/2024 3 0 - 5 /hpf Final       Other Results:  EKG (my interpretation):     Radiology:  [facility] Fluoro for Pain Management  See OP Notes for results.     IMPRESSION: See OP Notes for results.     This procedure was auto-finalized by: Virtual Radiologist          Assessment/Plan     Aisha Muñoz is a 59 y.o. female with:  1. Achilles tendonitis, bilateral  Assessment & Plan:  F/u podiatry Dr. Palencia in Feb 3, 2025  Apply Lidoderm 5% patch daily  Apply Voltaren 1% gel qid   Can take Mobic with food prn severe pain      2. Chronic left shoulder pain  Assessment & Plan:  Get MRI of left shoulder at [facility]  Cont lidoderm patches  Start Mobic 15mg daily with food      Orders:  -     MRI Shoulder Without Contrast Left; Future; Expected date: 01/10/2025    3. Poorly controlled type 2 diabetes mellitus with gastroparesis  Assessment & Plan:  Check labs today and cont mounjaro 10mg SQ weekly and Actos 45mg  daily  Keep appt with podiatry 1/21/2025   Fasting Glucose ;  90 min after meals <140; bedtime 100-130  If glucose <100 at bedtime, eat small snack    Diet should be high in fiber with 30-35 gms daily, complex carbs, low saturated/trans fat diet,  Avoid fast foods, sweetened drinks, processed , white bread/pasta/rice/potatoes.  Hydate with 6-8 glasses of water daily.exercise 30 min 5 times per week      Never go barefeet, moisturize feet, avoid cutting toenails too deep  See podiatrist at least annually  See ophthalmology annually             Orders:  -     Magnesium; Future; Expected date: 01/10/2025  -     Hemoglobin A1C; Future; Expected date: 01/10/2025  -     Microalbumin/Creatinine Ratio, Urine; Future; Expected date: 01/10/2025  -     Urinalysis; Future; Expected date: 01/10/2025  -     Comprehensive Metabolic Panel; Future; Expected date: 01/10/2025  -     CBC Without Differential; Future; Expected date: 01/10/2025  -     Lipid Panel; Future; Expected date: 01/10/2025    4. Vitamin D deficiency  Assessment & Plan:  Check Vit d level     Orders:  -     Vitamin D; Future; Expected date: 01/10/2025    5. Preventative health care  Assessment & Plan:  RTC every 3 mo  Needs Shingrex, flu, COVID     Cont Mounjaro 10mg SQ weekly and  Actos 45 mg daily  Fasting Glucose ;  90 min after meals <140; bedtime 100-130  If glucose <100 at bedtime, eat small snack    Diet should be high in fiber with 30-35 gms daily, complex carbs, low saturated/trans fat diet,  Avoid fast foods, sweetened drinks, processed , white bread/pasta/rice/potatoes.  Hydate with 6-8 glasses of water daily.exercise 30 min 5 times per week      Never go barefeet, moisturize feet, avoid cutting toenails too deep  See podiatrist at least annually - refer to [place]   See ophthalmology annually - at [facility] this [facility] in [place]     Keep  podiatry (foot) appt at [place]     Cont losartan 25 mg po daily  -Check Bps at home weekly  and prn symptoms for goal BP <130/80.  Avoid Price's table salt; use Mrs. Inocencio Greer no salt   -Start healthy diet high in fiber (25-35 gm daily), low fat dairy, lean protein, low in saturated/trans fat (minimize cheese, creamy salad dressings); high in calcium/magnesium/potassium; 1.5 gm sodium diet; low in processed sugars and foods, ie  WHITE sugars, bread, pasta, rice, ice cream, cookies, cake, doughnuts  -Drink 6-8 glasses of water daily  -Moderate exercise 30 min 5 times per week or 75 min intense exercise biweekly   -Start 8-10 hour intermittent fasting diet   -Avoid eating 3 hours prior to bedtime  -Reduce alcohol to 1-2 servings (1 serving = 1 oz wine, 1 oz hard liquor, 12 oz beer) per day  -Avoid smoking, vaping, stimulant drugs/mediations ie illicit drugs, pseudo-ephedrine  -Use ADD/ADHD meds sparingly  -Minimize NSAIDs    Restart livalo for cholesterol           6. Primary hypertension  Assessment & Plan:  Well-controlled on Cozaar 25mg po daily   -Check Bps at home weekly and prn symptoms for goal BP <130/80.  Avoid Price's table salt; use Mrs. Inocencio Greer no salt   -Start healthy diet high in fiber (25-35 gm daily), low fat dairy, lean protein, low in saturated/trans fat (minimize cheese, creamy salad dressings); high in calcium/magnesium/potassium; 1.5 gm sodium diet; low in processed sugars and foods, ie  WHITE sugars, bread, pasta, rice, ice cream, cookies, cake, doughnuts  -Drink 6-8 glasses of water daily  -Moderate exercise 30 min 5 times per week or 75 min intense exercise biweekly   -Start 8-10 hour intermittent fasting diet   -Avoid eating 3 hours prior to bedtime  -Reduce alcohol to 1-2 servings (1 serving = 1 oz wine, 1 oz hard liquor, 12 oz beer) per day  -Avoid smoking, vaping, stimulant drugs/mediations ie illicit drugs, pseudo-ephedrine  -Use ADD/ADHD meds sparingly  -Minimize NSAIDs         7. Myalgia due to statin  Assessment & Plan:  Restart Livalo 4mg daily        8. Hyperlipidemia associated with type 2 diabetes mellitus  Assessment & Plan:  Start livalo 4mg daily - check FLP today         9. Gastroesophageal reflux disease without esophagitis  Assessment & Plan:  Cont PPI and Zofran  Keep GI appt on 2/7/2025 at 11:20am  Take your PPI 60 minutes before your first protein containing meal (breakfast) every day. Take daily for 8 weeks. If symptoms improve ok to discontinue and use PPI as needed for symptoms.   Take Pepcid 20 mg every evening before bedtime to help with nocturnal symptoms, as needed.  Remain upright for at least 3 hours after eating.   Elevate the head of the bed for nighttime if GERD awakens pt from sleep.  Avoid foods that you have noticed make your symptoms worse (possible triggers include: peppermint, alcohol, chocolate, caffeine, spicy foods, greasy/fried foods, acidic foods-citrus).   Eat 6 small snacks rather than 3 large meals.    Lose weight if you are overweight -- of all of the lifestyle changes you can make, this one is the most effective.         Celiac Disease Panel    H. pylori antigen, stool    Pancreatic elastase, fecal    H. Pylori IgG    Calprotectin, Stool          10. Left genital labial abscess  Assessment & Plan:  Soak in hottest water possible daily  Start Keflex 500mg po tid x 7-10 days  Keep clean and dry using soap and water  Avoid wearing underwearing or clothing that rubs on the lesion    Orders:  -     cephALEXin (KEFLEX) 500 MG capsule; Take 1 capsule (500 mg total) by mouth 3 (three) times daily.  Dispense: 30 capsule; Refill: 1    11. Candida vaginitis  Assessment & Plan:  Keep glucoses well-controlled  Take  diflucan 150mg today and in 4 days    Orders:  -     fluconazole (DIFLUCAN) 150 MG Tab; Take 1 tablet (150 mg total) by mouth once a week.  Dispense: 12 tablet; Refill: 5               I spent a total of 40 minutes on the day of the visit.This includes face to face time and non-face to face time preparing to see the  patient (eg, review of tests), obtaining and/or reviewing separately obtained history, documenting clinical information in the electronic or other health record, independently interpreting results and communicating results to the patient/family/caregiver, or care coordinator.   Code Status:     Tatianna Paulino MD History of Present Illness

## 2025-01-10 ENCOUNTER — OFFICE VISIT (OUTPATIENT)
Dept: PRIMARY CARE CLINIC | Facility: CLINIC | Age: 60
End: 2025-01-10
Payer: MEDICARE

## 2025-01-10 ENCOUNTER — TELEPHONE (OUTPATIENT)
Dept: PRIMARY CARE CLINIC | Facility: CLINIC | Age: 60
End: 2025-01-10

## 2025-01-10 ENCOUNTER — LAB VISIT (OUTPATIENT)
Dept: LAB | Facility: HOSPITAL | Age: 60
End: 2025-01-10
Attending: INTERNAL MEDICINE
Payer: MEDICARE

## 2025-01-10 VITALS
HEART RATE: 77 BPM | HEIGHT: 64 IN | DIASTOLIC BLOOD PRESSURE: 76 MMHG | RESPIRATION RATE: 14 BRPM | OXYGEN SATURATION: 98 % | BODY MASS INDEX: 33.8 KG/M2 | WEIGHT: 198 LBS | SYSTOLIC BLOOD PRESSURE: 134 MMHG

## 2025-01-10 DIAGNOSIS — G89.29 CHRONIC LEFT SHOULDER PAIN: ICD-10-CM

## 2025-01-10 DIAGNOSIS — M25.512 CHRONIC LEFT SHOULDER PAIN: ICD-10-CM

## 2025-01-10 DIAGNOSIS — E11.43 POORLY CONTROLLED TYPE 2 DIABETES MELLITUS WITH GASTROPARESIS: ICD-10-CM

## 2025-01-10 DIAGNOSIS — M79.10 MYALGIA DUE TO STATIN: ICD-10-CM

## 2025-01-10 DIAGNOSIS — K21.9 GASTROESOPHAGEAL REFLUX DISEASE WITHOUT ESOPHAGITIS: ICD-10-CM

## 2025-01-10 DIAGNOSIS — R79.0 LOW MAGNESIUM LEVEL: Primary | ICD-10-CM

## 2025-01-10 DIAGNOSIS — B37.31 CANDIDA VAGINITIS: ICD-10-CM

## 2025-01-10 DIAGNOSIS — E11.65 POORLY CONTROLLED TYPE 2 DIABETES MELLITUS WITH GASTROPARESIS: ICD-10-CM

## 2025-01-10 DIAGNOSIS — N76.4 LEFT GENITAL LABIAL ABSCESS: ICD-10-CM

## 2025-01-10 DIAGNOSIS — E11.65 POORLY CONTROLLED TYPE 2 DIABETES MELLITUS: Primary | ICD-10-CM

## 2025-01-10 DIAGNOSIS — E55.9 VITAMIN D DEFICIENCY: ICD-10-CM

## 2025-01-10 DIAGNOSIS — T46.6X5A MYALGIA DUE TO STATIN: ICD-10-CM

## 2025-01-10 DIAGNOSIS — M76.62 ACHILLES TENDONITIS, BILATERAL: Primary | ICD-10-CM

## 2025-01-10 DIAGNOSIS — E11.69 HYPERLIPIDEMIA ASSOCIATED WITH TYPE 2 DIABETES MELLITUS: ICD-10-CM

## 2025-01-10 DIAGNOSIS — Z00.00 PREVENTATIVE HEALTH CARE: ICD-10-CM

## 2025-01-10 DIAGNOSIS — M76.61 ACHILLES TENDONITIS, BILATERAL: Primary | ICD-10-CM

## 2025-01-10 DIAGNOSIS — I10 PRIMARY HYPERTENSION: Chronic | ICD-10-CM

## 2025-01-10 DIAGNOSIS — E78.5 HYPERLIPIDEMIA ASSOCIATED WITH TYPE 2 DIABETES MELLITUS: ICD-10-CM

## 2025-01-10 PROBLEM — M75.52 CHRONIC SHOULDER BURSITIS, LEFT: Status: RESOLVED | Noted: 2024-09-17 | Resolved: 2025-01-10

## 2025-01-10 LAB
25(OH)D3+25(OH)D2 SERPL-MCNC: 10 NG/ML (ref 30–96)
ALBUMIN SERPL BCP-MCNC: 3.5 G/DL (ref 3.5–5.2)
ALP SERPL-CCNC: 70 U/L (ref 40–150)
ALT SERPL W/O P-5'-P-CCNC: 14 U/L (ref 10–44)
ANION GAP SERPL CALC-SCNC: 8 MMOL/L (ref 8–16)
AST SERPL-CCNC: 13 U/L (ref 10–40)
BILIRUB SERPL-MCNC: 0.8 MG/DL (ref 0.1–1)
BUN SERPL-MCNC: 12 MG/DL (ref 6–20)
CALCIUM SERPL-MCNC: 9.3 MG/DL (ref 8.7–10.5)
CHLORIDE SERPL-SCNC: 109 MMOL/L (ref 95–110)
CHOLEST SERPL-MCNC: 140 MG/DL (ref 120–199)
CHOLEST/HDLC SERPL: 2.5 {RATIO} (ref 2–5)
CO2 SERPL-SCNC: 23 MMOL/L (ref 23–29)
CREAT SERPL-MCNC: 0.8 MG/DL (ref 0.5–1.4)
ERYTHROCYTE [DISTWIDTH] IN BLOOD BY AUTOMATED COUNT: 13.6 % (ref 11.5–14.5)
EST. GFR  (NO RACE VARIABLE): >60 ML/MIN/1.73 M^2
ESTIMATED AVG GLUCOSE: 174 MG/DL (ref 68–131)
GLUCOSE SERPL-MCNC: 161 MG/DL (ref 70–110)
HBA1C MFR BLD: 7.7 % (ref 4–5.6)
HCT VFR BLD AUTO: 40.3 % (ref 37–48.5)
HDLC SERPL-MCNC: 57 MG/DL (ref 40–75)
HDLC SERPL: 40.7 % (ref 20–50)
HGB BLD-MCNC: 13.1 G/DL (ref 12–16)
LDLC SERPL CALC-MCNC: 73 MG/DL (ref 63–159)
MAGNESIUM SERPL-MCNC: 1.4 MG/DL (ref 1.6–2.6)
MCH RBC QN AUTO: 30.8 PG (ref 27–31)
MCHC RBC AUTO-ENTMCNC: 32.5 G/DL (ref 32–36)
MCV RBC AUTO: 95 FL (ref 82–98)
NONHDLC SERPL-MCNC: 83 MG/DL
PLATELET # BLD AUTO: 299 K/UL (ref 150–450)
PMV BLD AUTO: 11.2 FL (ref 9.2–12.9)
POTASSIUM SERPL-SCNC: 4.1 MMOL/L (ref 3.5–5.1)
PROT SERPL-MCNC: 6.9 G/DL (ref 6–8.4)
RBC # BLD AUTO: 4.25 M/UL (ref 4–5.4)
SODIUM SERPL-SCNC: 140 MMOL/L (ref 136–145)
TRIGL SERPL-MCNC: 50 MG/DL (ref 30–150)
WBC # BLD AUTO: 5.8 K/UL (ref 3.9–12.7)

## 2025-01-10 PROCEDURE — 3078F DIAST BP <80 MM HG: CPT | Mod: CPTII,S$GLB,, | Performed by: INTERNAL MEDICINE

## 2025-01-10 PROCEDURE — 3051F HG A1C>EQUAL 7.0%<8.0%: CPT | Mod: CPTII,S$GLB,, | Performed by: INTERNAL MEDICINE

## 2025-01-10 PROCEDURE — 80053 COMPREHEN METABOLIC PANEL: CPT | Performed by: INTERNAL MEDICINE

## 2025-01-10 PROCEDURE — G2211 COMPLEX E/M VISIT ADD ON: HCPCS | Mod: S$GLB,,, | Performed by: INTERNAL MEDICINE

## 2025-01-10 PROCEDURE — 82306 VITAMIN D 25 HYDROXY: CPT | Performed by: INTERNAL MEDICINE

## 2025-01-10 PROCEDURE — 83036 HEMOGLOBIN GLYCOSYLATED A1C: CPT | Performed by: INTERNAL MEDICINE

## 2025-01-10 PROCEDURE — 99999 PR PBB SHADOW E&M-EST. PATIENT-LVL V: CPT | Mod: PBBFAC,,, | Performed by: INTERNAL MEDICINE

## 2025-01-10 PROCEDURE — 80061 LIPID PANEL: CPT | Performed by: INTERNAL MEDICINE

## 2025-01-10 PROCEDURE — 3075F SYST BP GE 130 - 139MM HG: CPT | Mod: CPTII,S$GLB,, | Performed by: INTERNAL MEDICINE

## 2025-01-10 PROCEDURE — 83735 ASSAY OF MAGNESIUM: CPT | Performed by: INTERNAL MEDICINE

## 2025-01-10 PROCEDURE — 85027 COMPLETE CBC AUTOMATED: CPT | Performed by: INTERNAL MEDICINE

## 2025-01-10 PROCEDURE — 1159F MED LIST DOCD IN RCRD: CPT | Mod: CPTII,S$GLB,, | Performed by: INTERNAL MEDICINE

## 2025-01-10 PROCEDURE — 36415 COLL VENOUS BLD VENIPUNCTURE: CPT | Mod: PN | Performed by: INTERNAL MEDICINE

## 2025-01-10 PROCEDURE — 1160F RVW MEDS BY RX/DR IN RCRD: CPT | Mod: CPTII,S$GLB,, | Performed by: INTERNAL MEDICINE

## 2025-01-10 PROCEDURE — 3008F BODY MASS INDEX DOCD: CPT | Mod: CPTII,S$GLB,, | Performed by: INTERNAL MEDICINE

## 2025-01-10 PROCEDURE — 99215 OFFICE O/P EST HI 40 MIN: CPT | Mod: S$GLB,,, | Performed by: INTERNAL MEDICINE

## 2025-01-10 RX ORDER — ERGOCALCIFEROL 1.25 MG/1
50000 CAPSULE ORAL
Qty: 12 CAPSULE | Refills: 3 | Status: SHIPPED | OUTPATIENT
Start: 2025-01-10

## 2025-01-10 RX ORDER — LANOLIN ALCOHOL/MO/W.PET/CERES
400 CREAM (GRAM) TOPICAL 2 TIMES DAILY
Qty: 60 TABLET | Refills: 5 | Status: SHIPPED | OUTPATIENT
Start: 2025-01-10

## 2025-01-10 RX ORDER — CEPHALEXIN 500 MG/1
500 CAPSULE ORAL 3 TIMES DAILY
Qty: 30 CAPSULE | Refills: 1 | Status: SHIPPED | OUTPATIENT
Start: 2025-01-10

## 2025-01-10 RX ORDER — TIRZEPATIDE 12.5 MG/.5ML
12.5 INJECTION, SOLUTION SUBCUTANEOUS
Qty: 0.5 ML | Refills: 3 | Status: SHIPPED | OUTPATIENT
Start: 2025-01-10

## 2025-01-10 RX ORDER — FLUCONAZOLE 150 MG/1
150 TABLET ORAL WEEKLY
Qty: 12 TABLET | Refills: 5 | Status: SHIPPED | OUTPATIENT
Start: 2025-01-10

## 2025-01-10 NOTE — ASSESSMENT & PLAN NOTE
Cont PPI and Zofran  Keep GI appt on 2/7/2025 at 11:20am  Take your PPI 60 minutes before your first protein containing meal (breakfast) every day. Take daily for 8 weeks. If symptoms improve ok to discontinue and use PPI as needed for symptoms.   Take Pepcid 20 mg every evening before bedtime to help with nocturnal symptoms, as needed.  Remain upright for at least 3 hours after eating.   Elevate the head of the bed for nighttime if GERD awakens pt from sleep.  Avoid foods that you have noticed make your symptoms worse (possible triggers include: peppermint, alcohol, chocolate, caffeine, spicy foods, greasy/fried foods, acidic foods-citrus).   Eat 6 small snacks rather than 3 large meals.    Lose weight if you are overweight -- of all of the lifestyle changes you can make, this one is the most effective.         Celiac Disease Panel    H. pylori antigen, stool    Pancreatic elastase, fecal    H. Pylori IgG    Calprotectin, Stool

## 2025-01-10 NOTE — ASSESSMENT & PLAN NOTE
Well-controlled on Cozaar 25mg po daily   -Check Bps at home weekly and prn symptoms for goal BP <130/80.  Avoid Price's table salt; use Mrs. Painter or Davion Greer no salt   -Start healthy diet high in fiber (25-35 gm daily), low fat dairy, lean protein, low in saturated/trans fat (minimize cheese, creamy salad dressings); high in calcium/magnesium/potassium; 1.5 gm sodium diet; low in processed sugars and foods, ie  WHITE sugars, bread, pasta, rice, ice cream, cookies, cake, doughnuts  -Drink 6-8 glasses of water daily  -Moderate exercise 30 min 5 times per week or 75 min intense exercise biweekly   -Start 8-10 hour intermittent fasting diet   -Avoid eating 3 hours prior to bedtime  -Reduce alcohol to 1-2 servings (1 serving = 1 oz wine, 1 oz hard liquor, 12 oz beer) per day  -Avoid smoking, vaping, stimulant drugs/mediations ie illicit drugs, pseudo-ephedrine  -Use ADD/ADHD meds sparingly  -Minimize NSAIDs

## 2025-01-10 NOTE — ASSESSMENT & PLAN NOTE
Check labs today and cont mounjaro 10mg SQ weekly and Actos 45mg daily  Keep appt with podiatry 1/21/2025   Fasting Glucose ;  90 min after meals <140; bedtime 100-130  If glucose <100 at bedtime, eat small snack    Diet should be high in fiber with 30-35 gms daily, complex carbs, low saturated/trans fat diet,  Avoid fast foods, sweetened drinks, processed , white bread/pasta/rice/potatoes.  Hydate with 6-8 glasses of water daily.exercise 30 min 5 times per week      Never go barefeet, moisturize feet, avoid cutting toenails too deep  See podiatrist at least annually  See ophthalmology annually

## 2025-01-10 NOTE — ASSESSMENT & PLAN NOTE
RTC every 3 mo  Needs Shingrex, flu, COVID     Cont Mounjaro 10mg SQ weekly and  Actos 45 mg daily  Fasting Glucose ;  90 min after meals <140; bedtime 100-130  If glucose <100 at bedtime, eat small snack    Diet should be high in fiber with 30-35 gms daily, complex carbs, low saturated/trans fat diet,  Avoid fast foods, sweetened drinks, processed , white bread/pasta/rice/potatoes.  Hydate with 6-8 glasses of water daily.exercise 30 min 5 times per week      Never go barefeet, moisturize feet, avoid cutting toenails too deep  See podiatrist at least annually - refer to [place]   See ophthalmology annually - at [facility] [place]     Keep  podiatry (foot) appt at [place]     Cont losartan 25 mg po daily  -Check Bps at home weekly and prn symptoms for goal BP <130/80.  Avoid Price's table salt; use Mrs. Painter or Davion Greer no salt   -Start healthy diet high in fiber (25-35 gm daily), low fat dairy, lean protein, low in saturated/trans fat (minimize cheese, creamy salad dressings); high in calcium/magnesium/potassium; 1.5 gm sodium diet; low in processed sugars and foods, ie  WHITE sugars, bread, pasta, rice, ice cream, cookies, cake, doughnuts  -Drink 6-8 glasses of water daily  -Moderate exercise 30 min 5 times per week or 75 min intense exercise biweekly   -Start 8-10 hour intermittent fasting diet   -Avoid eating 3 hours prior to bedtime  -Reduce alcohol to 1-2 servings (1 serving = 1 oz wine, 1 oz hard liquor, 12 oz beer) per day  -Avoid smoking, vaping, stimulant drugs/mediations ie illicit drugs, pseudo-ephedrine  -Use ADD/ADHD meds sparingly  -Minimize NSAIDs    Restart livalo for cholesterol

## 2025-01-10 NOTE — ASSESSMENT & PLAN NOTE
F/u podiatry Dr. Palencia in Feb 3, 2025  Apply Lidoderm 5% patch daily  Apply Voltaren 1% gel qid   Can take Mobic with food prn severe pain

## 2025-01-10 NOTE — PATIENT INSTRUCTIONS
Chronic left shoulder pain  Assessment & Plan:  Get MRI of left shoulder at [facility]  Cont lidoderm patches  Start Mobic 15mg daily with food      Orders:  -     MRI Shoulder Without Contrast Left; Future; Expected date: 01/10/2025    Achilles tendonitis, bilateral  Assessment & Plan:  F/u podiatry Dr. Palencia in Feb 3, 2025  Apply Lidoderm 5% patch daily  Apply Voltaren 1% gel qid       Poorly controlled type 2 diabetes mellitus with gastroparesis  Assessment & Plan:  Check labs today and cont mounjaro 10mg SQ weekly and Actos 45mg daily  Keep appt with podiatry 1/21/2025   Fasting Glucose ;  90 min after meals <140; bedtime 100-130  If glucose <100 at bedtime, eat small snack    Diet should be high in fiber with 30-35 gms daily, complex carbs, low saturated/trans fat diet,  Avoid fast foods, sweetened drinks, processed , white bread/pasta/rice/potatoes.  Hydate with 6-8 glasses of water daily.exercise 30 min 5 times per week      Never go barefeet, moisturize feet, avoid cutting toenails too deep  See podiatrist at least annually  See ophthalmology annually             Orders:  -     Magnesium; Future; Expected date: 01/10/2025  -     Hemoglobin A1C; Future; Expected date: 01/10/2025  -     Microalbumin/Creatinine Ratio, Urine; Future; Expected date: 01/10/2025  -     Urinalysis; Future; Expected date: 01/10/2025  -     Comprehensive Metabolic Panel; Future; Expected date: 01/10/2025  -     CBC Without Differential; Future; Expected date: 01/10/2025  -     Lipid Panel; Future; Expected date: 01/10/2025    Vitamin D deficiency  Assessment & Plan:  Check Vit d level     Orders:  -     Vitamin D; Future; Expected date: 01/10/2025    Preventative health care  Assessment & Plan:  RTC every 3 mo  POCT glucose     Refill Mounjaro 10mg SQ weekly   Refill Actos 45 mg daily  Fasting Glucose ;  90 min after meals <140; bedtime 100-130  If glucose <100 at bedtime, eat small snack    Diet should be high in fiber with  30-35 gms daily, complex carbs, low saturated/trans fat diet,  Avoid fast foods, sweetened drinks, processed , white bread/pasta/rice/potatoes.  Hydate with 6-8 glasses of water daily.exercise 30 min 5 times per week      Never go barefeet, moisturize feet, avoid cutting toenails too deep  See podiatrist at least annually - refer in [place]   See ophthalmology annually - [facility] [place]       Refer to ortho for left shoulder     Toradol 60mg IM    Get FLU, COVID, Shingrex    Make podiatry (foot) appt at [place]     Schedule [facility] at [place]     Start losartan 25 mg po daily  -Check Bps at home weekly and prn symptoms for goal BP <130/80.  Avoid Price's table salt; use Mrs. Painter or Davion Greer no salt   -Start healthy diet high in fiber (25-35 gm daily), low fat dairy, lean protein, low in saturated/trans fat (minimize cheese, creamy salad dressings); high in calcium/magnesium/potassium; 1.5 gm sodium diet; low in processed sugars and foods, ie  WHITE sugars, bread, pasta, rice, ice cream, cookies, cake, doughnuts  -Drink 6-8 glasses of water daily  -Moderate exercise 30 min 5 times per week or 75 min intense exercise biweekly   -Start 8-10 hour intermittent fasting diet   -Avoid eating 3 hours prior to bedtime  -Reduce alcohol to 1-2 servings (1 serving = 1 oz wine, 1 oz hard liquor, 12 oz beer) per day  -Avoid smoking, vaping, stimulant drugs/mediations ie illicit drugs, pseudo-ephedrine  -Use ADD/ADHD meds sparingly  -Minimize NSAIDs    Restart livalo for cholesterol           Primary hypertension  Assessment & Plan:  Well-controlled on Cozaar 25mg po daily   -Check Bps at home weekly and prn symptoms for goal BP <130/80.  Avoid Price's table salt; use Mrs. Painter or Davion Greer no salt   -Start healthy diet high in fiber (25-35 gm daily), low fat dairy, lean protein, low in saturated/trans fat (minimize cheese, creamy salad dressings); high in calcium/magnesium/potassium; 1.5 gm  sodium diet; low in processed sugars and foods, ie  WHITE sugars, bread, pasta, rice, ice cream, cookies, cake, doughnuts  -Drink 6-8 glasses of water daily  -Moderate exercise 30 min 5 times per week or 75 min intense exercise biweekly   -Start 8-10 hour intermittent fasting diet   -Avoid eating 3 hours prior to bedtime  -Reduce alcohol to 1-2 servings (1 serving = 1 oz wine, 1 oz hard liquor, 12 oz beer) per day  -Avoid smoking, vaping, stimulant drugs/mediations ie illicit drugs, pseudo-ephedrine  -Use ADD/ADHD meds sparingly  -Minimize NSAIDs         Myalgia due to statin  Assessment & Plan:  Restart Livalo 4mg daily       Hyperlipidemia associated with type 2 diabetes mellitus  Assessment & Plan:  Start livalo 4mg daily - check FLP today         Gastroesophageal reflux disease without esophagitis  Assessment & Plan:  Cont PPI and Zofran  Keep GI appt on 2/7/2025 at 11:20am  Take your PPI 60 minutes before your first protein containing meal (breakfast) every day. Take daily for 8 weeks. If symptoms improve ok to discontinue and use PPI as needed for symptoms.   Take Pepcid 20 mg every evening before bedtime to help with nocturnal symptoms, as needed.  Remain upright for at least 3 hours after eating.   Elevate the head of the bed for nighttime if GERD awakens pt from sleep.  Avoid foods that you have noticed make your symptoms worse (possible triggers include: peppermint, alcohol, chocolate, caffeine, spicy foods, greasy/fried foods, acidic foods-citrus).   Eat 6 small snacks rather than 3 large meals.    Lose weight if you are overweight -- of all of the lifestyle changes you can make, this one is the most effective.         Celiac Disease Panel    H. pylori antigen, stool    Pancreatic elastase, fecal    H. Pylori IgG    Calprotectin, Stool          Left genital labial abscess  -     cephALEXin (KEFLEX) 500 MG capsule; Take 1 capsule (500 mg total) by mouth 3 (three) times daily.  Dispense: 30 capsule; Refill:  1    Candida vaginitis  -     fluconazole (DIFLUCAN) 150 MG Tab; Take 1 tablet (150 mg total) by mouth once a week.  Dispense: 12 tablet; Refill: 5

## 2025-01-11 PROBLEM — N76.4 LEFT GENITAL LABIAL ABSCESS: Status: ACTIVE | Noted: 2025-01-11

## 2025-01-11 NOTE — ASSESSMENT & PLAN NOTE
Soak in hottest water possible daily  Start Keflex 500mg po tid x 7-10 days  Keep clean and dry using soap and water  Avoid wearing underwearing or clothing that rubs on the lesion

## 2025-01-13 ENCOUNTER — TELEPHONE (OUTPATIENT)
Dept: PRIMARY CARE CLINIC | Facility: CLINIC | Age: 60
End: 2025-01-13
Payer: MEDICARE

## 2025-01-16 ENCOUNTER — HOSPITAL ENCOUNTER (OUTPATIENT)
Dept: RADIOLOGY | Facility: HOSPITAL | Age: 60
Discharge: HOME | End: 2025-01-16
Attending: INTERNAL MEDICINE
Payer: MEDICARE

## 2025-01-16 DIAGNOSIS — M25.512 CHRONIC LEFT SHOULDER PAIN: ICD-10-CM

## 2025-01-16 DIAGNOSIS — G89.29 CHRONIC LEFT SHOULDER PAIN: ICD-10-CM

## 2025-01-16 PROCEDURE — 73221 MRI JOINT UPR EXTREM W/O DYE: CPT | Mod: TC,LT

## 2025-01-16 PROCEDURE — 73221 MRI JOINT UPR EXTREM W/O DYE: CPT | Mod: 26,LT,, | Performed by: RADIOLOGY

## 2025-01-17 ENCOUNTER — PATIENT MESSAGE (OUTPATIENT)
Dept: PODIATRY | Facility: CLINIC | Age: 60
End: 2025-01-17
Payer: MEDICARE

## 2025-01-17 ENCOUNTER — TELEPHONE (OUTPATIENT)
Dept: PODIATRY | Facility: CLINIC | Age: 60
End: 2025-01-17
Payer: MEDICARE

## 2025-01-17 NOTE — TELEPHONE ENCOUNTER
Call pt to get rescheduled from 01/21/2025 with Dr. Zacarias due to bad weather and clinic closure. Pt was made aware and I was able to help assist with getting her rescheduled for the next available. Pt verbally confirmed appointment date and time.

## 2025-01-23 ENCOUNTER — OFFICE VISIT (OUTPATIENT)
Dept: PRIMARY CARE CLINIC | Facility: CLINIC | Age: 60
End: 2025-01-23
Payer: MEDICARE

## 2025-01-23 DIAGNOSIS — T46.6X5A MYALGIA DUE TO STATIN: ICD-10-CM

## 2025-01-23 DIAGNOSIS — N76.4 LEFT GENITAL LABIAL ABSCESS: Primary | ICD-10-CM

## 2025-01-23 DIAGNOSIS — M79.10 MYALGIA DUE TO STATIN: ICD-10-CM

## 2025-01-23 DIAGNOSIS — G89.29 CHRONIC LEFT SHOULDER PAIN: ICD-10-CM

## 2025-01-23 DIAGNOSIS — E66.09 CLASS 1 OBESITY DUE TO EXCESS CALORIES WITH SERIOUS COMORBIDITY AND BODY MASS INDEX (BMI) OF 30.0 TO 30.9 IN ADULT: ICD-10-CM

## 2025-01-23 DIAGNOSIS — I10 PRIMARY HYPERTENSION: Chronic | ICD-10-CM

## 2025-01-23 DIAGNOSIS — M76.62 ACHILLES TENDONITIS, BILATERAL: ICD-10-CM

## 2025-01-23 DIAGNOSIS — M25.512 CHRONIC LEFT SHOULDER PAIN: ICD-10-CM

## 2025-01-23 DIAGNOSIS — Z88.8 ALLERGY TO ACE INHIBITORS: ICD-10-CM

## 2025-01-23 DIAGNOSIS — M76.61 ACHILLES TENDONITIS, BILATERAL: ICD-10-CM

## 2025-01-23 DIAGNOSIS — E78.5 HYPERLIPIDEMIA ASSOCIATED WITH TYPE 2 DIABETES MELLITUS: ICD-10-CM

## 2025-01-23 DIAGNOSIS — E66.811 CLASS 1 OBESITY DUE TO EXCESS CALORIES WITH SERIOUS COMORBIDITY AND BODY MASS INDEX (BMI) OF 30.0 TO 30.9 IN ADULT: ICD-10-CM

## 2025-01-23 DIAGNOSIS — E11.69 HYPERLIPIDEMIA ASSOCIATED WITH TYPE 2 DIABETES MELLITUS: ICD-10-CM

## 2025-01-23 PROCEDURE — 98006 SYNCH AUDIO-VIDEO EST MOD 30: CPT | Mod: 95,,, | Performed by: INTERNAL MEDICINE

## 2025-01-23 PROCEDURE — 3051F HG A1C>EQUAL 7.0%<8.0%: CPT | Mod: CPTII,95,, | Performed by: INTERNAL MEDICINE

## 2025-01-23 PROCEDURE — G2211 COMPLEX E/M VISIT ADD ON: HCPCS | Mod: 95,,, | Performed by: INTERNAL MEDICINE

## 2025-01-23 RX ORDER — PITAVASTATIN CALCIUM 4.18 MG/1
4 TABLET, FILM COATED ORAL DAILY
Status: CANCELLED
Start: 2025-01-23 | End: 2026-01-23

## 2025-01-23 NOTE — ASSESSMENT & PLAN NOTE
Resolved after hot soaks and keflex  Need to wear non-restrictive, non-chafing underwear preferably with cotton panels  Keep perineal area clean and dry and avoid scratching or rubbing   The patient is s/p Watchman device implant, presents today for post Watchman GLORIA.   As per Dr. Knight, GLORIA showed no leak  As per Dr. Tabares, will d/c Plavix, continue Aspirin.

## 2025-01-23 NOTE — ASSESSMENT & PLAN NOTE
F/u podiatry Dr. Palencia in Feb 3, 2025  Apply Lidoderm 5% patch daily - apply ice qid x 20 min and can alternate with heat  Apply Voltaren 1% gel qid   Cont Tylenol ES bid and then add Mobic prn severe pain

## 2025-01-23 NOTE — ASSESSMENT & PLAN NOTE
Pt encouraged to stop eating simple sugars ie cookies and candy danica in the middle of the night  Drink 6-8 glasses of water daily  Exercise 30 min 5 times per week, decrease portion sizes by 50%; encourage plant-based diet;  drink 6-8 glasses of water daily, avoid fast foods, creamy, rich foods danica ice cream, cheese creamy salad dressings;avoid eating 3 hours prior to bedtime; consider 8-10 hour intermittent diet; avoid simple sugars danica white bread, rice, potatoes, noodles/pasta; no sweetened drinks.    Reduce alcohol to max 1-2 drinks per day (1 drink = 12 beer;  1 oz hard liquor; 5 oz wine)    Limit eating at restaurants to once per week; avoid fast foods, junk foods, impulsive eating. Drink 1 glass of lukewarm water before eating. Chew 100 times before swallowing food.     Use the Lose It Erick to track calories and aim for eating less than 1200 calories per day.

## 2025-01-23 NOTE — ASSESSMENT & PLAN NOTE
Keep ortho appt   Cont lidoderm 5% patches and tylenol 1 gm po bid  Refer to PT for eval and tx  Avoid painful left shoulder motions

## 2025-01-23 NOTE — PROGRESS NOTES
[facility] Progress Note      Audiovisual Visit  Location:  [place], [place]      Chief Complaint     No chief complaint on file.  Left labial abscess    Subjective:      History of Present Illness:  Aisha Muñoz is a 59 y.o. female     Left Labial abscess x 5 days:  s/p Keflex 500mg tid x 4 days with complete resolution     Bilateral Achilles Tendonitis as per podiatry, Dr. Palencia :   -pt cont to have severe pain but now more proximal  -takes Tylenol and oxycodone and gabapentin 100mg qhs but is not taking Mobic   -does not consistently use lidoderm 5% patches   Has appt 2/03 with Dr. Palencia     HLD: currently taking livalo without myalgias    Chronic left shoulder pain x 10 mo  MRI left shoulder 1/16/2025::   Calcific tendinitis of the infraspinatus.  2. Fraying of the superior to posterior labrum.  3. Subacromial subdeltoid bursitis.     -had extreme relief with Toradol 60mg IM at last visit with only one bout of severe pain since the injection  -uses lidoderm 5% patch and tylenol for pain relief   -Shoulder ortho called to schedule appt for possible injection on 1/21 but had to reschedule due to weather since cervical C7-T1 DAVID in 12/2024 not effective to improve left shoulder pain  -pt is interested in PT     HTN: now on Losartan 25mg daily since allergic to lisinopril with excellent BP control     Obesity: pt admits to awakening in the middle of the night and eating candy and cookies and therefore is unable to lose weight.     FH: pancreatic cancer (mom and maternal 1st cousin once removed): referred go genetics with labs drawn 5/2024 with variant of undetermined significance     Chronic right ankle pain: sees Ortho Claire Palencia-PA  last 7/2024 who recommended RICE, heel lift and PT     Poorly-controlled DM:  HbA1C 8.8 (8.6) needs Actos 45mg daily; needs refill mounjaro 10mg SQ weekly off x 4 wks; eating baked potatoes, adds sugar 5 tsp in his cofffee, sweetened iced tea; drinks a  lot of water  -trying to eat healthier foods ie avoiding fried foods    Mixed HLD: did not tolerate statins due to cramps; insurance did NOT pay for Livalo   HLD: LDL 70, HDL 65, TG 50 on no lipid meds    Tobacco: stopped 1ppd x 14 years; needs LDCT now *smoked x 18 years     Left shoulder pain with decreased ROM x 1 year:  9/10 taking lidoderm 5% patches; no trauma; does not sleep on left side; would like to be evaluated     Chronic bilateral LBP due to spondylosis with sciatica: gets epidural injections by Dr. Ronal alfred approx every 3 mo i last seen 2024   -has hx L5-S1 anterior instrumented fusion     SH: works at [employer]   SH: no drinking, drugs, alcohol; 9 children; works at [employer] as a    FH: pancreatic cancer -mom, MGM, maternal aunt all  from it 54 y/o, 50s from pancreatic cancer .     Past Medical History:  Past Medical History:   Diagnosis Date    Acute midline low back pain with right-sided sciatica 2023    Acute viral syndrome 2023    Arthritis     Brain tumor (benign)     Chronic back pain     Chronic shoulder bursitis, left 2024    Diabetes mellitus     Diabetic gastroparesis associated with type 2 diabetes mellitus 2023    Gastroesophageal reflux disease without esophagitis 2023    Genetic testing 2024    MSH6 variant of uncertain significance, see [facility] results in Media.    Hypertension     Pain 2024       Past Surgical History:  Past Surgical History:   Procedure Laterality Date    BACK SURGERY      no hardware  fusion    BRAIN SURGERY      benign tumor removed from brain    CARPAL TUNNEL RELEASE Bilateral     COLONOSCOPY N/A 2024    Procedure: COLONOSCOPY;  Surgeon: Alden Luna MD;  Location: [address]);  Service: Endoscopy;  Laterality: N/A;  Ref By Dr. Hudson  Procedure: Colonoscopy  Diagnosis: Screening colonoscopy  procedure timein-12 weeks  Prep: Suflav   Diabetes  24-Precall complete,  instr portal, confirmed new arrrival time of 745am and pt holding Mounjaro-DS  24- per message from Eula Lee    EPIDURAL STEROID INJECTION INTO CERVICAL SPINE N/A 2024    Procedure: DAVID C7-T1;  Surgeon: Eduar Neri MD;  Location: [facility];  Service: Pain Management;  Laterality: N/A;  no ac-not taking Tirzepatide any longer    HYSTERECTOMY      ovaries in situ       Allergies:  Review of patient's allergies indicates:   Allergen Reactions    Fish containing products Shortness Of Breath, Swelling and Rash    Lisinopril Other (See Comments)     Pt has dry cough continuously    Grape Itching    Strawberries [strawberry] Itching    Banana Itching    Lyrica [pregabalin] Other (See Comments)     headache    Pineapple Hives and Itching       Home Medications:  Current Outpatient Medications   Medication Sig Dispense Refill    acetaminophen (TYLENOL) 325 MG tablet Take 325 mg by mouth as needed for Pain.      albuterol (VENTOLIN HFA) 90 mcg/actuation inhaler Inhale 2 puffs into the lungs every 4 (four) hours as needed for Wheezing. Rescue 18 g 1    cephALEXin (KEFLEX) 500 MG capsule Take 1 capsule (500 mg total) by mouth 3 (three) times daily. 30 capsule 1    diclofenac sodium (VOLTAREN) 1 % Gel 4 grams to effected area 4 times daily do not exceed 32 grams 1 each 0    EPINEPHrine (EPIPEN) 0.3 mg/0.3 mL AtIn Inject into the muscle.      ergocalciferol (ERGOCALCIFEROL) 50,000 unit Cap Take 1 capsule (50,000 Units total) by mouth every 7 days. 12 capsule 3    fluconazole (DIFLUCAN) 150 MG Tab Take 1 tablet (150 mg total) by mouth once a week. 12 tablet 5    fluticasone propionate (FLONASE) 50 mcg/actuation nasal spray 1 spray (50 mcg total) by Each Nostril route 2 (two) times daily as needed for Rhinitis. 16 g 3    gabapentin (NEURONTIN) 100 MG capsule Take 1-3 capsules (100-300 mg total) by mouth every evening. 90 capsule 1    LIDOcaine (LIDODERM) 5 % SMARTSI-2 Patch(s) Topical Daily PRN       losartan (COZAAR) 25 MG tablet Take 1 tablet (25 mg total) by mouth once daily. 90 tablet 3    magnesium oxide (MAG-OX) 400 mg (241.3 mg magnesium) tablet Take 1 tablet (400 mg total) by mouth 2 (two) times daily. 60 tablet 5    meloxicam (MOBIC) 15 MG tablet Take 1 tablet (15 mg total) by mouth once daily. 14 tablet 0    ondansetron (ZOFRAN-ODT) 4 MG TbDL Take 1 tablet (4 mg total) by mouth every 8 (eight) hours as needed (nausea). 90 tablet 3    oxyCODONE-acetaminophen (PERCOCET)  mg per tablet Take 1 tablet by mouth 3 (three) times daily as needed. 90 tablet 0    pantoprazole (PROTONIX) 40 MG tablet Take 1 tablet (40 mg total) by mouth once daily. 90 tablet 3    pioglitazone (ACTOS) 45 MG tablet Take 1 tablet (45 mg total) by mouth once daily. 90 tablet 3    pitavastatin calcium (LIVALO) 4 mg Tab Take 4 mg by mouth once daily. (Patient not taking: Reported on 1/10/2025) 90 tablet 3    tirzepatide (MOUNJARO) 12.5 mg/0.5 mL PnIj Inject 12.5 mg into the skin every 7 days. 0.5 mL 3    tirzepatide 10 mg/0.5 mL PnIj Inject 10 mg into the skin every 7 days. 0.5 mL 3    tiZANidine (ZANAFLEX) 4 MG tablet Take 1 tablet (4 mg total) by mouth nightly as needed (muscle tension). 60 tablet 0     No current facility-administered medications for this visit.        Family History:  Family History   Problem Relation Name Age of Onset    Cirrhosis Mother Constantine     Leukemia Mother Constantine     Cirrhosis Maternal Grandmother      Cirrhosis Other Lyla 30 - 39    Pancreatic cancer Other Lyla 60 - 69    Lupus Daughter      Breast cancer Other      Colon cancer Neg Hx      Ovarian cancer Neg Hx         Social History:  Social History     Tobacco Use    Smoking status: Former     Current packs/day: 0.00     Average packs/day: 0.5 packs/day for 1 year (0.5 ttl pk-yrs)     Types: Cigarettes     Start date: 2012     Quit date: 2013     Years since quittin.2    Smokeless tobacco: Never   Substance Use Topics    Alcohol  use: Yes     Comment: occasionally    Drug use: No       Review of Systems:  Pertinent positives and negatives listed in HPI. All other systems are reviewed and are negative.    Health Maintaince :   Health Maintenance Topics with due status: Not Due       Topic Last Completion Date    TETANUS VACCINE 05/25/2019    Colorectal Cancer Screening 05/24/2024    Diabetes Urine Screening 08/16/2024    Diabetic Eye Exam 09/20/2024    Mammogram 10/18/2024    Lipid Panel 01/10/2025    Hemoglobin A1c 01/10/2025    RSV Vaccine (Age 60+ and Pregnant patients) Not Due           Eye: this Friday at [facility] [place]   Needs foot referral   Dental: all pulled out in March 2023     IHepatitis C:  Cancer Screening:  refuses flu and COVID   PAP: UTD  Mammogram: UTD 10/2024   Colonoscopy: UTD5/24/2024  DEXA:   LDCT: 10/2024 WNL    The 10-year ASCVD risk score (Sara BROWN, et al., 2019) is: 12%    Values used to calculate the score:      Age: 59 years      Sex: Female      Is Non- : Yes      Diabetic: Yes      Tobacco smoker: No      Systolic Blood Pressure: 134 mmHg      Is BP treated: Yes      HDL Cholesterol: 57 mg/dL      Total Cholesterol: 140 mg/dL      Objective:   There were no vitals taken for this visit.     There is no height or weight on file to calculate BMI.       Physical Examination:  General: Alert and awake in no apparent distress  Neck:   Cervical nodes not enlarged (No submental, submandibular, preauricular, posterior auricular or occipital adenopathy); supple; no bruits  Cardio:  Regular rate and rhythm with normal S1 and S2; no murmurs or rubs  Resp:  CTAB; respirations unlabored; no wheezes, crackles or rhonchi  Shoulders:      left shoulder with limited external rotation and tender left anterior shoulder   Neuro:  AAOx3; cooperative and pleasant with no focal deficits    Laboratory:      Most Recent Data:  Lab Results   Component Value Date    WBC 5.80 01/10/2025    HGB  13.1 01/10/2025    HCT 40.3 01/10/2025     01/10/2025    CHOL 140 01/10/2025    TRIG 50 01/10/2025    HDL 57 01/10/2025    ALT 14 01/10/2025    AST 13 01/10/2025     01/10/2025    K 4.1 01/10/2025     01/10/2025    BUN 12 01/10/2025    CO2 23 01/10/2025    TSH 2.7 08/24/2006    HGBA1C 7.7 (H) 01/10/2025              CBC:   WBC   Date Value Ref Range Status   01/10/2025 5.80 3.90 - 12.70 K/uL Final     Hemoglobin   Date Value Ref Range Status   01/10/2025 13.1 12.0 - 16.0 g/dL Final     Hematocrit   Date Value Ref Range Status   01/10/2025 40.3 37.0 - 48.5 % Final     Platelets   Date Value Ref Range Status   01/10/2025 299 150 - 450 K/uL Final     MCV   Date Value Ref Range Status   01/10/2025 95 82 - 98 fL Final     RDW   Date Value Ref Range Status   01/10/2025 13.6 11.5 - 14.5 % Final     BMP:   Sodium   Date Value Ref Range Status   01/10/2025 140 136 - 145 mmol/L Final     Potassium   Date Value Ref Range Status   01/10/2025 4.1 3.5 - 5.1 mmol/L Final     Chloride   Date Value Ref Range Status   01/10/2025 109 95 - 110 mmol/L Final     CO2   Date Value Ref Range Status   01/10/2025 23 23 - 29 mmol/L Final     BUN   Date Value Ref Range Status   01/10/2025 12 6 - 20 mg/dL Final     Creatinine   Date Value Ref Range Status   01/10/2025 0.8 0.5 - 1.4 mg/dL Final     Glucose   Date Value Ref Range Status   01/10/2025 161 (H) 70 - 110 mg/dL Final     Calcium   Date Value Ref Range Status   01/10/2025 9.3 8.7 - 10.5 mg/dL Final     Magnesium   Date Value Ref Range Status   01/10/2025 1.4 (L) 1.6 - 2.6 mg/dL Final     LFTs:   Total Protein   Date Value Ref Range Status   01/10/2025 6.9 6.0 - 8.4 g/dL Final     Albumin   Date Value Ref Range Status   01/10/2025 3.5 3.5 - 5.2 g/dL Final     Total Bilirubin   Date Value Ref Range Status   01/10/2025 0.8 0.1 - 1.0 mg/dL Final     Comment:     For infants and newborns, interpretation of results should be based  on gestational age, weight and in agreement  "with clinical  observations.    Premature Infant recommended reference ranges:  Up to 24 hours.............<8.0 mg/dL  Up to 48 hours............<12.0 mg/dL  3-5 days..................<15.0 mg/dL  6-29 days.................<15.0 mg/dL       AST   Date Value Ref Range Status   01/10/2025 13 10 - 40 U/L Final     Alkaline Phosphatase   Date Value Ref Range Status   01/10/2025 70 40 - 150 U/L Final     ALT   Date Value Ref Range Status   01/10/2025 14 10 - 44 U/L Final     Coags: No results found for: "INR", "PROTIME", "PTT"  FLP:      Lab Results   Component Value Date    CHOL 140 01/10/2025    CHOL 145 08/16/2024    CHOL 159 08/25/2023     Lab Results   Component Value Date    HDL 57 01/10/2025    HDL 65 08/16/2024    HDL 75 08/25/2023     Lab Results   Component Value Date    LDLCALC 73.0 01/10/2025    LDLCALC 70.0 08/16/2024    LDLCALC 75.0 08/25/2023     Lab Results   Component Value Date    TRIG 50 01/10/2025    TRIG 50 08/16/2024    TRIG 45 08/25/2023     Lab Results   Component Value Date    CHOLHDL 40.7 01/10/2025    CHOLHDL 44.8 08/16/2024    CHOLHDL 47.2 08/25/2023      DM:      Hemoglobin A1C   Date Value Ref Range Status   01/10/2025 7.7 (H) 4.0 - 5.6 % Final     Comment:     ADA Screening Guidelines:  5.7-6.4%  Consistent with prediabetes  >or=6.5%  Consistent with diabetes    High levels of fetal hemoglobin interfere with the HbA1C  assay. Heterozygous hemoglobin variants (HbS, HgC, etc)do  not significantly interfere with this assay.   However, presence of multiple variants may affect accuracy.     08/16/2024 8.8 (H) 4.0 - 5.6 % Final     Comment:     ADA Screening Guidelines:  5.7-6.4%  Consistent with prediabetes  >or=6.5%  Consistent with diabetes    High levels of fetal hemoglobin interfere with the HbA1C  assay. Heterozygous hemoglobin variants (HbS, HgC, etc)do  not significantly interfere with this assay.   However, presence of multiple variants may affect accuracy.     04/01/2024 8.6 (H) 4.0 - " "5.6 % Final     Comment:     ADA Screening Guidelines:  5.7-6.4%  Consistent with prediabetes  >or=6.5%  Consistent with diabetes    High levels of fetal hemoglobin interfere with the HbA1C  assay. Heterozygous hemoglobin variants (HbS, HgC, etc)do  not significantly interfere with this assay.   However, presence of multiple variants may affect accuracy.       LDL Cholesterol   Date Value Ref Range Status   01/10/2025 73.0 63.0 - 159.0 mg/dL Final     Comment:     The National Cholesterol Education Program (NCEP) has set the  following guidelines (reference values) for LDL Cholesterol:  Optimal.......................<130 mg/dL  Borderline High...............130-159 mg/dL  High..........................160-189 mg/dL  Very High.....................>190 mg/dL       Creatinine   Date Value Ref Range Status   01/10/2025 0.8 0.5 - 1.4 mg/dL Final     Thyroid:   TSH   Date Value Ref Range Status   08/24/2006 2.7 0.4 - 4.0 uIU/ml Final     Anemia: No results found for: "IRON", "TIBC", "FERRITIN", "OIOIBJFL94", "FOLATE"  Cardiac:   Troponin I   Date Value Ref Range Status   05/26/2017 <0.006 0.000 - 0.026 ng/mL Final     Comment:     The reference interval for Troponin I represents the 99th percentile   cutoff   for our facility and is consistent with 3rd generation assay   performance.       BNP   Date Value Ref Range Status   05/26/2017 <10 0 - 99 pg/mL Final     Comment:     Values of less than 100 pg/ml are consistent with non-CHF populations.     Urinalysis:   Urine Culture, Routine   Date Value Ref Range Status   04/23/2007   Final    >100,000 ORGANISMS/ML -ESCHERICHIA COLI  Amikacin             <=16       SENSITIVE     Amox/K Clav          <=8/4      SENSITIVE     Ceftriaxone          <=8        SENSITIVE     Cefazolin            <=8        SENSITIVE     Ciprofloxacin        <=1        SENSITIVE     Nitrofurantoin       <=32       SENSITIVE     Gentamicin           <=4        SENSITIVE     Bactrim              <=2/38  "    SENSITIVE     Tetracycline         <=4        SENSITIVE     Timentin             <=16       SENSITIVE     Tobramycin           <=4        SENSITIVE          Color, UA   Date Value Ref Range Status   08/16/2024 Yellow Yellow, Straw, Nelly Final     Specific Gravity, UA   Date Value Ref Range Status   08/16/2024 1.020 1.005 - 1.030 Final     Nitrite, UA   Date Value Ref Range Status   08/16/2024 Positive (A) Negative Final     Ketones, UA   Date Value Ref Range Status   08/16/2024 Negative Negative Final     Urobilinogen, UA   Date Value Ref Range Status   10/23/2017 1.0 <2.0 EU/dL Final     WBC, UA   Date Value Ref Range Status   08/16/2024 3 0 - 5 /hpf Final       Other Results:  EKG (my interpretation):     Radiology:  MRI Shoulder Without Contrast Left  Narrative: EXAMINATION:  MRI SHOULDER WITHOUT CONTRAST LEFT    CLINICAL HISTORY:  Shoulder pain, rotator cuff disorder suspected, xray done;  Pain in left shoulder    TECHNIQUE:  MRI left shoulder performed without contrast per routine protocol.    COMPARISON:  Radiographs 10/18/2024    FINDINGS:  Rotator cuff: There is focal signal void at the footprint of the infraspinatus tendon with associated intra tendinous edema consistent with calcific tendinitis.  No evidence for rotator cuff tendon tear.  Supraspinatus, subscapularis, and teres minor tendons are unremarkable.  Muscle bulk is maintained.    Labrum: Fraying of the superior to posterior labrum.    Biceps: Long head biceps tendon is intact.    Bone: No fracture or marrow infiltrative process.    Acromioclavicular joint: Moderate arthrosis with marrow edema.    Cartilage: Articular cartilage of the glenohumeral joint is preserved.    Miscellaneous: Mild thickening of subacromial subdeltoid bursa.  Impression: 1. Calcific tendinitis of the infraspinatus.  2. Fraying of the superior to posterior labrum.  3. Subacromial subdeltoid bursitis.    Electronically signed by: Osmel Saini  MD  Date:    01/16/2025  Time:    09:51          Assessment/Plan     Aisha Muñoz is a 59 y.o. female with:  1. Left genital labial abscess  Assessment & Plan:  Resolved after hot soaks and keflex  Need to wear non-restrictive, non-chafing underwear preferably with cotton panels  Keep perineal area clean and dry and avoid scratching or rubbing      2. Hyperlipidemia associated with type 2 diabetes mellitus  Assessment & Plan:  Start livalo 4mg daily - check FLP with next labs      3. Primary hypertension  Assessment & Plan:  Well-controlled on Cozaar 25mg po daily   -Check Bps at home weekly and prn symptoms for goal BP <130/80.  Avoid Price's table salt; use Mrs. Painter or Davion Greer no salt   -Start healthy diet high in fiber (25-35 gm daily), low fat dairy, lean protein, low in saturated/trans fat (minimize cheese, creamy salad dressings); high in calcium/magnesium/potassium; 1.5 gm sodium diet; low in processed sugars and foods, ie  WHITE sugars, bread, pasta, rice, ice cream, cookies, cake, doughnuts  -Drink 6-8 glasses of water daily  -Moderate exercise 30 min 5 times per week or 75 min intense exercise biweekly   -Start 8-10 hour intermittent fasting diet   -Avoid eating 3 hours prior to bedtime  -Reduce alcohol to 1-2 servings (1 serving = 1 oz wine, 1 oz hard liquor, 12 oz beer) per day  -Avoid smoking, vaping, stimulant drugs/mediations ie illicit drugs, pseudo-ephedrine  -Use ADD/ADHD meds sparingly  -Minimize NSAIDs         4. Achilles tendonitis, bilateral  Assessment & Plan:  F/u podiatry Dr. Palencia in Feb 3, 2025  Apply Lidoderm 5% patch daily - apply ice qid x 20 min and can alternate with heat  Apply Voltaren 1% gel qid   Cont Tylenol ES bid and then add Mobic prn severe pain        5. Myalgia due to statin  Assessment & Plan:  Tolerating Livalo       6. Chronic left shoulder pain  Overview:  MRI left shoulder 1/16/2025::   Calcific tendinitis of the infraspinatus.  2. Fraying of the superior to  posterior labrum.  3. Subacromial subdeltoid bursitis.       Assessment & Plan:  Keep ortho appt   Cont lidoderm 5% patches and tylenol 1 gm po bid  Refer to PT for eval and tx  Avoid painful left shoulder motions      7. Class 1 obesity due to excess calories with serious comorbidity and body mass index (BMI) of 30.0 to 30.9 in adult  Assessment & Plan:  Pt encouraged to stop eating simple sugars ie cookies and candy danica in the middle of the night  Drink 6-8 glasses of water daily  Exercise 30 min 5 times per week, decrease portion sizes by 50%; encourage plant-based diet;  drink 6-8 glasses of water daily, avoid fast foods, creamy, rich foods danica ice cream, cheese creamy salad dressings;avoid eating 3 hours prior to bedtime; consider 8-10 hour intermittent diet; avoid simple sugars danica white bread, rice, potatoes, noodles/pasta; no sweetened drinks.    Reduce alcohol to max 1-2 drinks per day (1 drink = 12 beer;  1 oz hard liquor; 5 oz wine)    Limit eating at restaurants to once per week; avoid fast foods, junk foods, impulsive eating. Drink 1 glass of lukewarm water before eating. Chew 100 times before swallowing food.     Use the Lose It Erick to track calories and aim for eating less than 1200 calories per day.        8. Allergy to ACE inhibitors  Assessment & Plan:  Tolerating Losartan without angioedema or cough                 I spent a total of 37  minutes on the day of the visit.This includes face to face time and non-face to face time preparing to see the patient (eg, review of tests), obtaining and/or reviewing separately obtained history, documenting clinical information in the electronic or other health record, independently interpreting results and communicating results to the patient/family/caregiver, or care coordinator.   Code Status:     Tatianna Paulino MD History of Present Illness

## 2025-02-03 ENCOUNTER — OFFICE VISIT (OUTPATIENT)
Dept: SPORTS MEDICINE | Facility: CLINIC | Age: 60
End: 2025-02-03
Payer: MEDICARE

## 2025-02-03 VITALS
BODY MASS INDEX: 33.81 KG/M2 | SYSTOLIC BLOOD PRESSURE: 142 MMHG | DIASTOLIC BLOOD PRESSURE: 81 MMHG | HEART RATE: 67 BPM | WEIGHT: 198.06 LBS | HEIGHT: 64 IN

## 2025-02-03 DIAGNOSIS — M75.52 CHRONIC SHOULDER BURSITIS, LEFT: Primary | ICD-10-CM

## 2025-02-03 PROCEDURE — 3052F HG A1C>EQUAL 8.0%<EQUAL 9.0%: CPT | Mod: CPTII,S$GLB,, | Performed by: ORTHOPAEDIC SURGERY

## 2025-02-03 PROCEDURE — 99999 PR PBB SHADOW E&M-EST. PATIENT-LVL III: CPT | Mod: PBBFAC,,, | Performed by: ORTHOPAEDIC SURGERY

## 2025-02-03 PROCEDURE — 3008F BODY MASS INDEX DOCD: CPT | Mod: CPTII,S$GLB,, | Performed by: ORTHOPAEDIC SURGERY

## 2025-02-03 PROCEDURE — 3066F NEPHROPATHY DOC TX: CPT | Mod: CPTII,S$GLB,, | Performed by: ORTHOPAEDIC SURGERY

## 2025-02-03 PROCEDURE — 20610 DRAIN/INJ JOINT/BURSA W/O US: CPT | Mod: LT,S$GLB,, | Performed by: ORTHOPAEDIC SURGERY

## 2025-02-03 PROCEDURE — 3061F NEG MICROALBUMINURIA REV: CPT | Mod: CPTII,S$GLB,, | Performed by: ORTHOPAEDIC SURGERY

## 2025-02-03 PROCEDURE — 99214 OFFICE O/P EST MOD 30 MIN: CPT | Mod: 25,S$GLB,, | Performed by: ORTHOPAEDIC SURGERY

## 2025-02-03 PROCEDURE — 4010F ACE/ARB THERAPY RXD/TAKEN: CPT | Mod: CPTII,S$GLB,, | Performed by: ORTHOPAEDIC SURGERY

## 2025-02-03 PROCEDURE — 3077F SYST BP >= 140 MM HG: CPT | Mod: CPTII,S$GLB,, | Performed by: ORTHOPAEDIC SURGERY

## 2025-02-03 PROCEDURE — 3079F DIAST BP 80-89 MM HG: CPT | Mod: CPTII,S$GLB,, | Performed by: ORTHOPAEDIC SURGERY

## 2025-02-03 RX ADMIN — TRIAMCINOLONE ACETONIDE 80 MG: 40 INJECTION, SUSPENSION INTRA-ARTICULAR; INTRAMUSCULAR at 03:02

## 2025-02-03 NOTE — LETTER
Patient: Aisha Muñoz   YOB: 1965   Clinic Number: [id number]   Today's Date: February 3, 2025        Certificate to Return to Work     Aisha  was seen by Jacquie Erickson MD on 2/3/2025.      Aisha  can return to work on 02/04/2025       If you have any questions or concerns, please feel free to contact the office at [phone].    Thank you.    Jacquie Erickson MD

## 2025-02-14 ENCOUNTER — PATIENT MESSAGE (OUTPATIENT)
Dept: PODIATRY | Facility: CLINIC | Age: 60
End: 2025-02-14
Payer: MEDICARE

## 2025-02-18 ENCOUNTER — PATIENT MESSAGE (OUTPATIENT)
Dept: PODIATRY | Facility: CLINIC | Age: 60
End: 2025-02-18
Payer: MEDICARE

## 2025-03-18 ENCOUNTER — PATIENT MESSAGE (OUTPATIENT)
Dept: PODIATRY | Facility: CLINIC | Age: 60
End: 2025-03-18
Payer: MEDICARE

## 2025-03-19 ENCOUNTER — OFFICE VISIT (OUTPATIENT)
Dept: PODIATRY | Facility: CLINIC | Age: 60
End: 2025-03-19
Payer: MEDICARE

## 2025-03-19 VITALS
BODY MASS INDEX: 34.25 KG/M2 | SYSTOLIC BLOOD PRESSURE: 146 MMHG | HEART RATE: 73 BPM | HEIGHT: 64 IN | WEIGHT: 200.63 LBS | DIASTOLIC BLOOD PRESSURE: 85 MMHG

## 2025-03-19 DIAGNOSIS — E11.65 POORLY CONTROLLED TYPE 2 DIABETES MELLITUS: ICD-10-CM

## 2025-03-19 PROCEDURE — 3079F DIAST BP 80-89 MM HG: CPT | Mod: CPTII,S$GLB,, | Performed by: PODIATRIST

## 2025-03-19 PROCEDURE — 3077F SYST BP >= 140 MM HG: CPT | Mod: CPTII,S$GLB,, | Performed by: PODIATRIST

## 2025-03-19 PROCEDURE — 99203 OFFICE O/P NEW LOW 30 MIN: CPT | Mod: S$GLB,,, | Performed by: PODIATRIST

## 2025-03-19 PROCEDURE — 99999 PR PBB SHADOW E&M-EST. PATIENT-LVL IV: CPT | Mod: PBBFAC,,, | Performed by: PODIATRIST

## 2025-03-19 PROCEDURE — 1159F MED LIST DOCD IN RCRD: CPT | Mod: CPTII,S$GLB,, | Performed by: PODIATRIST

## 2025-03-19 PROCEDURE — 3008F BODY MASS INDEX DOCD: CPT | Mod: CPTII,S$GLB,, | Performed by: PODIATRIST

## 2025-03-19 PROCEDURE — 3051F HG A1C>EQUAL 7.0%<8.0%: CPT | Mod: CPTII,S$GLB,, | Performed by: PODIATRIST

## 2025-03-19 NOTE — LETTER
March 19, 2025      [id number]  [address]  [facility] [address]  Phone: [phone]       Patient: Aisha Muñoz   YOB: 1965  Date of Visit: 03/19/2025    To Whom It May Concern:    Shelia Muñoz  was at [facility] on 03/19/2025. She may return to work on 3/19/2025 with no restrictions. If you have any questions or concerns, or if I can be of further assistance, please do not hesitate to contact me.    Sincerely,    Malachi Zacarias DPM

## 2025-03-19 NOTE — PROGRESS NOTES
Subjective:      Patient ID: Aisha Muñoz is a 59 y.o. female.    Chief Complaint: Diabetic Foot Exam (1/23/25 - Tatianna Paulino MD, PCP) and Foot Swelling (B/L)    Aisha is a 59 y.o. female who presents to the clinic upon referral from Dr. Paulino  for evaluation and treatment of diabetic feet. Aisha has a past medical history of Acute midline low back pain with right-sided sciatica (06/07/2023), Acute viral syndrome (12/07/2023), Arthritis, Brain tumor (benign) (1997), Chronic back pain, Chronic shoulder bursitis, left (09/17/2024), Diabetes mellitus, Diabetic gastroparesis associated with type 2 diabetes mellitus (08/25/2023), Gastroesophageal reflux disease without esophagitis (08/25/2023), Genetic testing (06/2024), Hypertension, and Pain (07/19/2024). Patient relates no major problem with feet. Only complaints today consist of diabetic foot exam.    PCP: Tatianna Paulino MD    Date Last Seen by PCP:   Chief Complaint   Patient presents with    Diabetic Foot Exam     1/23/25 - Tatianna Paulino MD, PCP    Foot Swelling     B/L         Current shoe gear: Casual shoes    Hemoglobin A1C   Date Value Ref Range Status   01/10/2025 7.7 (H) 4.0 - 5.6 % Final     Comment:     ADA Screening Guidelines:  5.7-6.4%  Consistent with prediabetes  >or=6.5%  Consistent with diabetes    High levels of fetal hemoglobin interfere with the HbA1C  assay. Heterozygous hemoglobin variants (HbS, HgC, etc)do  not significantly interfere with this assay.   However, presence of multiple variants may affect accuracy.     08/16/2024 8.8 (H) 4.0 - 5.6 % Final     Comment:     ADA Screening Guidelines:  5.7-6.4%  Consistent with prediabetes  >or=6.5%  Consistent with diabetes    High levels of fetal hemoglobin interfere with the HbA1C  assay. Heterozygous hemoglobin variants (HbS, HgC, etc)do  not significantly interfere with this assay.   However, presence of multiple variants may affect accuracy.     04/01/2024 8.6 (H) 4.0 - 5.6 % Final      Comment:     ADA Screening Guidelines:  5.7-6.4%  Consistent with prediabetes  >or=6.5%  Consistent with diabetes    High levels of fetal hemoglobin interfere with the HbA1C  assay. Heterozygous hemoglobin variants (HbS, HgC, etc)do  not significantly interfere with this assay.   However, presence of multiple variants may affect accuracy.           Review of Systems   Constitutional: Negative for chills, fever and malaise/fatigue.   HENT:  Negative for hearing loss.    Cardiovascular:  Negative for claudication.   Respiratory:  Negative for shortness of breath.    Skin:  Negative for flushing, nail changes and rash.   Musculoskeletal:  Negative for joint pain and myalgias.   Neurological:  Negative for loss of balance, numbness, paresthesias and sensory change.   Psychiatric/Behavioral:  Negative for altered mental status.          Objective:      Physical Exam  Vitals reviewed.   Cardiovascular:      Pulses:           Dorsalis pedis pulses are 2+ on the right side and 2+ on the left side.        Posterior tibial pulses are 2+ on the right side and 2+ on the left side.      Comments: No edema noted to b/L LEs  Musculoskeletal:      Comments: Adequate joint ROM noted to all lower extremity muscle groups with no pain or crepitation noted. Muscle strength is 5/5 in all groups bilaterally.     Feet:      Right foot:      Protective Sensation: 5 sites tested.  5 sites sensed.      Left foot:      Protective Sensation: 5 sites tested.  5 sites sensed.   Skin:     General: Skin is warm.      Capillary Refill: Capillary refill takes 2 to 3 seconds.      Comments: Normal skin tugor noted.   No open lesion noted b/L  Skin temp is warm to warm from proximal to distal b/L.  Webspaces clean, dry, and intact  Nails x10 short   Neurological:      Mental Status: She is alert and oriented to person, place, and time.      Comments: Intact gross sensation noted to b/L LEs           Assessment:       Encounter Diagnosis   Name  Primary?    Poorly controlled type 2 diabetes mellitus          Plan:       Aisha was seen today for diabetic foot exam and foot swelling.    Diagnoses and all orders for this visit:    Poorly controlled type 2 diabetes mellitus  -     Ambulatory referral/consult to Podiatry      I counseled the patient on her conditions, their implications and medical management.    Discussed DM foot care:  Wear comfortable, proper fitting shoes. Wash feet daily. Dry well. After drying, apply moisturizer to feet (no lotion to webspaces). Inspect feet daily for skin breaks, blisters, swelling, or redness. Wear cotton socks (preferably white)  Change socks every day. Do NOT walk barefoot. Do NOT use heating pads or warm/hot water soaks      - Discussed importance of daily moisturizer to the feet such as Gold bonds diabetic foot cream     - Patient is low risk for developing lower extremity issues secondary to diabetes     - Advised the patient that fungus likes warm, dark, and moist environments such as bathrooms, gyms, and pools. Advised to spray shower, shower mat , and any shoes w/ Lysol periodically  RTC in 1 year or sooner if any new pedal problems should arise.      .

## 2025-03-24 DIAGNOSIS — Z00.00 ENCOUNTER FOR MEDICARE ANNUAL WELLNESS EXAM: ICD-10-CM

## 2025-04-10 NOTE — PROGRESS NOTES
[facility]/Pediatrics Progress Note      Audiovisual Visit  Location:  [place], [place]      Chief Complaint     Diabetes and Follow-up    Subjective:      History of Present Illness:  Aisha Muñoz is a 59 y.o. female     Bilateral Achilles Tendonitis as per podiatry, Dr. Zacarias, Podiatry 3/19/2025:   -pt cont to have severe pain but now more proximal  -takes Tylenol and oxycodone and gabapentin 100mg qhs but is not taking Mobic   -does not consistently use lidoderm 5% patches   Has appt 2/03 with Dr. Palencia     HLD: currently taking livalo without myalgias    Chronic left shoulder pain x 12 mo  MRI left shoulder 1/16/2025::   Calcific tendinitis of the infraspinatus.  2. Fraying of the superior to posterior labrum.  3. Subacromial subdeltoid bursitis.  -responded to shoulder injection by Ortho 2/2025 by Dr. Erickson   -uses lidoderm 5% patch and tylenol for pain relief   -Shoulder ortho called to schedule appt for possible injection on 1/21 but had to reschedule due to weather since cervical C7-T1 DAVID in 12/2024 not effective to improve left shoulder pain  -pt is interested in PT   -pt sleeps on left side; uses 3-4 lido 5% patches; cont pain medication     HTN: now on Losartan 25mg daily since allergic to lisinopril with excellent BP control     Obesity: pt admits to awakening in the middle of the night and eating candy and cookies and therefore is unable to lose weight.     FH: pancreatic cancer (mom and maternal 1st cousin once removed): referred go genetics with labs drawn 5/2024 with variant of undetermined significance     Poorly-controlled DM:  HbA1C 7.7 1/2025; needs Actos 45mg daily; tolerating mounjaro 12.5mg SQ weekly since 1/10/2025 but causes constipation so takes Mylanta and 2 dulcolax monthly; eating baked potatoes, adds sugar 5 tsp in his cofffee, sweetened iced tea; drinks a lot of water  -trying to eat healthier foods ie avoiding fried foods    Mixed HLD: did not tolerate statins due  to cramps;  never got Livalo   HLD: LDL 70, HDL 65, TG 50 on no lipid meds    Vit D def'y: level 10;  taking Vit D 50,000 IU weekly     Tobacco: stopped 1ppd x 14 years; needs LDCT now *smoked x 18 years     Low Mag: level 1.4 not taking Mag oxide bid     Chronic bilateral LBP due to spondylosis with sciatica: gets epidural injections by Dr. Villeda prn approx every 3 mo i last seen 2024   -has hx L5-S1 anterior instrumented fusion     SH: works at [employer]   SH: no drinking, drugs, alcohol; 9 children; works at [employer] as a    FH: pancreatic cancer -mom, MGM, maternal aunt all  from it 56 y/o, 50s from pancreatic cancer .     Past Medical History:  Past Medical History:   Diagnosis Date    Acute midline low back pain with right-sided sciatica 2023    Acute viral syndrome 2023    Arthritis     Brain tumor (benign)     Chronic back pain     Chronic shoulder bursitis, left 2024    Diabetes mellitus     Diabetic gastroparesis associated with type 2 diabetes mellitus 2023    Gastroesophageal reflux disease without esophagitis 2023    Genetic testing 2024    MSH6 variant of uncertain significance, see Ambry results in Media.    Hypertension     Left genital labial abscess 2025    Pain 2024       Past Surgical History:  Past Surgical History:   Procedure Laterality Date    BACK SURGERY      no hardware  fusion    BRAIN SURGERY      benign tumor removed from brain    CARPAL TUNNEL RELEASE Bilateral     COLONOSCOPY N/A 2024    Procedure: COLONOSCOPY;  Surgeon: Alden Luna MD;  Location: [address]);  Service: Endoscopy;  Laterality: N/A;  Ref By Dr. Hudson  Procedure: Colonoscopy  Diagnosis: Screening colonoscopy  procedure timein-12 weeks  Prep: Suflav   Diabetes  24-Precall complete, instr portal, confirmed new arrrival time of 745am and pt holding Mounjaro-DS  24- per message from Eula CROWLEY  STEROID INJECTION INTO CERVICAL SPINE N/A 2024    Procedure: DAVID C7-T1;  Surgeon: Eduar Neri MD;  Location: [facility];  Service: Pain Management;  Laterality: N/A;  no ac-not taking Tirzepatide any longer    HYSTERECTOMY      ovaries in situ       Allergies:  Review of patient's allergies indicates:   Allergen Reactions    Fish containing products Shortness Of Breath, Swelling and Rash    Lisinopril Other (See Comments)     Pt has dry cough continuously    Grape Itching    Strawberries [strawberry] Itching    Banana Itching    Lyrica [pregabalin] Other (See Comments)     headache    Pineapple Hives and Itching       Home Medications:  Current Outpatient Medications   Medication Sig Dispense Refill    acetaminophen (TYLENOL) 325 MG tablet Take 325 mg by mouth as needed for Pain.      diclofenac sodium (VOLTAREN) 1 % Gel 4 grams to effected area 4 times daily do not exceed 32 grams 1 each 0    EPINEPHrine (EPIPEN) 0.3 mg/0.3 mL AtIn Inject into the muscle.      ergocalciferol (ERGOCALCIFEROL) 50,000 unit Cap Take 1 capsule (50,000 Units total) by mouth every 7 days. 12 capsule 3    fluticasone propionate (FLONASE) 50 mcg/actuation nasal spray 1 spray (50 mcg total) by Each Nostril route 2 (two) times daily as needed for Rhinitis. 16 g 3    LIDOcaine (LIDODERM) 5 % SMARTSI-2 Patch(s) Topical Daily PRN      losartan (COZAAR) 25 MG tablet Take 1 tablet (25 mg total) by mouth once daily. 90 tablet 3    meloxicam (MOBIC) 15 MG tablet Take 1 tablet (15 mg total) by mouth once daily. 14 tablet 0    ondansetron (ZOFRAN-ODT) 4 MG TbDL Take 1 tablet (4 mg total) by mouth every 8 (eight) hours as needed (nausea). 90 tablet 3    oxyCODONE-acetaminophen (PERCOCET)  mg per tablet Take 1 tablet by mouth 3 (three) times daily as needed. 90 tablet 0    pantoprazole (PROTONIX) 40 MG tablet Take 1 tablet (40 mg total) by mouth once daily. 90 tablet 3    pioglitazone (ACTOS) 45 MG tablet Take 1 tablet (45 mg  total) by mouth once daily. 90 tablet 3    tirzepatide (MOUNJARO) 12.5 mg/0.5 mL PnIj Inject 12.5 mg into the skin every 7 days. 0.5 mL 3    tirzepatide 10 mg/0.5 mL PnIj Inject 10 mg into the skin every 7 days. 0.5 mL 3    tiZANidine (ZANAFLEX) 4 MG tablet Take 1 tablet (4 mg total) by mouth nightly as needed (muscle tension). 60 tablet 0    albuterol (VENTOLIN HFA) 90 mcg/actuation inhaler Inhale 2 puffs into the lungs every 4 (four) hours as needed for Wheezing. Rescue 18 g 1    gabapentin (NEURONTIN) 100 MG capsule Take 1-3 capsules (100-300 mg total) by mouth every evening. 90 capsule 1    magnesium glycinate 100 mg magnesium Cap Take 500 mg by mouth nightly. 90 capsule 3    pitavastatin calcium (LIVALO) 4 mg Tab Take 4 mg by mouth once daily. 90 tablet 3     No current facility-administered medications for this visit.        Family History:  Family History   Problem Relation Name Age of Onset    Cirrhosis Mother Constantine     Leukemia Mother Constantine     Cirrhosis Maternal Grandmother      Cirrhosis Other Lyla 30 - 39    Pancreatic cancer Other Lyla 60 - 69    Lupus Daughter      Breast cancer Other      Colon cancer Neg Hx      Ovarian cancer Neg Hx         Social History:  Social History     Tobacco Use    Smoking status: Former     Current packs/day: 0.00     Average packs/day: 0.5 packs/day for 1 year (0.5 ttl pk-yrs)     Types: Cigarettes     Start date: 2012     Quit date: 2013     Years since quittin.4    Smokeless tobacco: Never   Substance Use Topics    Alcohol use: Yes     Comment: occasionally    Drug use: No       Review of Systems:  Pertinent positives and negatives listed in HPI. All other systems are reviewed and are negative.    Health Maintaince :   Health Maintenance Topics with due status: Not Due       Topic Last Completion Date    TETANUS VACCINE 2019    Colorectal Cancer Screening 2024    Diabetes Urine Screening 2024    Diabetic Eye Exam 2024     "Mammogram 10/18/2024    Lipid Panel 01/10/2025    Hemoglobin A1c 01/10/2025    Foot Exam 03/19/2025    RSV Vaccine (Age 60+ and Pregnant patients) Not Due           Eye: this Friday at [facility] [place]  - eye exam 8/2025  Needs foot referral   Dental: all pulled out in March 2023     IHepatitis C:  Cancer Screening:  refuses flu and COVID   PAP: UTD  Mammogram: UTD 10/2024   Colonoscopy: UTD5/24/2024  DEXA:   LDCT: 10/2024 WNL    The 10-year ASCVD risk score (Sara BROWN, et al., 2019) is: 9.6%    Values used to calculate the score:      Age: 59 years      Sex: Female      Is Non- : Yes      Diabetic: Yes      Tobacco smoker: No      Systolic Blood Pressure: 124 mmHg      Is BP treated: Yes      HDL Cholesterol: 57 mg/dL      Total Cholesterol: 140 mg/dL      Objective:   /70 (BP Location: Right arm, Patient Position: Sitting)   Pulse 74   Resp 14   Ht 5' 4" (1.626 m)   Wt 90.1 kg (198 lb 10.2 oz)   SpO2 99%   BMI 34.10 kg/m²      Body mass index is 34.1 kg/m².       Physical Examination:  General: Alert and awake in no apparent distress  Neck:   Cervical nodes not enlarged (No submental, submandibular, preauricular, posterior auricular or occipital adenopathy); supple; no bruits  Cardio:  Regular rate and rhythm with normal S1 and S2; no murmurs or rubs  Resp:  CTAB; respirations unlabored; no wheezes, crackles or rhonchi  Shoulders:      left shoulder with limited external rotation and tender left anterior shoulder   Neuro:  AAOx3; cooperative and pleasant with no focal deficits    Laboratory:      Most Recent Data:  Lab Results   Component Value Date    WBC 5.80 01/10/2025    HGB 13.1 01/10/2025    HCT 40.3 01/10/2025     01/10/2025    CHOL 140 01/10/2025    TRIG 50 01/10/2025    HDL 57 01/10/2025    ALT 14 01/10/2025    AST 13 01/10/2025     01/10/2025    K 4.1 01/10/2025     01/10/2025    BUN 12 01/10/2025    CO2 23 01/10/2025    TSH 2.7 " 08/24/2006    HGBA1C 7.7 (H) 01/10/2025              CBC:   WBC   Date Value Ref Range Status   01/10/2025 5.80 3.90 - 12.70 K/uL Final     Hemoglobin   Date Value Ref Range Status   01/10/2025 13.1 12.0 - 16.0 g/dL Final     Hematocrit   Date Value Ref Range Status   01/10/2025 40.3 37.0 - 48.5 % Final     Platelets   Date Value Ref Range Status   01/10/2025 299 150 - 450 K/uL Final     MCV   Date Value Ref Range Status   01/10/2025 95 82 - 98 fL Final     RDW   Date Value Ref Range Status   01/10/2025 13.6 11.5 - 14.5 % Final     BMP:   Sodium   Date Value Ref Range Status   01/10/2025 140 136 - 145 mmol/L Final     Potassium   Date Value Ref Range Status   01/10/2025 4.1 3.5 - 5.1 mmol/L Final     Chloride   Date Value Ref Range Status   01/10/2025 109 95 - 110 mmol/L Final     CO2   Date Value Ref Range Status   01/10/2025 23 23 - 29 mmol/L Final     BUN   Date Value Ref Range Status   01/10/2025 12 6 - 20 mg/dL Final     Creatinine   Date Value Ref Range Status   01/10/2025 0.8 0.5 - 1.4 mg/dL Final     Glucose   Date Value Ref Range Status   01/10/2025 161 (H) 70 - 110 mg/dL Final     Calcium   Date Value Ref Range Status   01/10/2025 9.3 8.7 - 10.5 mg/dL Final     Magnesium   Date Value Ref Range Status   01/10/2025 1.4 (L) 1.6 - 2.6 mg/dL Final     LFTs:   Total Protein   Date Value Ref Range Status   01/10/2025 6.9 6.0 - 8.4 g/dL Final     Albumin   Date Value Ref Range Status   01/10/2025 3.5 3.5 - 5.2 g/dL Final     Total Bilirubin   Date Value Ref Range Status   01/10/2025 0.8 0.1 - 1.0 mg/dL Final     Comment:     For infants and newborns, interpretation of results should be based  on gestational age, weight and in agreement with clinical  observations.    Premature Infant recommended reference ranges:  Up to 24 hours.............<8.0 mg/dL  Up to 48 hours............<12.0 mg/dL  3-5 days..................<15.0 mg/dL  6-29 days.................<15.0 mg/dL       AST   Date Value Ref Range Status  "  01/10/2025 13 10 - 40 U/L Final     Alkaline Phosphatase   Date Value Ref Range Status   01/10/2025 70 40 - 150 U/L Final     ALT   Date Value Ref Range Status   01/10/2025 14 10 - 44 U/L Final     Coags: No results found for: "INR", "PROTIME", "PTT"  FLP:      Lab Results   Component Value Date    CHOL 140 01/10/2025    CHOL 145 08/16/2024    CHOL 159 08/25/2023     Lab Results   Component Value Date    HDL 57 01/10/2025    HDL 65 08/16/2024    HDL 75 08/25/2023     Lab Results   Component Value Date    LDLCALC 73.0 01/10/2025    LDLCALC 70.0 08/16/2024    LDLCALC 75.0 08/25/2023     Lab Results   Component Value Date    TRIG 50 01/10/2025    TRIG 50 08/16/2024    TRIG 45 08/25/2023     Lab Results   Component Value Date    CHOLHDL 40.7 01/10/2025    CHOLHDL 44.8 08/16/2024    CHOLHDL 47.2 08/25/2023      DM:      Hemoglobin A1C   Date Value Ref Range Status   01/10/2025 7.7 (H) 4.0 - 5.6 % Final     Comment:     ADA Screening Guidelines:  5.7-6.4%  Consistent with prediabetes  >or=6.5%  Consistent with diabetes    High levels of fetal hemoglobin interfere with the HbA1C  assay. Heterozygous hemoglobin variants (HbS, HgC, etc)do  not significantly interfere with this assay.   However, presence of multiple variants may affect accuracy.     08/16/2024 8.8 (H) 4.0 - 5.6 % Final     Comment:     ADA Screening Guidelines:  5.7-6.4%  Consistent with prediabetes  >or=6.5%  Consistent with diabetes    High levels of fetal hemoglobin interfere with the HbA1C  assay. Heterozygous hemoglobin variants (HbS, HgC, etc)do  not significantly interfere with this assay.   However, presence of multiple variants may affect accuracy.     04/01/2024 8.6 (H) 4.0 - 5.6 % Final     Comment:     ADA Screening Guidelines:  5.7-6.4%  Consistent with prediabetes  >or=6.5%  Consistent with diabetes    High levels of fetal hemoglobin interfere with the HbA1C  assay. Heterozygous hemoglobin variants (HbS, HgC, etc)do  not significantly " "interfere with this assay.   However, presence of multiple variants may affect accuracy.       LDL Cholesterol   Date Value Ref Range Status   01/10/2025 73.0 63.0 - 159.0 mg/dL Final     Comment:     The National Cholesterol Education Program (NCEP) has set the  following guidelines (reference values) for LDL Cholesterol:  Optimal.......................<130 mg/dL  Borderline High...............130-159 mg/dL  High..........................160-189 mg/dL  Very High.....................>190 mg/dL       Creatinine   Date Value Ref Range Status   01/10/2025 0.8 0.5 - 1.4 mg/dL Final     Thyroid:   TSH   Date Value Ref Range Status   08/24/2006 2.7 0.4 - 4.0 uIU/ml Final     Anemia: No results found for: "IRON", "TIBC", "FERRITIN", "UZWCHNEI17", "FOLATE"  Cardiac:   Troponin I   Date Value Ref Range Status   05/26/2017 <0.006 0.000 - 0.026 ng/mL Final     Comment:     The reference interval for Troponin I represents the 99th percentile   cutoff   for our facility and is consistent with 3rd generation assay   performance.       BNP   Date Value Ref Range Status   05/26/2017 <10 0 - 99 pg/mL Final     Comment:     Values of less than 100 pg/ml are consistent with non-CHF populations.     Urinalysis:   Urine Culture, Routine   Date Value Ref Range Status   04/23/2007   Final    >100,000 ORGANISMS/ML -ESCHERICHIA COLI  Amikacin             <=16       SENSITIVE     Amox/K Clav          <=8/4      SENSITIVE     Ceftriaxone          <=8        SENSITIVE     Cefazolin            <=8        SENSITIVE     Ciprofloxacin        <=1        SENSITIVE     Nitrofurantoin       <=32       SENSITIVE     Gentamicin           <=4        SENSITIVE     Bactrim              <=2/38     SENSITIVE     Tetracycline         <=4        SENSITIVE     Timentin             <=16       SENSITIVE     Tobramycin           <=4        SENSITIVE          Color, UA   Date Value Ref Range Status   08/16/2024 Yellow Yellow, Straw, Nelly Final     Specific " Gravity, UA   Date Value Ref Range Status   08/16/2024 1.020 1.005 - 1.030 Final     Nitrite, UA   Date Value Ref Range Status   08/16/2024 Positive (A) Negative Final     Ketones, UA   Date Value Ref Range Status   08/16/2024 Negative Negative Final     Urobilinogen, UA   Date Value Ref Range Status   10/23/2017 1.0 <2.0 EU/dL Final     WBC, UA   Date Value Ref Range Status   08/16/2024 3 0 - 5 /hpf Final       Other Results:  EKG (my interpretation):     Radiology:  MRI Shoulder Without Contrast Left  Narrative: EXAMINATION:  MRI SHOULDER WITHOUT CONTRAST LEFT    CLINICAL HISTORY:  Shoulder pain, rotator cuff disorder suspected, xray done;  Pain in left shoulder    TECHNIQUE:  MRI left shoulder performed without contrast per routine protocol.    COMPARISON:  Radiographs 10/18/2024    FINDINGS:  Rotator cuff: There is focal signal void at the footprint of the infraspinatus tendon with associated intra tendinous edema consistent with calcific tendinitis.  No evidence for rotator cuff tendon tear.  Supraspinatus, subscapularis, and teres minor tendons are unremarkable.  Muscle bulk is maintained.    Labrum: Fraying of the superior to posterior labrum.    Biceps: Long head biceps tendon is intact.    Bone: No fracture or marrow infiltrative process.    Acromioclavicular joint: Moderate arthrosis with marrow edema.    Cartilage: Articular cartilage of the glenohumeral joint is preserved.    Miscellaneous: Mild thickening of subacromial subdeltoid bursa.  Impression: 1. Calcific tendinitis of the infraspinatus.  2. Fraying of the superior to posterior labrum.  3. Subacromial subdeltoid bursitis.    Electronically signed by: Osmel Saini MD  Date:    01/16/2025  Time:    09:51          Assessment/Plan     Aisha Muñoz is a 59 y.o. female with:  1. Diabetes mellitus type 2 in obese  -     Hemoglobin A1C; Future; Expected date: 04/11/2025  -     Basic Metabolic Panel; Future; Expected date: 04/11/2025  -      Urinalysis; Future; Expected date: 04/11/2025  -     Microalbumin/Creatinine Ratio, Urine; Future; Expected date: 04/11/2025    2. Hyperlipidemia associated with type 2 diabetes mellitus  Assessment & Plan:  Start livalo 4mg daily - check FLP with next labs    Orders:  -     pitavastatin calcium (LIVALO) 4 mg Tab; Take 4 mg by mouth once daily.  Dispense: 90 tablet; Refill: 3    3. Weight gain  -     TSH; Future; Expected date: 04/11/2025    4. Low magnesium level  Assessment & Plan:  Start mag glycinate 500mg qhs   Check level with next lab    Orders:  -     magnesium glycinate 100 mg magnesium Cap; Take 500 mg by mouth nightly.  Dispense: 90 capsule; Refill: 3    5. B12 deficiency  -     Vitamin B12; Future; Expected date: 04/11/2025    6. Class 1 obesity due to excess calories with serious comorbidity and body mass index (BMI) of 30.0 to 30.9 in adult  Assessment & Plan:  Cont Mounjaro 12.5mg SQ weekly  Pt encouraged to stop eating simple sugars ie cookies and candy danica in the middle of the night  Drink 6-8 glasses of water daily  Exercise 30 min 5 times per week, decrease portion sizes by 50%; encourage plant-based diet;  drink 6-8 glasses of water daily, avoid fast foods, creamy, rich foods danica ice cream, cheese creamy salad dressings;avoid eating 3 hours prior to bedtime; consider 8-10 hour intermittent diet; avoid simple sugars danica white bread, rice, potatoes, noodles/pasta; no sweetened drinks.    Reduce alcohol to max 1-2 drinks per day (1 drink = 12 beer;  1 oz hard liquor; 5 oz wine)    Limit eating at restaurants to once per week; avoid fast foods, junk foods, impulsive eating. Drink 1 glass of lukewarm water before eating. Chew 100 times before swallowing food.     Use the Lose It Erick to track calories and aim for eating less than 1200 calories per day.        7. Primary hypertension  Assessment & Plan:  Well-controlled on Cozaar 25mg po daily   -Check Bps at home weekly and prn symptoms for goal BP  <130/80.  Avoid Price's table salt; use Mrs. Painter or Davion Greer no salt   -Start healthy diet high in fiber (25-35 gm daily), low fat dairy, lean protein, low in saturated/trans fat (minimize cheese, creamy salad dressings); high in calcium/magnesium/potassium; 1.5 gm sodium diet; low in processed sugars and foods, ie  WHITE sugars, bread, pasta, rice, ice cream, cookies, cake, doughnuts  -Drink 6-8 glasses of water daily  -Moderate exercise 30 min 5 times per week or 75 min intense exercise biweekly   -Start 8-10 hour intermittent fasting diet   -Avoid eating 3 hours prior to bedtime  -Reduce alcohol to 1-2 servings (1 serving = 1 oz wine, 1 oz hard liquor, 12 oz beer) per day  -Avoid smoking, vaping, stimulant drugs/mediations ie illicit drugs, pseudo-ephedrine  -Use ADD/ADHD meds sparingly  -Minimize NSAIDs         8. Vitamin D deficiency  Assessment & Plan:  Take Vit D 50,000 IU weekly        9. Chronic left shoulder pain  Overview:  MRI left shoulder 1/16/2025::   Calcific tendinitis of the infraspinatus.  2. Fraying of the superior to posterior labrum.  3. Subacromial subdeltoid bursitis.       Assessment & Plan:  Cont lidoderm 5% patches and tylenol 1 gm po bid  Start aspercreme roll on gel qid  Avoid sleeping on left arm  Refer to PT for eval and tx  Avoid painful left shoulder motion  Toradol 60mg IM today  Refer back to ortho, Dr. Erickson       10. Preventative health care  Assessment & Plan:  Labs today                    I spent a total of 37  minutes on the day of the visit.This includes face to face time and non-face to face time preparing to see the patient (eg, review of tests), obtaining and/or reviewing separately obtained history, documenting clinical information in the electronic or other health record, independently interpreting results and communicating results to the patient/family/caregiver, or care coordinator.   Code Status:     Tatianna Paulino MD History of Present Illness

## 2025-04-11 ENCOUNTER — LAB VISIT (OUTPATIENT)
Dept: LAB | Facility: HOSPITAL | Age: 60
End: 2025-04-11
Attending: INTERNAL MEDICINE
Payer: MEDICARE

## 2025-04-11 ENCOUNTER — OFFICE VISIT (OUTPATIENT)
Dept: PRIMARY CARE CLINIC | Facility: CLINIC | Age: 60
End: 2025-04-11
Payer: MEDICARE

## 2025-04-11 VITALS
BODY MASS INDEX: 33.91 KG/M2 | RESPIRATION RATE: 14 BRPM | HEIGHT: 64 IN | OXYGEN SATURATION: 99 % | WEIGHT: 198.63 LBS | DIASTOLIC BLOOD PRESSURE: 70 MMHG | HEART RATE: 74 BPM | SYSTOLIC BLOOD PRESSURE: 124 MMHG

## 2025-04-11 DIAGNOSIS — M25.512 CHRONIC LEFT SHOULDER PAIN: ICD-10-CM

## 2025-04-11 DIAGNOSIS — G89.29 CHRONIC LEFT SHOULDER PAIN: ICD-10-CM

## 2025-04-11 DIAGNOSIS — E66.9 DIABETES MELLITUS TYPE 2 IN OBESE: ICD-10-CM

## 2025-04-11 DIAGNOSIS — E66.811 CLASS 1 OBESITY DUE TO EXCESS CALORIES WITH SERIOUS COMORBIDITY AND BODY MASS INDEX (BMI) OF 30.0 TO 30.9 IN ADULT: ICD-10-CM

## 2025-04-11 DIAGNOSIS — E78.5 HYPERLIPIDEMIA ASSOCIATED WITH TYPE 2 DIABETES MELLITUS: ICD-10-CM

## 2025-04-11 DIAGNOSIS — E55.9 VITAMIN D DEFICIENCY: ICD-10-CM

## 2025-04-11 DIAGNOSIS — R63.5 WEIGHT GAIN: ICD-10-CM

## 2025-04-11 DIAGNOSIS — E66.9 DIABETES MELLITUS TYPE 2 IN OBESE: Primary | ICD-10-CM

## 2025-04-11 DIAGNOSIS — I10 PRIMARY HYPERTENSION: Chronic | ICD-10-CM

## 2025-04-11 DIAGNOSIS — E53.8 B12 DEFICIENCY: ICD-10-CM

## 2025-04-11 DIAGNOSIS — R79.0 LOW MAGNESIUM LEVEL: ICD-10-CM

## 2025-04-11 DIAGNOSIS — E11.9 TYPE 2 DIABETES MELLITUS WITHOUT COMPLICATION: ICD-10-CM

## 2025-04-11 DIAGNOSIS — E11.69 HYPERLIPIDEMIA ASSOCIATED WITH TYPE 2 DIABETES MELLITUS: ICD-10-CM

## 2025-04-11 DIAGNOSIS — E11.69 DIABETES MELLITUS TYPE 2 IN OBESE: ICD-10-CM

## 2025-04-11 DIAGNOSIS — E11.69 DIABETES MELLITUS TYPE 2 IN OBESE: Primary | ICD-10-CM

## 2025-04-11 DIAGNOSIS — Z00.00 PREVENTATIVE HEALTH CARE: ICD-10-CM

## 2025-04-11 DIAGNOSIS — E66.09 CLASS 1 OBESITY DUE TO EXCESS CALORIES WITH SERIOUS COMORBIDITY AND BODY MASS INDEX (BMI) OF 30.0 TO 30.9 IN ADULT: ICD-10-CM

## 2025-04-11 PROBLEM — N76.4 LEFT GENITAL LABIAL ABSCESS: Status: RESOLVED | Noted: 2025-01-11 | Resolved: 2025-04-11

## 2025-04-11 LAB
ALBUMIN/CREAT UR: 9.3 UG/MG
ANION GAP (OHS): 8 MMOL/L (ref 8–16)
BACTERIA #/AREA URNS AUTO: ABNORMAL /HPF
BILIRUB UR QL STRIP.AUTO: NEGATIVE
BUN SERPL-MCNC: 19 MG/DL (ref 6–20)
CALCIUM SERPL-MCNC: 9.2 MG/DL (ref 8.7–10.5)
CHLORIDE SERPL-SCNC: 105 MMOL/L (ref 95–110)
CLARITY UR: CLEAR
CO2 SERPL-SCNC: 23 MMOL/L (ref 23–29)
COLOR UR AUTO: YELLOW
CREAT SERPL-MCNC: 0.9 MG/DL (ref 0.5–1.4)
CREAT UR-MCNC: 118 MG/DL (ref 15–325)
EAG (OHS): 192 MG/DL (ref 68–131)
GFR SERPLBLD CREATININE-BSD FMLA CKD-EPI: >60 ML/MIN/1.73/M2
GLUCOSE SERPL-MCNC: 128 MG/DL (ref 70–110)
GLUCOSE UR QL STRIP: NEGATIVE
HBA1C MFR BLD: 8.3 % (ref 4–5.6)
HGB UR QL STRIP: NEGATIVE
KETONES UR QL STRIP: NEGATIVE
LEUKOCYTE ESTERASE UR QL STRIP: NEGATIVE
MICROALBUMIN UR-MCNC: 11 UG/ML (ref ?–5000)
MICROSCOPIC COMMENT: ABNORMAL
NITRITE UR QL STRIP: POSITIVE
PH UR STRIP: 6 [PH]
POTASSIUM SERPL-SCNC: 4.1 MMOL/L (ref 3.5–5.1)
PROT UR QL STRIP: NEGATIVE
RBC #/AREA URNS AUTO: 1 /HPF (ref 0–4)
SODIUM SERPL-SCNC: 136 MMOL/L (ref 136–145)
SP GR UR STRIP: 1.02
SQUAMOUS #/AREA URNS AUTO: 3 /HPF
TSH SERPL-ACNC: 2.28 UIU/ML (ref 0.4–4)
UROBILINOGEN UR STRIP-ACNC: NEGATIVE EU/DL
VIT B12 SERPL-MCNC: <148 PG/ML (ref 210–950)
WBC #/AREA URNS AUTO: 1 /HPF (ref 0–5)

## 2025-04-11 PROCEDURE — 80048 BASIC METABOLIC PNL TOTAL CA: CPT

## 2025-04-11 PROCEDURE — 82043 UR ALBUMIN QUANTITATIVE: CPT

## 2025-04-11 PROCEDURE — 99999 PR PBB SHADOW E&M-EST. PATIENT-LVL IV: CPT | Mod: PBBFAC,,, | Performed by: INTERNAL MEDICINE

## 2025-04-11 PROCEDURE — 84443 ASSAY THYROID STIM HORMONE: CPT

## 2025-04-11 PROCEDURE — 36415 COLL VENOUS BLD VENIPUNCTURE: CPT | Mod: PN

## 2025-04-11 PROCEDURE — 83036 HEMOGLOBIN GLYCOSYLATED A1C: CPT

## 2025-04-11 PROCEDURE — 81003 URINALYSIS AUTO W/O SCOPE: CPT

## 2025-04-11 PROCEDURE — 82607 VITAMIN B-12: CPT

## 2025-04-11 RX ORDER — PITAVASTATIN CALCIUM 4.18 MG/1
4 TABLET, FILM COATED ORAL DAILY
Qty: 90 TABLET | Refills: 3 | Status: SHIPPED | OUTPATIENT
Start: 2025-04-11 | End: 2026-04-11

## 2025-04-11 RX ORDER — KETOROLAC TROMETHAMINE 30 MG/ML
60 INJECTION, SOLUTION INTRAMUSCULAR; INTRAVENOUS
Status: COMPLETED | OUTPATIENT
Start: 2025-04-11 | End: 2025-04-11

## 2025-04-11 RX ORDER — LANOLIN ALCOHOL/MO/W.PET/CERES
1000 CREAM (GRAM) TOPICAL DAILY
Qty: 90 TABLET | Refills: 3 | Status: SHIPPED | OUTPATIENT
Start: 2025-04-11

## 2025-04-11 RX ORDER — MAGNESIUM GLYCINATE 100 MG
500 CAPSULE ORAL NIGHTLY
Qty: 90 CAPSULE | Refills: 3 | Status: SHIPPED | OUTPATIENT
Start: 2025-04-11

## 2025-04-11 RX ADMIN — KETOROLAC TROMETHAMINE 60 MG: 30 INJECTION, SOLUTION INTRAMUSCULAR; INTRAVENOUS at 09:04

## 2025-04-11 NOTE — ASSESSMENT & PLAN NOTE
Cont lidoderm 5% patches and tylenol 1 gm po bid  Start aspercreme roll on gel qid  Avoid sleeping on left arm  Refer to PT for eval and tx  Avoid painful left shoulder motion  Toradol 60mg IM today  Refer back to ortho, Dr. Erickson

## 2025-04-11 NOTE — ASSESSMENT & PLAN NOTE
Cont Mounjaro 12.5mg SQ weekly  Pt encouraged to stop eating simple sugars ie cookies and candy danica in the middle of the night  Drink 6-8 glasses of water daily  Exercise 30 min 5 times per week, decrease portion sizes by 50%; encourage plant-based diet;  drink 6-8 glasses of water daily, avoid fast foods, creamy, rich foods danica ice cream, cheese creamy salad dressings;avoid eating 3 hours prior to bedtime; consider 8-10 hour intermittent diet; avoid simple sugars danica white bread, rice, potatoes, noodles/pasta; no sweetened drinks.    Reduce alcohol to max 1-2 drinks per day (1 drink = 12 beer;  1 oz hard liquor; 5 oz wine)    Limit eating at restaurants to once per week; avoid fast foods, junk foods, impulsive eating. Drink 1 glass of lukewarm water before eating. Chew 100 times before swallowing food.     Use the Lose It Erick to track calories and aim for eating less than 1200 calories per day.

## 2025-04-11 NOTE — PATIENT INSTRUCTIONS
Diabetes mellitus type 2 in obese  -     Hemoglobin A1C; Future; Expected date: 04/11/2025  -     Basic Metabolic Panel; Future; Expected date: 04/11/2025  -     Urinalysis; Future; Expected date: 04/11/2025  -     Microalbumin/Creatinine Ratio, Urine; Future; Expected date: 04/11/2025    Hyperlipidemia associated with type 2 diabetes mellitus  Assessment & Plan:  Start livalo 4mg daily - check FLP with next labs    Orders:  -     pitavastatin calcium (LIVALO) 4 mg Tab; Take 4 mg by mouth once daily.  Dispense: 90 tablet; Refill: 3    Weight gain  -     TSH; Future; Expected date: 04/11/2025    Low magnesium level  Assessment & Plan:  Start mag glycinate 500mg qhs   Check level with next lab    Orders:  -     magnesium glycinate 100 mg magnesium Cap; Take 500 mg by mouth nightly.  Dispense: 90 capsule; Refill: 3    B12 deficiency  -     Vitamin B12; Future; Expected date: 04/11/2025    Class 1 obesity due to excess calories with serious comorbidity and body mass index (BMI) of 30.0 to 30.9 in adult  Assessment & Plan:  Cont Mounjaro 12.5mg SQ weekly  Pt encouraged to stop eating simple sugars ie cookies and candy danica in the middle of the night  Drink 6-8 glasses of water daily  Exercise 30 min 5 times per week, decrease portion sizes by 50%; encourage plant-based diet;  drink 6-8 glasses of water daily, avoid fast foods, creamy, rich foods danica ice cream, cheese creamy salad dressings;avoid eating 3 hours prior to bedtime; consider 8-10 hour intermittent diet; avoid simple sugars danica white bread, rice, potatoes, noodles/pasta; no sweetened drinks.    Reduce alcohol to max 1-2 drinks per day (1 drink = 12 beer;  1 oz hard liquor; 5 oz wine)    Limit eating at restaurants to once per week; avoid fast foods, junk foods, impulsive eating. Drink 1 glass of lukewarm water before eating. Chew 100 times before swallowing food.     Use the Lose It Erick to track calories and aim for eating less than 1200 calories per day.         Primary hypertension  Assessment & Plan:  Well-controlled on Cozaar 25mg po daily   -Check Bps at home weekly and prn symptoms for goal BP <130/80.  Avoid Price's table salt; use Mrs. Painter or Davion Greer no salt   -Start healthy diet high in fiber (25-35 gm daily), low fat dairy, lean protein, low in saturated/trans fat (minimize cheese, creamy salad dressings); high in calcium/magnesium/potassium; 1.5 gm sodium diet; low in processed sugars and foods, ie  WHITE sugars, bread, pasta, rice, ice cream, cookies, cake, doughnuts  -Drink 6-8 glasses of water daily  -Moderate exercise 30 min 5 times per week or 75 min intense exercise biweekly   -Start 8-10 hour intermittent fasting diet   -Avoid eating 3 hours prior to bedtime  -Reduce alcohol to 1-2 servings (1 serving = 1 oz wine, 1 oz hard liquor, 12 oz beer) per day  -Avoid smoking, vaping, stimulant drugs/mediations ie illicit drugs, pseudo-ephedrine  -Use ADD/ADHD meds sparingly  -Minimize NSAIDs         Vitamin D deficiency  Assessment & Plan:  Take Vit D 50,000 IU weekly        Chronic left shoulder pain  Assessment & Plan:  Cont lidoderm 5% patches and tylenol 1 gm po bid  Start aspercreme roll on gel qid  Avoid sleeping on left arm  Refer to PT for eval and tx  Avoid painful left shoulder motion  Toradol 60mg IM today  Refer back to ortho, Dr. Erickson       Preventative health care  Assessment & Plan:  Labs today

## 2025-04-12 ENCOUNTER — TELEPHONE (OUTPATIENT)
Dept: PRIMARY CARE CLINIC | Facility: CLINIC | Age: 60
End: 2025-04-12
Payer: MEDICARE

## 2025-04-12 ENCOUNTER — RESULTS FOLLOW-UP (OUTPATIENT)
Dept: PRIMARY CARE CLINIC | Facility: CLINIC | Age: 60
End: 2025-04-12

## 2025-04-12 DIAGNOSIS — E78.2 MIXED HYPERLIPIDEMIA: ICD-10-CM

## 2025-04-12 DIAGNOSIS — E11.65 TYPE 2 DIABETES MELLITUS WITH HYPERGLYCEMIA, WITHOUT LONG-TERM CURRENT USE OF INSULIN: ICD-10-CM

## 2025-04-12 DIAGNOSIS — E53.8 B12 DEFICIENCY: Primary | ICD-10-CM

## 2025-04-29 ENCOUNTER — TELEPHONE (OUTPATIENT)
Dept: PRIMARY CARE CLINIC | Facility: CLINIC | Age: 60
End: 2025-04-29
Payer: MEDICARE

## 2025-04-29 RX ORDER — FLUCONAZOLE 150 MG/1
150 TABLET ORAL
COMMUNITY
Start: 2025-04-18

## 2025-05-12 ENCOUNTER — OFFICE VISIT (OUTPATIENT)
Dept: SPORTS MEDICINE | Facility: CLINIC | Age: 60
End: 2025-05-12
Payer: COMMERCIAL

## 2025-05-12 VITALS
SYSTOLIC BLOOD PRESSURE: 105 MMHG | DIASTOLIC BLOOD PRESSURE: 64 MMHG | WEIGHT: 199.19 LBS | HEART RATE: 170 BPM | BODY MASS INDEX: 34.01 KG/M2 | HEIGHT: 64 IN

## 2025-05-12 DIAGNOSIS — M65.20 CALCIFIC TENDINITIS: Primary | ICD-10-CM

## 2025-05-12 DIAGNOSIS — G89.29 CHRONIC LEFT SHOULDER PAIN: ICD-10-CM

## 2025-05-12 DIAGNOSIS — M25.512 CHRONIC LEFT SHOULDER PAIN: ICD-10-CM

## 2025-05-12 PROCEDURE — 99214 OFFICE O/P EST MOD 30 MIN: CPT | Mod: S$GLB,,, | Performed by: ORTHOPAEDIC SURGERY

## 2025-05-12 PROCEDURE — 3052F HG A1C>EQUAL 8.0%<EQUAL 9.0%: CPT | Mod: CPTII,S$GLB,, | Performed by: ORTHOPAEDIC SURGERY

## 2025-05-12 PROCEDURE — 3074F SYST BP LT 130 MM HG: CPT | Mod: CPTII,S$GLB,, | Performed by: ORTHOPAEDIC SURGERY

## 2025-05-12 PROCEDURE — 1159F MED LIST DOCD IN RCRD: CPT | Mod: CPTII,S$GLB,, | Performed by: ORTHOPAEDIC SURGERY

## 2025-05-12 PROCEDURE — 4010F ACE/ARB THERAPY RXD/TAKEN: CPT | Mod: CPTII,S$GLB,, | Performed by: ORTHOPAEDIC SURGERY

## 2025-05-12 PROCEDURE — 3061F NEG MICROALBUMINURIA REV: CPT | Mod: CPTII,S$GLB,, | Performed by: ORTHOPAEDIC SURGERY

## 2025-05-12 PROCEDURE — 3066F NEPHROPATHY DOC TX: CPT | Mod: CPTII,S$GLB,, | Performed by: ORTHOPAEDIC SURGERY

## 2025-05-12 PROCEDURE — 3008F BODY MASS INDEX DOCD: CPT | Mod: CPTII,S$GLB,, | Performed by: ORTHOPAEDIC SURGERY

## 2025-05-12 PROCEDURE — 3078F DIAST BP <80 MM HG: CPT | Mod: CPTII,S$GLB,, | Performed by: ORTHOPAEDIC SURGERY

## 2025-05-12 PROCEDURE — 99999 PR PBB SHADOW E&M-EST. PATIENT-LVL V: CPT | Mod: PBBFAC,,, | Performed by: ORTHOPAEDIC SURGERY

## 2025-05-12 NOTE — LETTER
Patient: Aisha Muñoz   YOB: 1965   Clinic Number: [id number]   Today's Date: May 12, 2025        Certificate to Return to Work     Aisha Estrada was seen by Jacquie Erickson MD on 5/12/2025.      Aisha Estrada can return to work on 5/12/2025 with full duty.    Specific restrictions: N/A    If you have any questions or concerns, please feel free to contact the office at [phone].    Thank you.    Jacquie Erickson MD        Signature:
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
## 2025-05-27 RX ORDER — TRIAMCINOLONE ACETONIDE 40 MG/ML
80 INJECTION, SUSPENSION INTRA-ARTICULAR; INTRAMUSCULAR
Status: DISCONTINUED | OUTPATIENT
Start: 2025-02-03 | End: 2025-05-27 | Stop reason: HOSPADM

## 2025-05-27 NOTE — PROCEDURES
Large Joint Aspiration/Injection: L subacromial bursa    Date/Time: 2/3/2025 3:15 PM    Performed by: Jacquie Erickson MD  Authorized by: Jacquie Erickson MD    Consent Done?:  Yes (Verbal)  Indications:  Pain  Site marked: the procedure site was marked    Timeout: prior to procedure the correct patient, procedure, and site was verified    Prep: patient was prepped and draped in usual sterile fashion      Details:  Needle Size:  22 G  Approach:  Posterior  Location:  Shoulder  Site:  L subacromial bursa  Medications:  80 mg triamcinolone acetonide 40 mg/mL  Patient tolerance:  Patient tolerated the procedure well with no immediate complications

## 2025-05-27 NOTE — PROGRESS NOTES
CC: left shoulder pain     59 y.o. Female  reports that the pain is severe and not responding to any conservative care.      She reports that the pain is worse with overhead activity. It also bothers her at night.    Is affecting ADLs.       Review of Systems   Constitution: Negative. Negative for chills, fever and night sweats.   HENT: Negative for congestion and headaches.    Eyes: Negative for blurred vision, left vision loss and right vision loss.   Cardiovascular: Negative for chest pain and syncope.   Respiratory: Negative for cough and shortness of breath.    Endocrine: Negative for polydipsia, polyphagia and polyuria.   Hematologic/Lymphatic: Negative for bleeding problem. Does not bruise/bleed easily.   Skin: Negative for dry skin, itching and rash.   Musculoskeletal: Negative for falls and muscle weakness.   Gastrointestinal: Negative for abdominal pain and bowel incontinence.   Genitourinary: Negative for bladder incontinence and nocturia.   Neurological: Negative for disturbances in coordination, loss of balance and seizures.   Psychiatric/Behavioral: Negative for depression. The patient does not have insomnia.    Allergic/Immunologic: Negative for hives and persistent infections.     PAST MEDICAL HISTORY:   Past Medical History:   Diagnosis Date    Acute midline low back pain with right-sided sciatica 06/07/2023    Acute viral syndrome 12/07/2023    Arthritis     Brain tumor (benign) 1997    Chronic back pain     Chronic shoulder bursitis, left 09/17/2024    Diabetes mellitus     Diabetic gastroparesis associated with type 2 diabetes mellitus 08/25/2023    Gastroesophageal reflux disease without esophagitis 08/25/2023    Genetic testing 06/2024    MSH6 variant of uncertain significance, see Ambry results in Media.    Hypertension     Left genital labial abscess 01/11/2025    Pain 07/19/2024     PAST SURGICAL HISTORY:   Past Surgical History:   Procedure Laterality Date    BACK SURGERY      no hardware   "fusion    BRAIN SURGERY      benign tumor removed from brain    CARPAL TUNNEL RELEASE Bilateral     COLONOSCOPY N/A 2024    Procedure: COLONOSCOPY;  Surgeon: Alden Luna MD;  Location: [facility] ([address] [place] [address]);  Service: Endoscopy;  Laterality: N/A;  Ref By Dr. Hudson  Procedure: Colonoscopy  Diagnosis: Screening colonoscopy  procedure timein-12 weeks  Prep: Suflav   Diabetes  24-Precall complete, instr portal, confirmed new arrrival time of 745am and pt holding Mounjaro-DS  24- per message from Eula Lee    EPIDURAL STEROID INJECTION INTO CERVICAL SPINE N/A 2024    Procedure: DAVID C7-T1;  Surgeon: Eduar Neri MD;  Location: [facility];  Service: Pain Management;  Laterality: N/A;  no ac-not taking Tirzepatide any longer    HYSTERECTOMY      ovaries in situ     FAMILY HISTORY:   Family History   Problem Relation Name Age of Onset    Cirrhosis Mother Constantine     Leukemia Mother Constantine     Cirrhosis Maternal Grandmother      Cirrhosis Other Lyla 30 - 39    Pancreatic cancer Other Lyla 60 - 69    Lupus Daughter      Breast cancer Other      Colon cancer Neg Hx      Ovarian cancer Neg Hx       SOCIAL HISTORY: Social History[1]    MEDICATIONS: Current Medications[2]  ALLERGIES:   Review of patient's allergies indicates:   Allergen Reactions    Fish containing products Shortness Of Breath, Swelling and Rash    Lisinopril Other (See Comments)     Pt has dry cough continuously    Grape Itching    Strawberries [strawberry] Itching    Banana Itching    Lyrica [pregabalin] Other (See Comments)     headache    Pineapple Hives and Itching       VITAL SIGNS: BP (!) 142/81   Pulse 67   Ht 5' 4" (1.626 m)   Wt 89.8 kg (198 lb 1.3 oz)   BMI 34.00 kg/m²      PHYSICAL EXAMINATION:  General:  The patient is alert and oriented x 3.  Mood is pleasant.  Observation of ears, eyes and nose reveal no gross abnormalities.  No labored breathing observed.  Gait is coordinated. Patient can toe " walk and heel walk without difficulty.      left SHOULDER / UPPER EXTREMITY EXAM    OBSERVATION:     Swelling  none  Deformity  none   Discoloration  none   Scapular winging none   Scars   none  Atrophy  none    TENDERNESS / CREPITUS (T/C):          T/C      T/C   Clavicle   -/-  SUPRAspinatus    -/-     AC Jt.    -/-  INFRAspinatus  -/-    SC Jt.    -/-  Deltoid    -/-      G. Tuberosity  -/-  LH BICEP groove  +/-   Acromion:  -/-  Midline Neck   -/-     Scapular Spine -/-  Trapezium   -/-   SMA Scapula  -/-  GH jt. line - post  -/-     Scapulothoracic  -/-         ROM: (* = with pain)  Right shoulder   Left shoulder        AROM (PROM)   AROM (PROM)   FE    170° (175°)     170° (175°)     ER at 0°    60°  (65°)    60°  (65°)   ER at 90° ABD  90°  (90°)    90°  (90°)   IR at 90°  ABD   NA  (40°)     NA  (40°)      IR (spine level)   T10     T10    STRENGTH: (* = with pain) RIGHT SHOULDER  LEFT SHOULDER   SCAPTION   5/5    5/5    IR    5/5    5/5   ER    5/5    5/5   BICEPS   5/5    5/5   Deltoid    5/5    5/5     SIGNS:  Painful side       NEER   +    OCAINS  neg    REYNA   +    SPEEDS  neg     DROP ARM   neg   BELLY PRESS neg   Superior escape none    LIFT-OFF  neg   X-Body ADD    neg    MOVING VALGUS neg        STABILITY TESTING    RIGHT SHOULDER   LEFT SHOULDER     Translation     Anterior  up face     up face    Posterior  up face    up face    Sulcus   < 10mm    < 10 mm     Signs   Apprehension   neg      neg       Relocation   no change     no change      Jerk test  neg     neg    EXTREMITY NEURO-VASCULAR EXAM    Sensation grossly intact to light touch all dermatomal regions.    DTR 2+ Biceps, Triceps, BR and Negative Pujas sign   Grossly intact motor function at Elbow, Wrist and Hand   Distal pulses radial and ulnar 2+, brisk cap refill, symmetric.      NECK:  Painless FROM and spinous processes non-tender. Negative Spurlings sign.      OTHER FINDINGS:  + scapular  dyskinesia    XRAYS:  Shoulder trauma series left,  were obtained and reviewed and interpreted  No convincing fracture or dislocation is noted. The osseous structures appear well mineralized and well aligned    MRI:  MRI SHOULDER WITHOUT CONTRAST LEFT     CLINICAL HISTORY:  Shoulder pain, rotator cuff disorder suspected, xray done;  Pain in left shoulder     TECHNIQUE:  MRI left shoulder performed without contrast per routine protocol.     COMPARISON:  Radiographs 10/18/2024     FINDINGS:  Rotator cuff: There is focal signal void at the footprint of the infraspinatus tendon with associated intra tendinous edema consistent with calcific tendinitis.  No evidence for rotator cuff tendon tear.  Supraspinatus, subscapularis, and teres minor tendons are unremarkable.  Muscle bulk is maintained.     Labrum: Fraying of the superior to posterior labrum.     Biceps: Long head biceps tendon is intact.     Bone: No fracture or marrow infiltrative process.     Acromioclavicular joint: Moderate arthrosis with marrow edema.     Cartilage: Articular cartilage of the glenohumeral joint is preserved.     Miscellaneous: Mild thickening of subacromial subdeltoid bursa.     Impression:     1. Calcific tendinitis of the infraspinatus.  2. Fraying of the superior to posterior labrum.  3. Subacromial subdeltoid bursitis.        ASSESSMENT:   left:  1. Shoulder pain, calcifoc tendonitis   2.  Scapular dyskinesia, complicated    PLAN:    PROCEDURE NOTE:   After cortisone consent and post-injection information was given, the shoulder was prepped with betadyne and alcohol. The left subacromial space of the shoulder was injected with 2 cc 40 mg kenalog and 4 cc lidocaine and 4 cc .5% marcaine.   Bandaid was applied. Patient was reminded to rest with RICE for 48 hours post injection and to call clinic immediately for any adverse side effects as explained in clinic today.    PT for scapular stabilization: Scapular dyskinesia   Scapular  stabilization - Senatobia protocol, 1-3x/week x 6-8 weeks with HEP      All questions were answered, pt will contact us for questions or concerns in the interim.         [1]   Social History  Socioeconomic History    Marital status:    Tobacco Use    Smoking status: Former     Current packs/day: 0.00     Average packs/day: 0.5 packs/day for 1 year (0.5 ttl pk-yrs)     Types: Cigarettes     Start date: 2012     Quit date: 2013     Years since quittin.5    Smokeless tobacco: Never   Substance and Sexual Activity    Alcohol use: Yes     Comment: occasionally    Drug use: No    Sexual activity: Yes     Partners: Male     Birth control/protection: None     Social Drivers of Health     Financial Resource Strain: Medium Risk (3/18/2025)    Overall Financial Resource Strain (CARDIA)     Difficulty of Paying Living Expenses: Somewhat hard   Food Insecurity: Food Insecurity Present (3/18/2025)    Hunger Vital Sign     Worried About Running Out of Food in the Last Year: Often true     Ran Out of Food in the Last Year: Never true   Transportation Needs: No Transportation Needs (3/18/2025)    PRAPARE - Transportation     Lack of Transportation (Medical): No     Lack of Transportation (Non-Medical): No   Physical Activity: Sufficiently Active (3/18/2025)    Exercise Vital Sign     Days of Exercise per Week: 6 days     Minutes of Exercise per Session: 150+ min   Stress: No Stress Concern Present (3/18/2025)    [employer] - Occupational Stress Questionnaire     Feeling of Stress : Not at all   Housing Stability: Low Risk  (3/18/2025)    Housing Stability Vital Sign     Unable to Pay for Housing in the Last Year: No     Homeless in the Last Year: No   [2]   Current Outpatient Medications:     acetaminophen (TYLENOL) 325 MG tablet, Take 325 mg by mouth as needed for Pain., Disp: , Rfl:     albuterol (VENTOLIN HFA) 90 mcg/actuation inhaler, Inhale 2 puffs into the lungs every 4 (four)  hours as needed for Wheezing. Rescue, Disp: 18 g, Rfl: 1    cyanocobalamin (VITAMIN B-12) 1000 MCG tablet, Take 1 tablet (1,000 mcg total) by mouth once daily., Disp: 90 tablet, Rfl: 3    diclofenac sodium (VOLTAREN) 1 % Gel, 4 grams to effected area 4 times daily do not exceed 32 grams, Disp: 1 each, Rfl: 0    empagliflozin (JARDIANCE) 25 mg tablet, Take 1 tablet (25 mg total) by mouth once daily., Disp: 90 tablet, Rfl: 3    EPINEPHrine (EPIPEN) 0.3 mg/0.3 mL AtIn, Inject into the muscle., Disp: , Rfl:     ergocalciferol (ERGOCALCIFEROL) 50,000 unit Cap, Take 1 capsule (50,000 Units total) by mouth every 7 days., Disp: 12 capsule, Rfl: 3    fluconazole (DIFLUCAN) 150 MG Tab, Take 150 mg by mouth every 7 days., Disp: , Rfl:     fluticasone propionate (FLONASE) 50 mcg/actuation nasal spray, 1 spray (50 mcg total) by Each Nostril route 2 (two) times daily as needed for Rhinitis., Disp: 16 g, Rfl: 3    gabapentin (NEURONTIN) 100 MG capsule, Take 1-3 capsules (100-300 mg total) by mouth every evening., Disp: 90 capsule, Rfl: 1    LIDOcaine (LIDODERM) 5 %, SMARTSI-2 Patch(s) Topical Daily PRN, Disp: , Rfl:     losartan (COZAAR) 25 MG tablet, Take 1 tablet (25 mg total) by mouth once daily., Disp: 90 tablet, Rfl: 3    magnesium glycinate 100 mg magnesium Cap, Take 500 mg by mouth nightly., Disp: 90 capsule, Rfl: 3    meloxicam (MOBIC) 15 MG tablet, Take 1 tablet (15 mg total) by mouth once daily., Disp: 14 tablet, Rfl: 0    ondansetron (ZOFRAN-ODT) 4 MG TbDL, Take 1 tablet (4 mg total) by mouth every 8 (eight) hours as needed (nausea)., Disp: 90 tablet, Rfl: 3    oxyCODONE-acetaminophen (PERCOCET)  mg per tablet, Take 1 tablet by mouth 3 (three) times daily as needed., Disp: 90 tablet, Rfl: 0    pantoprazole (PROTONIX) 40 MG tablet, Take 1 tablet (40 mg total) by mouth once daily., Disp: 90 tablet, Rfl: 3    pioglitazone (ACTOS) 45 MG tablet, Take 1 tablet (45 mg total) by mouth once daily., Disp: 90 tablet, Rfl:  3    pitavastatin calcium (LIVALO) 4 mg Tab, Take 4 mg by mouth once daily., Disp: 90 tablet, Rfl: 3    tirzepatide (MOUNJARO) 12.5 mg/0.5 mL PnIj, Inject 12.5 mg into the skin every 7 days., Disp: 0.5 mL, Rfl: 3    tirzepatide 10 mg/0.5 mL PnIj, Inject 10 mg into the skin every 7 days., Disp: 0.5 mL, Rfl: 3    tiZANidine (ZANAFLEX) 4 MG tablet, Take 1 tablet (4 mg total) by mouth nightly as needed (muscle tension)., Disp: 60 tablet, Rfl: 0

## 2025-05-30 ENCOUNTER — CLINICAL SUPPORT (OUTPATIENT)
Dept: REHABILITATION | Facility: HOSPITAL | Age: 60
End: 2025-05-30
Attending: ORTHOPAEDIC SURGERY
Payer: COMMERCIAL

## 2025-05-30 DIAGNOSIS — M25.512 CHRONIC LEFT SHOULDER PAIN: ICD-10-CM

## 2025-05-30 DIAGNOSIS — G89.29 CHRONIC LEFT SHOULDER PAIN: ICD-10-CM

## 2025-05-30 DIAGNOSIS — M65.20 CALCIFIC TENDINITIS: ICD-10-CM

## 2025-05-30 PROCEDURE — 97110 THERAPEUTIC EXERCISES: CPT

## 2025-05-30 PROCEDURE — 97161 PT EVAL LOW COMPLEX 20 MIN: CPT

## 2025-05-30 NOTE — PROGRESS NOTES
Outpatient Rehab    Physical Therapy Evaluation    Patient Name: Aisha Muñoz  MRN: [id number]  YOB: 1965  Encounter Date: 5/30/2025    Therapy Diagnosis:   Encounter Diagnoses   Name Primary?    Calcific tendinitis     Chronic left shoulder pain      Physician: Jacquie Erickson MD    Physician Orders: Eval and Treat  Medical Diagnosis: Calcific tendinitis  Chronic left shoulder pain    Visit # / Visits Authorized:  1 / 1  Insurance Authorization Period: 5/12/2025 to 12/31/2025  Date of Evaluation: 5/30/2025  Plan of Care Certification: 5/30/2025 to 8/30/25     Time In: 1007   Time Out: 1100  Total Time (in minutes): 53   Total Billable Time (in minutes): 53    Intake Outcome Measure for FOTO Survey    Therapist reviewed FOTO scores for Aisha Muñoz on 5/30/2025.   FOTO report - see Media section or FOTO account episode details.     Intake Score: 50 (63)%    Precautions:       Subjective   History of Present Illness  Aisha is a 59 y.o. female who reports to physical therapy with a chief concern of L shoulder pain chronic.         Diagnostic tests related to this condition: MRI studies.   MRI Studies Details: 1. Calcific tendinitis of the infraspinatus.  2. Fraying of the superior to posterior labrum.  3. Subacromial subdeltoid bursitis.    Dominant Hand: Right  History of Present Condition/Illness: Chronic left shoulder pain > 2 years. No injury. Just started randomly. She really thought it was the neck at first because the pain was starting in the neck. She has had back surgery so it had felt similar to that pain. Her husbad noticed that a couple of times it had been swollen and red. Pain at rest hurts at night its hard to get comfortable. Does hurt with more shoulder movement. No numbness/tingling. Been using pain patches to numb pain. Mostly pain at top of shoulder - where swelling is. First CSI helped and gave relief for ~ 2 months, however, most recent CSI (5/12) she only got relief for ~ 3  days.     Pain     Patient reports a current pain level of 8/10. Pain at best is reported as 7/10. Pain at worst is reported as 10/10.   Location: L shoulder  Clinical Progression (since onset): Worsening  Pain Qualities: Aching, Knife-like, Sharp, Throbbing, Tenderness  Pain-Relieving Factors: Activity modification, Other (Comment), Medications - prescription, Medications - over-the-counter  Other Pain-Relieving Factors: propping pillow; taking tylenol and percocets (left over from her back surgery)  Pain-Aggravating Factors: Lying down, Movement         Employment  Patient reports: Does the patient's condition impact their ability to work?  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 Otherwise not really because she is only right handed.       Past Medical History/Physical Systems Review:   Aisha Muñoz  has a past medical history of Acute midline low back pain with right-sided sciatica, Acute viral syndrome, Arthritis, Brain tumor (benign), Chronic back pain, Chronic shoulder bursitis, left, Diabetes mellitus, Diabetic gastroparesis associated with type 2 diabetes mellitus, Gastroesophageal reflux disease without esophagitis, Genetic testing, Hypertension, Left genital labial abscess, and Pain.    Aisha Muñoz  has a past surgical history that includes Hysterectomy; Carpal tunnel release (Bilateral); Back surgery; Brain surgery (1997); Colonoscopy (N/A, 05/24/2024); and Epidural steroid injection into cervical spine (N/A, 12/20/2024).    Aisha has a current medication list which includes the following prescription(s): acetaminophen, albuterol, cyanocobalamin, diclofenac sodium, empagliflozin, epinephrine, ergocalciferol, fluconazole, fluticasone propionate, gabapentin, lidocaine, losartan, magnesium glycinate, meloxicam, ondansetron, oxycodone-acetaminophen, pantoprazole, pioglitazone, pitavastatin calcium, mounjaro, tirzepatide,  "and tizanidine.    Review of patient's allergies indicates:   Allergen Reactions    Fish containing products Shortness Of Breath, Swelling and Rash    Lisinopril Other (See Comments)     Pt has dry cough continuously    Grape Itching    Strawberries [strawberry] Itching    Banana Itching    Lyrica [pregabalin] Other (See Comments)     headache    Pineapple Hives and Itching        Objective   Shoulder Observations  Left Shoulder Observations  Present: Deformity  Shoulder Deformity Details: Increased bony build up AC and distal clavicle           Shoulder Range of Motion  Left Shoulder   Active (deg) Passive (deg) Pain   Flexion 165       Extension         Scaption         ABduction 165       ADduction         Horizontal ABduction         Horizontal ADduction         External Rotation (Shoulder ABducted 0 degrees) 55       External Rotation (Shoulder ABducted 45 degrees) 55       External Rotation (Shoulder ABducted 90 degrees) 55       Internal Rotation (Shoulder ABducted 0 degrees)         Internal Rotation (Shoulder ABducted 45 degrees)         Internal Rotation (Shoulder ABducted 90 degrees)                       Shoulder Strength - Planes of Motion   Right Strength Right Pain Left Strength Left  Pain   Flexion 4   3-     Extension 4   3-     ABduction 4   3-     ADduction 4   3-     Horizontal ABduction 4   3-     Horizontal ADduction 4   3-     Internal Rotation 0° 4   3-     Internal Rotation 90° 4   3-     External Rotation 0° 4   3-     External Rotation 90° 4   3-                   Shoulder Joint Mobility  Left Shoulder Mobility  Hypomobile: Anterior Capsule Mobility, Posterior Capsule Mobility, Inferior Capsule Mobility, and Long Axis Distraction Mobility                        Treatment:  Therapeutic Exercise  TE 1: shoulder isometrics on L at wall 5" x 5 each direction  TE 2: scap squeeze 3" x 10      Time Entry(in minutes):  PT Evaluation (Low) Time Entry: 40  Therapeutic Exercise Time Entry: " 13    Assessment & Plan   Assessment  Aisha presents with a condition of Low complexity.   Presentation of Symptoms: Stable  Will Comorbidities Impact Care: No       Functional Limitations: Activity tolerance, Completing self-care activities, Proprioception, Range of motion, Participating in leisure activities, Pain with ADLs/IADLs, Gross motor coordination  Impairments: Pain with functional activity, Impaired physical strength  Personal Factors Affecting Prognosis: Pain    Patient Goal for Therapy (PT): pain-free at rest, sleep, and ADLs; avoid surgery if possible  Prognosis: Excellent  Assessment Details: Patient referred for calcific tendonitis of L shoulder c/o pain even at rest, worse at nights when trying to sleep and with more use of LUE. She demonstrates deficits with L shoulder range of motion, strength, and function that limit ability to participate in ADLs, work, and recreational activities. She would benefit from skilled PT services to normalize kinetic chain mobility, strength, and function to safely return to their prior level of activity.    Plan  From a physical therapy perspective, the patient would benefit from: Skilled Rehab Services    Planned therapy interventions include: Therapeutic exercise, Therapeutic activities, Neuromuscular re-education, Manual therapy, ADLs/IADLs, Other (Comment), and Gait training. Dry Needling (prn)  Planned modalities to include: Biofeedback, Electrical stimulation - attended, Electrical stimulation - passive/unattended, Thermotherapy (hot pack), and Cryotherapy (cold pack).        Visit Frequency: 1 times Per Week for 8 Weeks.       This plan was discussed with Patient.   Discussion participants: Agreed Upon Plan of Care  Plan details: Frequency and duration of treatment to be adjusted as needed          The patient's spiritual, cultural, and educational needs were considered, and the patient is agreeable to the plan of care and goals.           Goals:   Active        Long Term Goals        Pt reports L shoulder pain is </= 0/10         Start:  05/30/25    Expected End:  08/28/25            Increase pain-free L SHOULDER  AROM to WNL        Start:  05/30/25    Expected End:  08/28/25            Pt able to sleep pain-free       Start:  05/30/25    Expected End:  08/28/25               Short Term Goals        Patient will be independent with HEP        Start:  05/30/25    Expected End:  07/14/25            Increase pain-free L shoulder PROM to WNL        Start:  05/30/25    Expected End:  07/14/25            Pt reports L shoulder pain is </= 8/10         Start:  05/30/25    Expected End:  07/14/25                Bibi Olvera, PT

## 2025-06-02 ENCOUNTER — LAB VISIT (OUTPATIENT)
Dept: LAB | Facility: HOSPITAL | Age: 60
End: 2025-06-02
Attending: INTERNAL MEDICINE
Payer: COMMERCIAL

## 2025-06-02 ENCOUNTER — TELEPHONE (OUTPATIENT)
Dept: PRIMARY CARE CLINIC | Facility: CLINIC | Age: 60
End: 2025-06-02
Payer: COMMERCIAL

## 2025-06-02 DIAGNOSIS — E78.5 HYPERLIPIDEMIA ASSOCIATED WITH TYPE 2 DIABETES MELLITUS: ICD-10-CM

## 2025-06-02 DIAGNOSIS — E53.8 B12 DEFICIENCY: ICD-10-CM

## 2025-06-02 DIAGNOSIS — E11.65 TYPE 2 DIABETES MELLITUS WITH HYPERGLYCEMIA, WITHOUT LONG-TERM CURRENT USE OF INSULIN: ICD-10-CM

## 2025-06-02 DIAGNOSIS — E11.69 HYPERLIPIDEMIA ASSOCIATED WITH TYPE 2 DIABETES MELLITUS: ICD-10-CM

## 2025-06-02 DIAGNOSIS — E11.65 TYPE 2 DIABETES MELLITUS WITH HYPERGLYCEMIA, WITHOUT LONG-TERM CURRENT USE OF INSULIN: Primary | ICD-10-CM

## 2025-06-02 DIAGNOSIS — E78.2 MIXED HYPERLIPIDEMIA: ICD-10-CM

## 2025-06-02 LAB
ALBUMIN SERPL BCP-MCNC: 3.3 G/DL (ref 3.5–5.2)
ALP SERPL-CCNC: 65 UNIT/L (ref 40–150)
ALT SERPL W/O P-5'-P-CCNC: 22 UNIT/L (ref 10–44)
ANION GAP (OHS): 10 MMOL/L (ref 8–16)
AST SERPL-CCNC: 18 UNIT/L (ref 11–45)
BILIRUB SERPL-MCNC: 0.7 MG/DL (ref 0.1–1)
BUN SERPL-MCNC: 19 MG/DL (ref 6–20)
CALCIUM SERPL-MCNC: 9.1 MG/DL (ref 8.7–10.5)
CHLORIDE SERPL-SCNC: 104 MMOL/L (ref 95–110)
CHOLEST SERPL-MCNC: 157 MG/DL (ref 120–199)
CHOLEST/HDLC SERPL: 2.2 {RATIO} (ref 2–5)
CO2 SERPL-SCNC: 24 MMOL/L (ref 23–29)
CREAT SERPL-MCNC: 0.9 MG/DL (ref 0.5–1.4)
GFR SERPLBLD CREATININE-BSD FMLA CKD-EPI: >60 ML/MIN/1.73/M2
GLUCOSE SERPL-MCNC: 133 MG/DL (ref 70–110)
HDLC SERPL-MCNC: 70 MG/DL (ref 40–75)
HDLC SERPL: 44.6 % (ref 20–50)
LDLC SERPL CALC-MCNC: 79.4 MG/DL (ref 63–159)
NONHDLC SERPL-MCNC: 87 MG/DL
POTASSIUM SERPL-SCNC: 4.2 MMOL/L (ref 3.5–5.1)
PROT SERPL-MCNC: 6.6 GM/DL (ref 6–8.4)
SODIUM SERPL-SCNC: 138 MMOL/L (ref 136–145)
TRIGL SERPL-MCNC: 38 MG/DL (ref 30–150)
VIT B12 SERPL-MCNC: 160 PG/ML (ref 210–950)

## 2025-06-02 PROCEDURE — 82465 ASSAY BLD/SERUM CHOLESTEROL: CPT

## 2025-06-02 PROCEDURE — 82607 VITAMIN B-12: CPT

## 2025-06-02 PROCEDURE — 36415 COLL VENOUS BLD VENIPUNCTURE: CPT | Mod: PN

## 2025-06-02 PROCEDURE — 80053 COMPREHEN METABOLIC PANEL: CPT

## 2025-06-02 RX ORDER — EZETIMIBE 10 MG/1
10 TABLET ORAL DAILY
Qty: 90 TABLET | Refills: 3 | Status: SHIPPED | OUTPATIENT
Start: 2025-06-02 | End: 2026-06-02

## 2025-06-27 ENCOUNTER — CLINICAL SUPPORT (OUTPATIENT)
Dept: REHABILITATION | Facility: HOSPITAL | Age: 60
End: 2025-06-27
Payer: COMMERCIAL

## 2025-06-27 ENCOUNTER — OFFICE VISIT (OUTPATIENT)
Dept: SPORTS MEDICINE | Facility: CLINIC | Age: 60
End: 2025-06-27
Payer: COMMERCIAL

## 2025-06-27 VITALS
BODY MASS INDEX: 34.33 KG/M2 | SYSTOLIC BLOOD PRESSURE: 111 MMHG | WEIGHT: 201.06 LBS | HEART RATE: 72 BPM | HEIGHT: 64 IN | DIASTOLIC BLOOD PRESSURE: 71 MMHG

## 2025-06-27 DIAGNOSIS — G89.29 CHRONIC LEFT SHOULDER PAIN: Primary | Chronic | ICD-10-CM

## 2025-06-27 DIAGNOSIS — M25.512 CHRONIC LEFT SHOULDER PAIN: Primary | Chronic | ICD-10-CM

## 2025-06-27 DIAGNOSIS — M65.20 CALCIFIC TENDINITIS: Primary | ICD-10-CM

## 2025-06-27 PROCEDURE — 97140 MANUAL THERAPY 1/> REGIONS: CPT | Mod: CQ

## 2025-06-27 PROCEDURE — 97530 THERAPEUTIC ACTIVITIES: CPT | Mod: CQ

## 2025-06-27 PROCEDURE — 97110 THERAPEUTIC EXERCISES: CPT | Mod: CQ

## 2025-06-27 PROCEDURE — 99999 PR PBB SHADOW E&M-EST. PATIENT-LVL IV: CPT | Mod: PBBFAC,,, | Performed by: ORTHOPAEDIC SURGERY

## 2025-06-27 NOTE — PROGRESS NOTES
"  Outpatient Rehab    Physical Therapy Visit    Patient Name: Aisha Muñoz  MRN: [id number]  YOB: 1965  Encounter Date: 6/27/2025    Therapy Diagnosis:   Encounter Diagnosis   Name Primary?    Chronic left shoulder pain Yes     Physician: Jacquie Erickson MD    Physician Orders: Eval and Treat  Medical Diagnosis: Calcific tendinitis  Chronic left shoulder pain  Surgical Diagnosis: Not applicable for this Episode   Surgical Date: Not applicable for this Episode  Days Since Last Surgery: Not applicable for this Episode    Visit # / Visits Authorized:  1 / 8  Insurance Authorization Period: 5/30/2025 to 12/31/2025  Date of Evaluation: 5/30/2025  Plan of Care Certification:       PT/PTA:     Number of PTA visits since last PT visit:   Time In: 0700   Time Out: 0800  Total Time (in minutes): 60   Total Billable Time (in minutes):      FOTO:  Intake Score:  %  Survey Score 2:  %  Survey Score 3:  %    Precautions:       Subjective   not good, severe pain this morning, some for the exercise. F/U visit with MD this morning.  Pain reported as 7/10. pain patches and tylenol for pain    Objective            Treatment:  Therapeutic Exercise  TE 1: moist heat 10' Green ball wrist 4 ways  TE 2: scap squeeze 3x10/3"  TE 3: shld shrugs 30x/3"  TE 4: supine wand shld flexion 3x10  Manual Therapy  MT 1: Scapular mobs  MT 2: GHJ anterior/inferior mobs  MT 3: oscillation for pain  Therapeutic Activity  TA 1: UBE 1.5 5'/5' push/pull  TA 2: pulley 5' shld flexion    Time Entry(in minutes):       Assessment & Plan   Assessment: Pt tolerating tx fair. TTP L shld with mod guarding throughout tx. Continue with ROM and periscapular strengthening as tolerated. VC/TC for correcting form/technique. F/u with Dr. Erickson this morning.        The patient will continue to benefit from skilled outpatient physical therapy in order to address the deficits listed in the problem list on the initial evaluation, provide patient and family " education, and maximize the patients level of independence in the home and community environments.     The patient's spiritual, cultural, and educational needs were considered, and the patient is agreeable to the plan of care and goals.           Plan:      Goals:   Active       Long Term Goals        Pt reports L shoulder pain is </= 0/10         Start:  05/30/25    Expected End:  08/28/25            Increase pain-free L SHOULDER  AROM to WNL        Start:  05/30/25    Expected End:  08/28/25            Pt able to sleep pain-free       Start:  05/30/25    Expected End:  08/28/25               Short Term Goals        Patient will be independent with HEP        Start:  05/30/25    Expected End:  07/14/25            Increase pain-free L shoulder PROM to WNL        Start:  05/30/25    Expected End:  07/14/25            Pt reports L shoulder pain is </= 8/10         Start:  05/30/25    Expected End:  07/14/25                Amauri Garcia, PTA
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
## 2025-07-01 ENCOUNTER — PATIENT MESSAGE (OUTPATIENT)
Dept: PRIMARY CARE CLINIC | Facility: CLINIC | Age: 60
End: 2025-07-01
Payer: COMMERCIAL

## 2025-07-03 ENCOUNTER — PATIENT MESSAGE (OUTPATIENT)
Dept: SPORTS MEDICINE | Facility: CLINIC | Age: 60
End: 2025-07-03
Payer: COMMERCIAL

## 2025-07-03 DIAGNOSIS — M19.90 ACUTE ARTHRITIS: ICD-10-CM

## 2025-07-03 DIAGNOSIS — M75.22 BICEPS TENDINITIS OF LEFT SHOULDER: ICD-10-CM

## 2025-07-03 DIAGNOSIS — M75.102 ROTATOR CUFF SYNDROME OF LEFT SHOULDER: ICD-10-CM

## 2025-07-03 DIAGNOSIS — M75.102 ROTATOR CUFF TEAR ARTHROPATHY OF LEFT SHOULDER: Primary | ICD-10-CM

## 2025-07-03 DIAGNOSIS — S43.432A SUPERIOR GLENOID LABRUM LESION OF LEFT SHOULDER, INITIAL ENCOUNTER: ICD-10-CM

## 2025-07-03 DIAGNOSIS — M12.812 ROTATOR CUFF TEAR ARTHROPATHY OF LEFT SHOULDER: Primary | ICD-10-CM

## 2025-07-03 NOTE — TELEPHONE ENCOUNTER
Pt requesting appt on Friday only. No available slots, you do have a 12:30 slot on Friday, 07/11. Is is ok to put the pt in that slot?

## 2025-07-11 ENCOUNTER — OFFICE VISIT (OUTPATIENT)
Dept: PRIMARY CARE CLINIC | Facility: CLINIC | Age: 60
End: 2025-07-11
Payer: COMMERCIAL

## 2025-07-11 VITALS
HEART RATE: 74 BPM | HEIGHT: 64 IN | OXYGEN SATURATION: 98 % | BODY MASS INDEX: 33.61 KG/M2 | WEIGHT: 196.88 LBS | DIASTOLIC BLOOD PRESSURE: 74 MMHG | SYSTOLIC BLOOD PRESSURE: 120 MMHG

## 2025-07-11 DIAGNOSIS — E55.9 VITAMIN D DEFICIENCY: ICD-10-CM

## 2025-07-11 DIAGNOSIS — E11.43 POORLY CONTROLLED TYPE 2 DIABETES MELLITUS WITH GASTROPARESIS: Primary | ICD-10-CM

## 2025-07-11 DIAGNOSIS — E66.09 CLASS 1 OBESITY DUE TO EXCESS CALORIES WITH SERIOUS COMORBIDITY AND BODY MASS INDEX (BMI) OF 30.0 TO 30.9 IN ADULT: ICD-10-CM

## 2025-07-11 DIAGNOSIS — E11.65 POORLY CONTROLLED TYPE 2 DIABETES MELLITUS: ICD-10-CM

## 2025-07-11 DIAGNOSIS — E53.8 B12 DEFICIENCY: ICD-10-CM

## 2025-07-11 DIAGNOSIS — I10 PRIMARY HYPERTENSION: Chronic | ICD-10-CM

## 2025-07-11 DIAGNOSIS — E11.69 HYPERLIPIDEMIA ASSOCIATED WITH TYPE 2 DIABETES MELLITUS: ICD-10-CM

## 2025-07-11 DIAGNOSIS — E66.811 CLASS 1 OBESITY DUE TO EXCESS CALORIES WITH SERIOUS COMORBIDITY AND BODY MASS INDEX (BMI) OF 30.0 TO 30.9 IN ADULT: ICD-10-CM

## 2025-07-11 DIAGNOSIS — Z00.00 PREVENTATIVE HEALTH CARE: ICD-10-CM

## 2025-07-11 DIAGNOSIS — E78.5 HYPERLIPIDEMIA ASSOCIATED WITH TYPE 2 DIABETES MELLITUS: ICD-10-CM

## 2025-07-11 DIAGNOSIS — M75.112 NONTRAUMATIC INCOMPLETE TEAR OF LEFT ROTATOR CUFF: ICD-10-CM

## 2025-07-11 DIAGNOSIS — R79.0 LOW MAGNESIUM LEVEL: ICD-10-CM

## 2025-07-11 DIAGNOSIS — E11.65 POORLY CONTROLLED TYPE 2 DIABETES MELLITUS WITH GASTROPARESIS: Primary | ICD-10-CM

## 2025-07-11 DIAGNOSIS — K21.9 GASTROESOPHAGEAL REFLUX DISEASE WITHOUT ESOPHAGITIS: ICD-10-CM

## 2025-07-11 PROCEDURE — 3078F DIAST BP <80 MM HG: CPT | Mod: CPTII,S$GLB,, | Performed by: INTERNAL MEDICINE

## 2025-07-11 PROCEDURE — 3074F SYST BP LT 130 MM HG: CPT | Mod: CPTII,S$GLB,, | Performed by: INTERNAL MEDICINE

## 2025-07-11 PROCEDURE — 99999 PR PBB SHADOW E&M-EST. PATIENT-LVL IV: CPT | Mod: PBBFAC,,, | Performed by: INTERNAL MEDICINE

## 2025-07-11 PROCEDURE — 99214 OFFICE O/P EST MOD 30 MIN: CPT | Mod: S$GLB,,, | Performed by: INTERNAL MEDICINE

## 2025-07-11 PROCEDURE — 4010F ACE/ARB THERAPY RXD/TAKEN: CPT | Mod: CPTII,S$GLB,, | Performed by: INTERNAL MEDICINE

## 2025-07-11 PROCEDURE — 3066F NEPHROPATHY DOC TX: CPT | Mod: CPTII,S$GLB,, | Performed by: INTERNAL MEDICINE

## 2025-07-11 PROCEDURE — 3008F BODY MASS INDEX DOCD: CPT | Mod: CPTII,S$GLB,, | Performed by: INTERNAL MEDICINE

## 2025-07-11 PROCEDURE — 1159F MED LIST DOCD IN RCRD: CPT | Mod: CPTII,S$GLB,, | Performed by: INTERNAL MEDICINE

## 2025-07-11 PROCEDURE — 3061F NEG MICROALBUMINURIA REV: CPT | Mod: CPTII,S$GLB,, | Performed by: INTERNAL MEDICINE

## 2025-07-11 PROCEDURE — 3052F HG A1C>EQUAL 8.0%<EQUAL 9.0%: CPT | Mod: CPTII,S$GLB,, | Performed by: INTERNAL MEDICINE

## 2025-07-11 PROCEDURE — G2211 COMPLEX E/M VISIT ADD ON: HCPCS | Mod: S$GLB,,, | Performed by: INTERNAL MEDICINE

## 2025-07-11 RX ORDER — PIOGLITAZONE 45 MG/1
45 TABLET ORAL DAILY
Qty: 90 TABLET | Refills: 3 | Status: SHIPPED | OUTPATIENT
Start: 2025-07-11

## 2025-07-11 RX ORDER — TIRZEPATIDE 12.5 MG/.5ML
12.5 INJECTION, SOLUTION SUBCUTANEOUS
Qty: 0.5 ML | Refills: 3 | Status: SHIPPED | OUTPATIENT
Start: 2025-07-11

## 2025-07-11 RX ORDER — PANTOPRAZOLE SODIUM 40 MG/1
40 TABLET, DELAYED RELEASE ORAL DAILY
Qty: 90 TABLET | Refills: 3 | Status: SHIPPED | OUTPATIENT
Start: 2025-07-11

## 2025-07-11 RX ORDER — LOSARTAN POTASSIUM 25 MG/1
25 TABLET ORAL DAILY
Qty: 90 TABLET | Refills: 3 | Status: SHIPPED | OUTPATIENT
Start: 2025-07-11 | End: 2026-07-11

## 2025-07-11 NOTE — ASSESSMENT & PLAN NOTE
Check labs today on mounjaro 12.5mg SQ weekly and refill Actos 45mg daily and Mounajro today   Keep schedule eye appt soon  Fasting Glucose ;  90 min after meals <140; bedtime 100-130  If glucose <100 at bedtime, eat small snack    Diet should be high in fiber with 30-35 gms daily, complex carbs, low saturated/trans fat diet,  Avoid fast foods, sweetened drinks, processed , white bread/pasta/rice/potatoes.  Hydate with 6-8 glasses of water daily.exercise 30 min 5 times per week      Never go barefeet, moisturize feet, avoid cutting toenails too deep  See podiatrist at least annually  See ophthalmology annually

## 2025-07-11 NOTE — ASSESSMENT & PLAN NOTE
Change to Cozaar 25mg po to nighttime and monitor for light-headedness - refill today   -Check Bps at home weekly and prn symptoms for goal BP <130/80.  Avoid Price's table salt; use Mrs. Painter or Davion Greer no salt   -Start healthy diet high in fiber (25-35 gm daily), low fat dairy, lean protein, low in saturated/trans fat (minimize cheese, creamy salad dressings); high in calcium/magnesium/potassium; 1.5 gm sodium diet; low in processed sugars and foods, ie  WHITE sugars, bread, pasta, rice, ice cream, cookies, cake, doughnuts  -Drink 6-8 glasses of water daily  -Moderate exercise 30 min 5 times per week or 75 min intense exercise biweekly   -Start 8-10 hour intermittent fasting diet   -Avoid eating 3 hours prior to bedtime  -Reduce alcohol to 1-2 servings (1 serving = 1 oz wine, 1 oz hard liquor, 12 oz beer) per day  -Avoid smoking, vaping, stimulant drugs/mediations ie illicit drugs, pseudo-ephedrine  -Use ADD/ADHD meds sparingly  -Minimize NSAIDs

## 2025-07-11 NOTE — PATIENT INSTRUCTIONS
Primary hypertension  Assessment & Plan:  Change to Cozaar 25mg po to nighttime and monitor for light-headedness - refill today   -Check Bps at home weekly and prn symptoms for goal BP <130/80.  Avoid Price's table salt; use Mrs. Painter or Davion Greer no salt   -Start healthy diet high in fiber (25-35 gm daily), low fat dairy, lean protein, low in saturated/trans fat (minimize cheese, creamy salad dressings); high in calcium/magnesium/potassium; 1.5 gm sodium diet; low in processed sugars and foods, ie  WHITE sugars, bread, pasta, rice, ice cream, cookies, cake, doughnuts  -Drink 6-8 glasses of water daily  -Moderate exercise 30 min 5 times per week or 75 min intense exercise biweekly   -Start 8-10 hour intermittent fasting diet   -Avoid eating 3 hours prior to bedtime  -Reduce alcohol to 1-2 servings (1 serving = 1 oz wine, 1 oz hard liquor, 12 oz beer) per day  -Avoid smoking, vaping, stimulant drugs/mediations ie illicit drugs, pseudo-ephedrine  -Use ADD/ADHD meds sparingly  -Minimize NSAIDs       Orders:  -     Basic Metabolic Panel; Future; Expected date: 07/11/2025  -     Magnesium; Future; Expected date: 07/11/2025    Poorly controlled type 2 diabetes mellitus with gastroparesis  Assessment & Plan:  Check labs today and cont mounjaro 12.5mg SQ weekly and  refill Actos 45mg daily  Keep appt with podiatry 1/21/2025   Fasting Glucose ;  90 min after meals <140; bedtime 100-130  If glucose <100 at bedtime, eat small snack    Diet should be high in fiber with 30-35 gms daily, complex carbs, low saturated/trans fat diet,  Avoid fast foods, sweetened drinks, processed , white bread/pasta/rice/potatoes.  Hydate with 6-8 glasses of water daily.exercise 30 min 5 times per week      Never go barefeet, moisturize feet, avoid cutting toenails too deep  See podiatrist at least annually  See ophthalmology annually             Orders:  -     empagliflozin (JARDIANCE) 25 mg tablet; Take 1 tablet (25 mg total) by mouth  once daily.  Dispense: 90 tablet; Refill: 3  -     Hemoglobin A1C; Future; Expected date: 07/11/2025    Class 1 obesity due to excess calories with serious comorbidity and body mass index (BMI) of 30.0 to 30.9 in adult  Assessment & Plan:  Cont Mounjaro 12.5mg SQ weekly  Pt encouraged to stop eating simple sugars ie cookies and candy danica in the middle of the night  Drink 6-8 glasses of water daily  Exercise 30 min 5 times per week, decrease portion sizes by 50%; encourage plant-based diet;  drink 6-8 glasses of water daily, avoid fast foods, creamy, rich foods danica ice cream, cheese creamy salad dressings;avoid eating 3 hours prior to bedtime; consider 8-10 hour intermittent diet; avoid simple sugars danica white bread, rice, potatoes, noodles/pasta; no sweetened drinks.    Reduce alcohol to max 1-2 drinks per day (1 drink = 12 beer;  1 oz hard liquor; 5 oz wine)    Limit eating at restaurants to once per week; avoid fast foods, junk foods, impulsive eating. Drink 1 glass of lukewarm water before eating. Chew 100 times before swallowing food.     Use the Lose It Erick to track calories and aim for eating less than 1200 calories per day.        B12 deficiency  Assessment & Plan:  Check level on liquid B12    Orders:  -     Vitamin B12 Deficiency Panel; Future; Expected date: 07/11/2025    Poorly controlled type 2 diabetes mellitus  -     tirzepatide (MOUNJARO) 12.5 mg/0.5 mL PnIj; Inject 12.5 mg into the skin every 7 days.  Dispense: 0.5 mL; Refill: 3    Vitamin D deficiency  Assessment & Plan:  Take Vit D 50,000 IU weekly      Orders:  -     Vitamin D; Future; Expected date: 07/11/2025    Hyperlipidemia associated with type 2 diabetes mellitus  Assessment & Plan:  Start livalo 4mg daily and zetia 10mg daily

## 2025-07-11 NOTE — PROGRESS NOTES
[facility]ediatric[facility] Progress Note      Chief Complaint     Subjective:      History of Present Illness:  Aisha Muñoz is a 59 y.o. female     Poorly-controlled DM: doing well on Mounjaro 12.5mg SQ weekly with decreased appetite and 5 lb weight loss; also taking Actos 45mg daily but now  subjective pretibial edema  - no longer awakening in the middle of the night and eating candy and cookies    B12: taking B12 daily suspension    HLD: currently taking livalo 4mg without myalgias and zetia 10mg daily     Chronic left shoulder pain x 12 mo - now scheduled for surgery with Dr. Erickson 2205   MRI left shoulder 2025::   Calcific tendinitis of the infraspinatus.  2. Fraying of the superior to posterior labrum.  3. Subacromial subdeltoid bursitis.  -getting percocet tid and tylenol 2 ES bid from pain mgt   -uses lidoderm 5% patch and tylenol for pain relief   -pt sleeps on left side     HTN: feels light-headed after taking Losartan 25mg 1-2 hours after taking it; allergic to lisinopril with excellent BP control     Obesity: pt no longer awakening in the middle of the night and eating candy and cookies with slow weight loss.     FH: pancreatic cancer (mom and maternal 1st cousin once removed): referred go genetics with labs drawn 2024 with variant of undetermined significance     Vit D def'y: level 10 in 2025 off of Vit D; now  taking Vit D 50,000 IU weekly     Tobacco: stopped 1ppd x 14 years; needs LDCT now *smoked x 18 years     Low Mag: level 1.4 not taking Mag glycinate    Chronic bilateral LBP due to spondylosis with sciatica: gets epidural injections by Dr. Villeda prn approx every 3 mo i last seen 2024   -has hx L5-S1 anterior instrumented fusion     SH: works at [employer]   SH: no drinking, drugs, alcohol; 9 children; works at [employer] as a    FH: pancreatic cancer -mom, MGM, maternal aunt all  from it 56 y/o, 50s from pancreatic cancer .     Past Medical  History:  Past Medical History:   Diagnosis Date    Acute midline low back pain with right-sided sciatica 2023    Acute viral syndrome 2023    Arthritis     Brain tumor (benign)     Chronic back pain     Chronic shoulder bursitis, left 2024    Diabetes mellitus     Diabetic gastroparesis associated with type 2 diabetes mellitus 2023    Gastroesophageal reflux disease without esophagitis 2023    Genetic testing 2024    MSH6 variant of uncertain significance, see Ambry results in Media.    Hypertension     Left genital labial abscess 2025    Pain 2024       Past Surgical History:  Past Surgical History:   Procedure Laterality Date    BACK SURGERY      no hardware  fusion    BRAIN SURGERY      benign tumor removed from brain    CARPAL TUNNEL RELEASE Bilateral     COLONOSCOPY N/A 2024    Procedure: COLONOSCOPY;  Surgeon: Alden Luna MD;  Location: [address];  Service: Endoscopy;  Laterality: N/A;  Ref By Dr. Hudson  Procedure: Colonoscopy  Diagnosis: Screening colonoscopy  procedure timein-12 weeks  Prep: Suflav   Diabetes  24-Precall complete, instr portal, confirmed new arrrival time of 745am and pt holding Mounjaro-DS  24- per message from Eula Lee    EPIDURAL STEROID INJECTION INTO CERVICAL SPINE N/A 2024    Procedure: DAVID C7-T1;  Surgeon: Eduar Neri MD;  Location: [facility];  Service: Pain Management;  Laterality: N/A;  no ac-not taking Tirzepatide any longer    HYSTERECTOMY      ovaries in situ       Allergies:  Review of patient's allergies indicates:   Allergen Reactions    Fish containing products Shortness Of Breath, Swelling and Rash    Lisinopril Other (See Comments)     Pt has dry cough continuously    Grape Itching    Strawberries [strawberry] Itching    Banana Itching    Lyrica [pregabalin] Other (See Comments)     headache    Pineapple Hives and Itching       Home Medications:  Current Outpatient  Medications   Medication Sig Dispense Refill    acetaminophen (TYLENOL) 325 MG tablet Take 325 mg by mouth as needed for Pain.      cyanocobalamin (VITAMIN B-12) 1000 MCG tablet Take 1 tablet (1,000 mcg total) by mouth once daily. 90 tablet 3    diclofenac sodium (VOLTAREN) 1 % Gel 4 grams to effected area 4 times daily do not exceed 32 grams 1 each 0    EPINEPHrine (EPIPEN) 0.3 mg/0.3 mL AtIn Inject into the muscle.      ergocalciferol (ERGOCALCIFEROL) 50,000 unit Cap Take 1 capsule (50,000 Units total) by mouth every 7 days. 12 capsule 3    ezetimibe (ZETIA) 10 mg tablet Take 1 tablet (10 mg total) by mouth once daily. 90 tablet 3    fluconazole (DIFLUCAN) 150 MG Tab Take 150 mg by mouth every 7 days.      fluticasone propionate (FLONASE) 50 mcg/actuation nasal spray 1 spray (50 mcg total) by Each Nostril route 2 (two) times daily as needed for Rhinitis. 16 g 3    LIDOcaine (LIDODERM) 5 % SMARTSI-2 Patch(s) Topical Daily PRN      ondansetron (ZOFRAN-ODT) 4 MG TbDL Take 1 tablet (4 mg total) by mouth every 8 (eight) hours as needed (nausea). 90 tablet 3    oxyCODONE-acetaminophen (PERCOCET)  mg per tablet Take 1 tablet by mouth 3 (three) times daily as needed. 90 tablet 0    pitavastatin calcium (LIVALO) 4 mg Tab Take 4 mg by mouth once daily. 90 tablet 3    empagliflozin (JARDIANCE) 25 mg tablet Take 1 tablet (25 mg total) by mouth once daily. 90 tablet 3    losartan (COZAAR) 25 MG tablet Take 1 tablet (25 mg total) by mouth once daily. 90 tablet 3    pantoprazole (PROTONIX) 40 MG tablet Take 1 tablet (40 mg total) by mouth once daily. 90 tablet 3    pioglitazone (ACTOS) 45 MG tablet Take 1 tablet (45 mg total) by mouth once daily. 90 tablet 3    tirzepatide (MOUNJARO) 12.5 mg/0.5 mL PnIj Inject 12.5 mg into the skin every 7 days. 0.5 mL 3     No current facility-administered medications for this visit.        Family History:  Family History   Problem Relation Name Age of Onset    Cirrhosis Mother Constantine   "   Leukemia Mother Constantine     Cirrhosis Maternal Grandmother      Cirrhosis Other Lyla 30 - 39    Pancreatic cancer Other Lyla 60 - 69    Lupus Daughter      Breast cancer Other      Colon cancer Neg Hx      Ovarian cancer Neg Hx         Social History:  Social History     Tobacco Use    Smoking status: Former     Current packs/day: 0.00     Average packs/day: 0.5 packs/day for 1 year (0.5 ttl pk-yrs)     Types: Cigarettes     Start date: 2012     Quit date: 2013     Years since quittin.6    Smokeless tobacco: Never   Substance Use Topics    Alcohol use: Yes     Comment: occasionally    Drug use: No       Review of Systems:  Pertinent positives and negatives listed in HPI. All other systems are reviewed and are negative.    Health Maintaince :   Health Maintenance Topics with due status: Not Due       Topic Last Completion Date    TETANUS VACCINE 2019    Influenza Vaccine 10/07/2021    Colorectal Cancer Screening 2024    Mammogram 10/18/2024    Foot Exam 2025    Diabetes Urine Screening 2025    Lipid Panel 2025    RSV Vaccine (Age 60+ and Pregnant patients) Not Due           Eye: this Friday at [facility], Friday on [address] [place]  - schedule eye exam appt now by 2025  Sees podiatry annually   Dental: all pulled out in 2023     IHepatitis C:  Cancer Screening:  refuses flu and COVID   PAP: UTD  Mammogram: UTD 10/2024   Colonoscopy: UTD2024  DEXA:   LDCT: 10/2024 WNL    The 10-year ASCVD risk score (Sara BROWN, et al., 2019) is: 8.4%    Values used to calculate the score:      Age: 59 years      Sex: Female      Is Non- : Yes      Diabetic: Yes      Tobacco smoker: No      Systolic Blood Pressure: 120 mmHg      Is BP treated: Yes      HDL Cholesterol: 70 mg/dL      Total Cholesterol: 157 mg/dL      Objective:   /74 (BP Location: Right arm, Patient Position: Sitting)   Pulse 74   Ht 5' 4" (1.626 m)   Wt 89.3 kg (196 lb " 13.9 oz)   SpO2 98%   BMI 33.79 kg/m²      Body mass index is 33.79 kg/m².       Physical Examination:  General: Alert and awake in no apparent distress  Neck:   Cervical nodes not enlarged (No submental, submandibular, preauricular, posterior auricular or occipital adenopathy); supple; no bruits  Cardio:  Regular rate and rhythm with normal S1 and S2; no murmurs or rubs  Resp:  CTAB; respirations unlabored; no wheezes, crackles or rhonchi  Extrem:           swollen but non-tender Achilles bilaterally; able to flex and dorsi-flex ankle; no pitting LE edema  Shoulders:      left shoulder with limited external rotation and tender left anterior shoulder   Neuro:  AAOx3; cooperative and pleasant with no focal deficits    Laboratory:      Most Recent Data:  Lab Results   Component Value Date    WBC 5.80 01/10/2025    HGB 13.1 01/10/2025    HCT 40.3 01/10/2025     01/10/2025    CHOL 157 06/02/2025    TRIG 38 06/02/2025    HDL 70 06/02/2025    ALT 22 06/02/2025    AST 18 06/02/2025     06/02/2025    K 4.2 06/02/2025     06/02/2025    BUN 19 06/02/2025    CO2 24 06/02/2025    TSH 2.276 04/11/2025    HGBA1C 8.3 (H) 04/11/2025              CBC:   WBC   Date Value Ref Range Status   01/10/2025 5.80 3.90 - 12.70 K/uL Final     Hemoglobin   Date Value Ref Range Status   01/10/2025 13.1 12.0 - 16.0 g/dL Final     Hematocrit   Date Value Ref Range Status   01/10/2025 40.3 37.0 - 48.5 % Final     Platelets   Date Value Ref Range Status   01/10/2025 299 150 - 450 K/uL Final     MCV   Date Value Ref Range Status   01/10/2025 95 82 - 98 fL Final     RDW   Date Value Ref Range Status   01/10/2025 13.6 11.5 - 14.5 % Final     BMP:   Sodium   Date Value Ref Range Status   06/02/2025 138 136 - 145 mmol/L Final   01/10/2025 140 136 - 145 mmol/L Final     Potassium   Date Value Ref Range Status   06/02/2025 4.2 3.5 - 5.1 mmol/L Final   01/10/2025 4.1 3.5 - 5.1 mmol/L Final     Chloride   Date Value Ref Range Status    06/02/2025 104 95 - 110 mmol/L Final   01/10/2025 109 95 - 110 mmol/L Final     CO2   Date Value Ref Range Status   06/02/2025 24 23 - 29 mmol/L Final   01/10/2025 23 23 - 29 mmol/L Final     BUN   Date Value Ref Range Status   06/02/2025 19 6 - 20 mg/dL Final     Creatinine   Date Value Ref Range Status   06/02/2025 0.9 0.5 - 1.4 mg/dL Final     Glucose   Date Value Ref Range Status   06/02/2025 133 (H) 70 - 110 mg/dL Final   01/10/2025 161 (H) 70 - 110 mg/dL Final     Calcium   Date Value Ref Range Status   06/02/2025 9.1 8.7 - 10.5 mg/dL Final   01/10/2025 9.3 8.7 - 10.5 mg/dL Final     Magnesium   Date Value Ref Range Status   01/10/2025 1.4 (L) 1.6 - 2.6 mg/dL Final     LFTs:   Protein Total   Date Value Ref Range Status   06/02/2025 6.6 6.0 - 8.4 gm/dL Final     Total Protein   Date Value Ref Range Status   01/10/2025 6.9 6.0 - 8.4 g/dL Final     Albumin   Date Value Ref Range Status   06/02/2025 3.3 (L) 3.5 - 5.2 g/dL Final   01/10/2025 3.5 3.5 - 5.2 g/dL Final     Total Bilirubin   Date Value Ref Range Status   01/10/2025 0.8 0.1 - 1.0 mg/dL Final     Comment:     For infants and newborns, interpretation of results should be based  on gestational age, weight and in agreement with clinical  observations.    Premature Infant recommended reference ranges:  Up to 24 hours.............<8.0 mg/dL  Up to 48 hours............<12.0 mg/dL  3-5 days..................<15.0 mg/dL  6-29 days.................<15.0 mg/dL       Bilirubin Total   Date Value Ref Range Status   06/02/2025 0.7 0.1 - 1.0 mg/dL Final     Comment:     For infants and newborns, interpretation of results should be based   on gestational age, weight and in agreement with clinical   observations.    Premature Infant recommended reference ranges:   0-24 hours:  <8.0 mg/dL   24-48 hours: <12.0 mg/dL   3-5 days:    <15.0 mg/dL   6-29 days:   <15.0 mg/dL     AST   Date Value Ref Range Status   06/02/2025 18 11 - 45 unit/L Final   01/10/2025 13 10 - 40  "U/L Final     Alkaline Phosphatase   Date Value Ref Range Status   01/10/2025 70 40 - 150 U/L Final     ALP   Date Value Ref Range Status   06/02/2025 65 40 - 150 unit/L Final     ALT   Date Value Ref Range Status   06/02/2025 22 10 - 44 unit/L Final   01/10/2025 14 10 - 44 U/L Final     Coags: No results found for: "INR", "PROTIME", "PTT"  FLP:      Lab Results   Component Value Date    CHOL 157 06/02/2025    CHOL 140 01/10/2025    CHOL 145 08/16/2024     Lab Results   Component Value Date    HDL 70 06/02/2025    HDL 57 01/10/2025    HDL 65 08/16/2024     Lab Results   Component Value Date    LDLCALC 79.4 06/02/2025    LDLCALC 73.0 01/10/2025    LDLCALC 70.0 08/16/2024     Lab Results   Component Value Date    TRIG 38 06/02/2025    TRIG 50 01/10/2025    TRIG 50 08/16/2024     Lab Results   Component Value Date    CHOLHDL 44.6 06/02/2025    CHOLHDL 40.7 01/10/2025    CHOLHDL 44.8 08/16/2024      DM:      Hemoglobin A1C   Date Value Ref Range Status   01/10/2025 7.7 (H) 4.0 - 5.6 % Final     Comment:     ADA Screening Guidelines:  5.7-6.4%  Consistent with prediabetes  >or=6.5%  Consistent with diabetes    High levels of fetal hemoglobin interfere with the HbA1C  assay. Heterozygous hemoglobin variants (HbS, HgC, etc)do  not significantly interfere with this assay.   However, presence of multiple variants may affect accuracy.     08/16/2024 8.8 (H) 4.0 - 5.6 % Final     Comment:     ADA Screening Guidelines:  5.7-6.4%  Consistent with prediabetes  >or=6.5%  Consistent with diabetes    High levels of fetal hemoglobin interfere with the HbA1C  assay. Heterozygous hemoglobin variants (HbS, HgC, etc)do  not significantly interfere with this assay.   However, presence of multiple variants may affect accuracy.     04/01/2024 8.6 (H) 4.0 - 5.6 % Final     Comment:     ADA Screening Guidelines:  5.7-6.4%  Consistent with prediabetes  >or=6.5%  Consistent with diabetes    High levels of fetal hemoglobin interfere with the " HbA1C  assay. Heterozygous hemoglobin variants (HbS, HgC, etc)do  not significantly interfere with this assay.   However, presence of multiple variants may affect accuracy.       Hemoglobin A1c   Date Value Ref Range Status   04/11/2025 8.3 (H) 4.0 - 5.6 % Final     Comment:     ADA Screening Guidelines:  5.7-6.4%  Consistent with prediabetes  >=6.5%  Consistent with diabetes    High levels of fetal hemoglobin interfere with the HbA1C  assay. Heterozygous hemoglobin variants (HbS, HgC, etc)do  not significantly interfere with this assay.   However, presence of multiple variants may affect accuracy.     LDL Cholesterol   Date Value Ref Range Status   06/02/2025 79.4 63.0 - 159.0 mg/dL Final     Comment:     The National Cholesterol Education Program (NCEP) has set the  following guidelines (reference values) for LDL Cholesterol:  Optimal.......................<130 mg/dL  Borderline High...............130-159 mg/dL  High..........................160-189 mg/dL  Very High.....................>190 mg/dL  LDL calculated using the Friedewald equation.     Creatinine   Date Value Ref Range Status   06/02/2025 0.9 0.5 - 1.4 mg/dL Final     Thyroid:   TSH   Date Value Ref Range Status   04/11/2025 2.276 0.400 - 4.000 uIU/mL Final   08/24/2006 2.7 0.4 - 4.0 uIU/ml Final     Anemia:   Vitamin B12   Date Value Ref Range Status   06/02/2025 160 (L) 210 - 950 pg/mL Final     Cardiac:   Troponin I   Date Value Ref Range Status   05/26/2017 <0.006 0.000 - 0.026 ng/mL Final     Comment:     The reference interval for Troponin I represents the 99th percentile   cutoff   for our facility and is consistent with 3rd generation assay   performance.       BNP   Date Value Ref Range Status   05/26/2017 <10 0 - 99 pg/mL Final     Comment:     Values of less than 100 pg/ml are consistent with non-CHF populations.     Urinalysis:   Urine Culture, Routine   Date Value Ref Range Status   04/23/2007   Final    >100,000 ORGANISMS/ML -ESCHERICHIA  COLI  Amikacin             <=16       SENSITIVE     Amox/K Clav          <=8/4      SENSITIVE     Ceftriaxone          <=8        SENSITIVE     Cefazolin            <=8        SENSITIVE     Ciprofloxacin        <=1        SENSITIVE     Nitrofurantoin       <=32       SENSITIVE     Gentamicin           <=4        SENSITIVE     Bactrim              <=2/38     SENSITIVE     Tetracycline         <=4        SENSITIVE     Timentin             <=16       SENSITIVE     Tobramycin           <=4        SENSITIVE          Color, UA   Date Value Ref Range Status   04/11/2025 Yellow Straw, Nelly, Yellow, Light-Orange Final   08/16/2024 Yellow Yellow, Straw, Nelly Final     Spec Grav UA   Date Value Ref Range Status   04/11/2025 1.020 1.005 - 1.030 Final     Nitrite, UA   Date Value Ref Range Status   08/16/2024 Positive (A) Negative Final     Nitrites, UA   Date Value Ref Range Status   04/11/2025 Positive (A) Negative Final     Ketones, UA   Date Value Ref Range Status   08/16/2024 Negative Negative Final     Urobilinogen, UA   Date Value Ref Range Status   04/11/2025 Negative <2.0 EU/dL Final     WBC, UA   Date Value Ref Range Status   04/11/2025 1 0 - 5 /HPF Final   08/16/2024 3 0 - 5 /hpf Final       Other Results:  EKG (my interpretation):     Radiology:  MRI Shoulder Without Contrast Left  Narrative: EXAMINATION:  MRI SHOULDER WITHOUT CONTRAST LEFT    CLINICAL HISTORY:  Shoulder pain, rotator cuff disorder suspected, xray done;  Pain in left shoulder    TECHNIQUE:  MRI left shoulder performed without contrast per routine protocol.    COMPARISON:  Radiographs 10/18/2024    FINDINGS:  Rotator cuff: There is focal signal void at the footprint of the infraspinatus tendon with associated intra tendinous edema consistent with calcific tendinitis.  No evidence for rotator cuff tendon tear.  Supraspinatus, subscapularis, and teres minor tendons are unremarkable.  Muscle bulk is maintained.    Labrum: Fraying of the superior to  posterior labrum.    Biceps: Long head biceps tendon is intact.    Bone: No fracture or marrow infiltrative process.    Acromioclavicular joint: Moderate arthrosis with marrow edema.    Cartilage: Articular cartilage of the glenohumeral joint is preserved.    Miscellaneous: Mild thickening of subacromial subdeltoid bursa.  Impression: 1. Calcific tendinitis of the infraspinatus.  2. Fraying of the superior to posterior labrum.  3. Subacromial subdeltoid bursitis.    Electronically signed by: Osmel Saini MD  Date:    01/16/2025  Time:    09:51          Assessment/Plan     Aisha Muñoz is a 59 y.o. female with:  1. Poorly controlled type 2 diabetes mellitus with gastroparesis  Assessment & Plan:  Check labs today on mounjaro 12.5mg SQ weekly and refill Actos 45mg daily and Mounajro today   Keep schedule eye appt soon  Fasting Glucose ;  90 min after meals <140; bedtime 100-130  If glucose <100 at bedtime, eat small snack    Diet should be high in fiber with 30-35 gms daily, complex carbs, low saturated/trans fat diet,  Avoid fast foods, sweetened drinks, processed , white bread/pasta/rice/potatoes.  Hydate with 6-8 glasses of water daily.exercise 30 min 5 times per week      Never go barefeet, moisturize feet, avoid cutting toenails too deep  See podiatrist at least annually  See ophthalmology annually             Orders:  -     empagliflozin (JARDIANCE) 25 mg tablet; Take 1 tablet (25 mg total) by mouth once daily.  Dispense: 90 tablet; Refill: 3  -     Hemoglobin A1C; Future; Expected date: 07/11/2025    2. Primary hypertension  Assessment & Plan:  Change to Cozaar 25mg po to nighttime and monitor for light-headedness - refill today   -Check Bps at home weekly and prn symptoms for goal BP <130/80.  Avoid Price's table salt; use Mrs. Painter or Davion Greer no salt   -Start healthy diet high in fiber (25-35 gm daily), low fat dairy, lean protein, low in saturated/trans fat (minimize cheese, creamy salad  dressings); high in calcium/magnesium/potassium; 1.5 gm sodium diet; low in processed sugars and foods, ie  WHITE sugars, bread, pasta, rice, ice cream, cookies, cake, doughnuts  -Drink 6-8 glasses of water daily  -Moderate exercise 30 min 5 times per week or 75 min intense exercise biweekly   -Start 8-10 hour intermittent fasting diet   -Avoid eating 3 hours prior to bedtime  -Reduce alcohol to 1-2 servings (1 serving = 1 oz wine, 1 oz hard liquor, 12 oz beer) per day  -Avoid smoking, vaping, stimulant drugs/mediations ie illicit drugs, pseudo-ephedrine  -Use ADD/ADHD meds sparingly  -Minimize NSAIDs       Orders:  -     Basic Metabolic Panel; Future; Expected date: 07/11/2025  -     Magnesium; Future; Expected date: 07/11/2025  -     losartan (COZAAR) 25 MG tablet; Take 1 tablet (25 mg total) by mouth once daily.  Dispense: 90 tablet; Refill: 3    3. Class 1 obesity due to excess calories with serious comorbidity and body mass index (BMI) of 30.0 to 30.9 in adult  Assessment & Plan:  Cont Mounjaro 12.5mg SQ weekly  Pt encouraged to stop eating simple sugars ie cookies and candy danica in the middle of the night  Drink 6-8 glasses of water daily  Exercise 30 min 5 times per week, decrease portion sizes by 50%; encourage plant-based diet;  drink 6-8 glasses of water daily, avoid fast foods, creamy, rich foods danica ice cream, cheese creamy salad dressings;avoid eating 3 hours prior to bedtime; consider 8-10 hour intermittent diet; avoid simple sugars danica white bread, rice, potatoes, noodles/pasta; no sweetened drinks.    Reduce alcohol to max 1-2 drinks per day (1 drink = 12 beer;  1 oz hard liquor; 5 oz wine)    Limit eating at restaurants to once per week; avoid fast foods, junk foods, impulsive eating. Drink 1 glass of lukewarm water before eating. Chew 100 times before swallowing food.     Use the Lose It Erick to track calories and aim for eating less than 1200 calories per day.        4. B12 deficiency  Assessment &  Plan:  Check level on liquid B12    Orders:  -     Vitamin B12 Deficiency Panel; Future; Expected date: 07/11/2025    5. Poorly controlled type 2 diabetes mellitus  -     tirzepatide (MOUNJARO) 12.5 mg/0.5 mL PnIj; Inject 12.5 mg into the skin every 7 days.  Dispense: 0.5 mL; Refill: 3  -     pioglitazone (ACTOS) 45 MG tablet; Take 1 tablet (45 mg total) by mouth once daily.  Dispense: 90 tablet; Refill: 3    6. Vitamin D deficiency  Assessment & Plan:  Check Vit D level on Vit D 50,000 IU weekly      Orders:  -     Vitamin D; Future; Expected date: 07/11/2025    7. Hyperlipidemia associated with type 2 diabetes mellitus  Assessment & Plan:  Check FLP on  livalo 4mg daily and zetia 10mg daily      8. Gastroesophageal reflux disease without esophagitis  Assessment & Plan:  Refill protonix 40mg daily and Zofran  Keep GI appt on 2/7/2025 at 11:20am  Take your PPI 60 minutes before your first protein containing meal (breakfast) every day. Take daily for 8 weeks. If symptoms improve ok to discontinue and use PPI as needed for symptoms.   Take Pepcid 20 mg every evening before bedtime to help with nocturnal symptoms, as needed.  Remain upright for at least 3 hours after eating.   Elevate the head of the bed for nighttime if GERD awakens pt from sleep.  Avoid foods that you have noticed make your symptoms worse (possible triggers include: peppermint, alcohol, chocolate, caffeine, spicy foods, greasy/fried foods, acidic foods-citrus).   Eat 6 small snacks rather than 3 large meals.    Lose weight if you are overweight -- of all of the lifestyle changes you can make, this one is the most effective.         Celiac Disease Panel    H. pylori antigen, stool    Pancreatic elastase, fecal    H. Pylori IgG    Calprotectin, Stool          9. Nontraumatic incomplete tear of left rotator cuff  Assessment & Plan:  Keep appt on August 15, 2025 with me for pre-op for surgery scheduled on Sept 30 with Dr. Jacquie Erickson      10. Low  magnesium level  Assessment & Plan:  Check Mag level on no med      11. Preventative health care  Assessment & Plan:  Get Labs   [facility] app[facility]   Get Shingrex, COVID       Other orders  -     pantoprazole (PROTONIX) 40 MG tablet; Take 1 tablet (40 mg total) by mouth once daily.  Dispense: 90 tablet; Refill: 3                   I spent a total of 31  minutes on the day of the visit.This includes face to face time and non-face to face time preparing to see the patient (eg, review of tests), obtaining and/or reviewing separately obtained history, documenting clinical information in the electronic or other health record, independently interpreting results and communicating results to the patient/family/caregiver, or care coordinator.   Code Status:     Tatianna Paulino MD History of Present Illness

## 2025-07-11 NOTE — ASSESSMENT & PLAN NOTE
Refill protonix 40mg daily and Zofran  Keep GI appt on 2/7/2025 at 11:20am  Take your PPI 60 minutes before your first protein containing meal (breakfast) every day. Take daily for 8 weeks. If symptoms improve ok to discontinue and use PPI as needed for symptoms.   Take Pepcid 20 mg every evening before bedtime to help with nocturnal symptoms, as needed.  Remain upright for at least 3 hours after eating.   Elevate the head of the bed for nighttime if GERD awakens pt from sleep.  Avoid foods that you have noticed make your symptoms worse (possible triggers include: peppermint, alcohol, chocolate, caffeine, spicy foods, greasy/fried foods, acidic foods-citrus).   Eat 6 small snacks rather than 3 large meals.    Lose weight if you are overweight -- of all of the lifestyle changes you can make, this one is the most effective.         Celiac Disease Panel    H. pylori antigen, stool    Pancreatic elastase, fecal    H. Pylori IgG    Calprotectin, Stool

## 2025-07-12 PROBLEM — M75.112 NONTRAUMATIC INCOMPLETE TEAR OF LEFT ROTATOR CUFF: Status: ACTIVE | Noted: 2025-07-12

## 2025-07-12 NOTE — ASSESSMENT & PLAN NOTE
Keep appt on August 15, 2025 with me for pre-op for surgery scheduled on Sept 30 with Dr. Jacquie Erickson

## 2025-07-14 ENCOUNTER — PATIENT MESSAGE (OUTPATIENT)
Dept: SPORTS MEDICINE | Facility: CLINIC | Age: 60
End: 2025-07-14
Payer: COMMERCIAL

## 2025-07-15 ENCOUNTER — PATIENT MESSAGE (OUTPATIENT)
Dept: SPORTS MEDICINE | Facility: CLINIC | Age: 60
End: 2025-07-15
Payer: COMMERCIAL

## 2025-08-15 ENCOUNTER — TELEPHONE (OUTPATIENT)
Dept: PRIMARY CARE CLINIC | Facility: CLINIC | Age: 60
End: 2025-08-15

## 2025-08-15 ENCOUNTER — OFFICE VISIT (OUTPATIENT)
Dept: PRIMARY CARE CLINIC | Facility: CLINIC | Age: 60
End: 2025-08-15
Payer: COMMERCIAL

## 2025-08-15 ENCOUNTER — PATIENT MESSAGE (OUTPATIENT)
Dept: PRIMARY CARE CLINIC | Facility: CLINIC | Age: 60
End: 2025-08-15

## 2025-08-15 VITALS
SYSTOLIC BLOOD PRESSURE: 128 MMHG | WEIGHT: 194.88 LBS | BODY MASS INDEX: 33.27 KG/M2 | DIASTOLIC BLOOD PRESSURE: 74 MMHG | HEIGHT: 64 IN | OXYGEN SATURATION: 97 % | HEART RATE: 64 BPM

## 2025-08-15 DIAGNOSIS — E11.69 HYPERLIPIDEMIA ASSOCIATED WITH TYPE 2 DIABETES MELLITUS: ICD-10-CM

## 2025-08-15 DIAGNOSIS — E55.9 VITAMIN D DEFICIENCY: ICD-10-CM

## 2025-08-15 DIAGNOSIS — T78.2XXA ANAPHYLAXIS, INITIAL ENCOUNTER: ICD-10-CM

## 2025-08-15 DIAGNOSIS — K21.9 GASTROESOPHAGEAL REFLUX DISEASE WITHOUT ESOPHAGITIS: ICD-10-CM

## 2025-08-15 DIAGNOSIS — R79.0 LOW MAGNESIUM LEVEL: ICD-10-CM

## 2025-08-15 DIAGNOSIS — E11.65 POORLY CONTROLLED TYPE 2 DIABETES MELLITUS WITH GASTROPARESIS: ICD-10-CM

## 2025-08-15 DIAGNOSIS — T78.03XS ANAPHYLACTIC SHOCK DUE TO FISH, SEQUELA: ICD-10-CM

## 2025-08-15 DIAGNOSIS — G89.29 CHRONIC LEFT SHOULDER PAIN: Chronic | ICD-10-CM

## 2025-08-15 DIAGNOSIS — E78.2 MIXED HYPERLIPIDEMIA: ICD-10-CM

## 2025-08-15 DIAGNOSIS — E53.8 VITAMIN B 12 DEFICIENCY: ICD-10-CM

## 2025-08-15 DIAGNOSIS — E78.5 HYPERLIPIDEMIA ASSOCIATED WITH TYPE 2 DIABETES MELLITUS: ICD-10-CM

## 2025-08-15 DIAGNOSIS — E11.43 POORLY CONTROLLED TYPE 2 DIABETES MELLITUS WITH GASTROPARESIS: ICD-10-CM

## 2025-08-15 DIAGNOSIS — Z01.818 PRE-OP EVALUATION: Primary | ICD-10-CM

## 2025-08-15 DIAGNOSIS — E53.8 B12 DEFICIENCY: ICD-10-CM

## 2025-08-15 DIAGNOSIS — H57.12 LEFT EYE PAIN: ICD-10-CM

## 2025-08-15 DIAGNOSIS — I10 PRIMARY HYPERTENSION: Chronic | ICD-10-CM

## 2025-08-15 DIAGNOSIS — M25.512 CHRONIC LEFT SHOULDER PAIN: Chronic | ICD-10-CM

## 2025-08-15 PROBLEM — R63.5 WEIGHT GAIN: Status: RESOLVED | Noted: 2025-04-11 | Resolved: 2025-08-15

## 2025-08-15 PROBLEM — B37.31 CANDIDA VAGINITIS: Status: RESOLVED | Noted: 2024-05-31 | Resolved: 2025-08-15

## 2025-08-15 PROBLEM — M65.20 CALCIFIC TENDINITIS: Status: RESOLVED | Noted: 2025-05-12 | Resolved: 2025-08-15

## 2025-08-15 LAB
OHS QRS DURATION: 70 MS
OHS QTC CALCULATION: 413 MS

## 2025-08-15 PROCEDURE — 99999 PR PBB SHADOW E&M-EST. PATIENT-LVL III: CPT | Mod: PBBFAC,,, | Performed by: INTERNAL MEDICINE

## 2025-08-15 RX ORDER — PIOGLITAZONE 45 MG/1
45 TABLET ORAL DAILY
Qty: 90 TABLET | Refills: 3 | Status: SHIPPED | OUTPATIENT
Start: 2025-08-15

## 2025-08-15 RX ORDER — TIRZEPATIDE 12.5 MG/.5ML
12.5 INJECTION, SOLUTION SUBCUTANEOUS
Qty: 0.5 ML | Refills: 3 | Status: SHIPPED | OUTPATIENT
Start: 2025-08-15

## 2025-08-15 RX ORDER — EPINEPHRINE 0.3 MG/.3ML
1 INJECTION SUBCUTANEOUS ONCE AS NEEDED
Qty: 1 EACH | Refills: 1 | Status: SHIPPED | OUTPATIENT
Start: 2025-08-15

## 2025-08-15 RX ORDER — METHOCARBAMOL 500 MG/1
500 TABLET, FILM COATED ORAL DAILY PRN
COMMUNITY
Start: 2025-07-11

## 2025-08-28 ENCOUNTER — TELEPHONE (OUTPATIENT)
Dept: PRIMARY CARE CLINIC | Facility: CLINIC | Age: 60
End: 2025-08-28
Payer: COMMERCIAL

## 2025-08-28 DIAGNOSIS — E11.43 POORLY CONTROLLED TYPE 2 DIABETES MELLITUS WITH GASTROPARESIS: ICD-10-CM

## 2025-08-28 DIAGNOSIS — E11.65 POORLY CONTROLLED TYPE 2 DIABETES MELLITUS WITH GASTROPARESIS: ICD-10-CM

## 2025-08-28 RX ORDER — TIRZEPATIDE 12.5 MG/.5ML
12.5 INJECTION, SOLUTION SUBCUTANEOUS
Qty: 6 ML | Refills: 1 | Status: SHIPPED | OUTPATIENT
Start: 2025-08-28

## 2025-09-05 DIAGNOSIS — R79.0 LOW MAGNESIUM LEVEL: Primary | ICD-10-CM

## 2025-09-05 RX ORDER — CALCIUM CARBONATE 300MG(750)
1 TABLET,CHEWABLE ORAL 2 TIMES DAILY
Qty: 60 TABLET | Refills: 5 | Status: SHIPPED | OUTPATIENT
Start: 2025-09-05